# Patient Record
Sex: MALE | Race: WHITE | NOT HISPANIC OR LATINO | Employment: OTHER | URBAN - METROPOLITAN AREA
[De-identification: names, ages, dates, MRNs, and addresses within clinical notes are randomized per-mention and may not be internally consistent; named-entity substitution may affect disease eponyms.]

---

## 2020-01-24 DIAGNOSIS — M16.11 PRIMARY OSTEOARTHRITIS OF RIGHT HIP: Primary | ICD-10-CM

## 2020-01-27 ENCOUNTER — CLINICAL SUPPORT (OUTPATIENT)
Dept: REHABILITATION | Facility: HOSPITAL | Age: 75
End: 2020-01-27
Payer: MEDICARE

## 2020-01-27 DIAGNOSIS — Z96.641 STATUS POST TOTAL HIP REPLACEMENT, RIGHT: Primary | ICD-10-CM

## 2020-01-27 PROCEDURE — 97161 PT EVAL LOW COMPLEX 20 MIN: CPT | Mod: PN

## 2020-01-27 NOTE — PLAN OF CARE
[facility]  Physical Therapy Initial Evaluation    Name: Ana Cristina Velasco  Clinic Number: [id number]    Therapy Diagnosis:   Encounter Diagnosis   Name Primary?    Status post total hip replacement, right Yes     Physician: Luther Fischer MD    Physician Orders: PT Eval and Treat   Medical Diagnosis: Right hip OA - s/p Right VLADIMIR   Evaluation Date: 1/27/2020  Authorization period Expiration: 12/31/2020  Plan of Care Certification Period: 4/30/2020     Visit #: 1/ Visits authorized: 18  Time In:   8:00 AM   Time Out: 9:00 aM   Total Billable Time: 60 minutes    Precautions: Standard    Subjective   Date of onset: chronic   Date of Surgery: January 10, 2020    PmHx: Rod reports that he has no pertinent past medical history   Ana Cristina Velasco  has no past surgical history on file.    Ana Cristina currently has no medications in their medication list.    Review of patient's allergies indicates:  Allergies not on file       Prior Therapy: Rod had physical therapy for his hip about 1 year ago - went for PT for about 2-3 months for current condition  Social History: Patient lives in a 2-story home with 3 steps to enter ; Master bedroom is downstairs, no need to go upstairs at this time. He lives with his spouse, has walk-in shower - using [facility] frame as a shower chair; oRd has a RW, quad cane, single point cane   Occupation: retired   Prior Level of Function: Independent, golfs on a regular basis; stated that he does no regular exercise   Current Level of Function: currently ambulatory with use of RW, Independent with ADL's with exception of getting MIKE hose on - needs some assistance with this; He is not driving yet; continue to take two percocet's per day - one in the AM and another one toward bedtime.     Pain:  Current 2/10, worst 5/10, best 1/10   Location: hip  right  Description: Aching  Aggravating Factors: Standing, Morning and Getting out of bed/chair  Easing Factors: pain medication and  "rest      Onset/JOLYNN: gradual    History of current condition - ROD reports: well over a year history of symptoms, including progressive increase in right hip pain with walking and recreational activities; Tried therapy for several months, was helpful, but did not affect his pain enough for him to continue with his daily activity.  Rod underwent a posterior-lateral Right VLADIMIR 3 weeks ago.  His incision is currently steri-stripped; no sign of infection noted, incision is dry, healing, periwound tissue remains tender and edematous.  Rod is anxious to get back to golfing, wants to do whatever he needs to in order to achieve that goal. oRd was able to articulate 3/3 total hip precautions.     Pts goals: To return to my PLOF without pain        Objective     Observation: Rod ambulated into clinic with use of RW; brought in quad cane; Rod is alert/oriented x 4; He is well developed, well-nourished; demonstrates good muscle tone; good insights into current deficits as well as voices appropriate goals.     Posture: slight forward head posture; stands/walks with slight trunk flexion;     Hip Range of Motion:   Left active Left Passive Right active  Right Passive   Flexion 102 110 85 95   Abduction 25 25 15 15   Extension 15 15 10 10   Ext. Rotation 25 25 15 15   Int. Rotation 20 20 NT NT         Lower Extremity Strength  Right LE  Left LE    Knee extension: 4+/5 Knee extension: 5/5   Knee flexion: 4+/5 Knee flexion: 5/5   Hip flexion: 4/5 Hip flexion: 5/5   Hip Internal Rotation:  3-/5    Hip Internal Rotation: 5/5      Hip External Rotation: 4+/5    Hip External Rotation: 5/5      Hip extension:  4/5 Hip extension: 5/5   Hip abduction: 4/5 Hip abduction: 4/5   Hip adduction: 4-/5 Hip adduction 4+/5   Ankle dorsiflexion: 5/5 Ankle dorsiflexion: 5/5   Ankle plantarflexion: 4-/5 Ankle plantarflexion: 4-/5     SLS: Firm 10 sec,  Foam 5 sec  Toe Tap test: 5 reps on 6" step with unaffecting leg in 15 seconds  30 sec " mat rise: 7 reps with arms crossed    Palpation: minimal tenderness to palpation at right lateral hip around incision     Sensation: intact to light touch t/o RLE     Flexibility:     90/90 SLR = R minimal restriction, L minimal restriction               Raji test: R moderate restriction, L minimal restriction    PT Evaluation Completed? Yes  Discussed Plan of Care with patient: Yes    TREATMENT     BIRD participated in neuromuscular re-education activities to improve: Posture for 10 minutes. The following activities were included:  Standing right hip flexor stretch in doorway - instructed in this to facilitate improved elongation of right hip flexor   Standing - neck retraction/scapuular retractions     Home Exercises and Patient Education Provided    Education provided re:   - progress towards goals   - role of therapy in multi - disciplinary team, goals for therapy  Pt educated on condition, POC, and expectations in therapy.  No spiritual or educational barriers to learning provided    Home exercises:  Pt will be provided HEP during course of treatment with progressions as appropriate. Pt was advised to perform these exercises free of pain, and to stop performing them if pain occurs.   BIRD demonstrated good  understanding of the education provided.       Functional Limitations Reports - G Codes  Category: Mobility, Body position, Carrying, Self care, Other  Tool: LEFS  Score: 51% limitation     Assessment   Ana Cristina is a 74 y.o. male referred to outpatient physical therapy and presents to PT with s/p Right VLADIMIR . Patient demonstrates limitations as described in the problem list. Pt will benefit from physcial therapy services in order to maximize pain free and/or functional use of bilateral LE's . The following goals were discussed with the patient and patient is in agreement with them as to be addressed in the treatment plan.   Pt prognosis is Excellent.   Pt will benefit from skilled outpatient Physical Therapy  to address the deficits stated above and in the chart below, provide pt/family education, and to maximize pt's level of independence.     Plan of care discussed with patient: Yes  Pt's spiritual, cultural and educational needs considered and pt agreeable to plan of care and goals as stated below:     Anticipated Barriers for therapy: none    Medical necessity is demonstrated by the following IMPAIRMENTS/PROBLEM LIST:    weakness, impaired endurance, impaired functional mobility, gait instability, impaired balance, decreased lower extremity function, pain, decreased ROM and orthopedic precautions    GOALS:     Long Term Goals: 6 weeks  Pain: Decrease pain to 0/10 to allow for improved ability to perform daily and recreational activities   Strength: Improve strength in bilateral Hip to 5/5 for improved LE stability  ROM: Improve ROM to functional limits in right hip - within any remaining hip precautions    Functional scale: Improve score on LEFS to <30% limitations   Lifting: Lift 20 lbs to waist level, and carry for 25 feet without pain or compensation  Walking: Increase walking distance/duration to 1/4 mile without pain  Postures: Increase sitting and/or standing duration to 30 mins without pain   Transfers: Perform sit <> Stand transfers without increased pain or limitation  Exercise: demonstrate independence with home exercise program to maintain gains made in therapy.          Plan   Certification Period: 1/27/2020 to 04/30/2020.    Outpatient physical therapy 2- 3 times weekly to include: Gait Training, Moist Heat/ Ice, Neuromuscular Re-ed, Patient Education and Therapeutic Exercise. Cont PT for 6 weeks.   Pt may be seen by PTA as part of the rehabilitation team.     I certify the need for these services furnished under this plan of treatment and while under my care.    Rose Ballesteros, PT          Attestation:   I have seen the patient, reviewed the therapist's plan of care, and I agree with the plan of care.   I  certify the need for these services furnished under this plan of treatment and while under my care.         _______________            ________                                               _____________________  Physician/Referring Practitioner                                                            Date of Signature

## 2020-01-29 ENCOUNTER — CLINICAL SUPPORT (OUTPATIENT)
Dept: REHABILITATION | Facility: HOSPITAL | Age: 75
End: 2020-01-29
Payer: MEDICARE

## 2020-01-29 DIAGNOSIS — R29.898 WEAKNESS OF RIGHT LOWER EXTREMITY: ICD-10-CM

## 2020-01-29 DIAGNOSIS — M25.551 CHRONIC HIP PAIN AFTER TOTAL REPLACEMENT OF RIGHT HIP JOINT: Primary | ICD-10-CM

## 2020-01-29 DIAGNOSIS — G89.29 CHRONIC HIP PAIN AFTER TOTAL REPLACEMENT OF RIGHT HIP JOINT: Primary | ICD-10-CM

## 2020-01-29 DIAGNOSIS — Z96.641 CHRONIC HIP PAIN AFTER TOTAL REPLACEMENT OF RIGHT HIP JOINT: Primary | ICD-10-CM

## 2020-01-29 PROCEDURE — 97110 THERAPEUTIC EXERCISES: CPT | Mod: PN

## 2020-01-29 NOTE — PROGRESS NOTES
Physical Therapy Daily Note     Name: Rod De La Paz  Clinic Number: [id number]  Diagnosis:   Encounter Diagnoses   Name Primary?    Weakness of right lower extremity     Chronic hip pain after total replacement of right hip joint Yes     Physician: Luther Fischer MD  Precautions: total hip precautions   Visit #: 2 of 18  PTA Visit #: 0  Time In: 10:00 AM   Time Out: 11:00 AM     Subjective     Pt reports: My hip was a little sore this morning, I think it might be the weather.   Pain Scale: Rod rates pain on a scale of 0-10 to be 3 currently.    Objective     Rod received individual therapeutic exercises to develop strength, ROM, posture and core stabilization for 50  minutes including:  Nu-Step - Level 6 x 15 mins   Standing: Toe-Taps on bench x 3 mins   Standing:  Static stand on Air-Ex mat x 2 mins with arms crossed at chest  Standing: Bilateral hip extension x 10 with light blue band   Side-Step - blue band x  2 1/2 mins   Heel raises x 20  Supine: Heel slides x 10  Supine: SLR x 10  Supine: hip abduction x 15  Supine: bridging x 15  Supine - ball squeezes x 3 mins   S/L: clams x 12     Rod received the following manual therapy techniques: Soft tissue Mobilization were applied to the: right hip for 10 minutes including:  In side lying with 2 pillows between knees - soft tissue mobilization around lateral hip incision and surrounding tissue to address tissue induration that remains; Encouraged Rod to elevate his leg in a reclined position to assist with swelling management - ankle pumps along with heel to toe movement with gait to activate foot/calf pump.      The patient received the following direct contact modalities after being cleared for contraindications:     The patient received the following supervised modalities after being cleared for contradictions:     Written Home Exercises Provided:   Continue with HEP given to him by HHPT; add in hip  flexor stretch and edema management techniques noted above.   Pt demo good understanding of the education provided. Rod demonstrated good return demonstration of activities.     Education provided re:  Rod verbalized good understanding of education provided.   No spiritual or educational barriers to learning provided    Assessment     Patient tolerated treatment well; general decline in fitness level noted; decreased ability to stabilize on his  RLE - hip weakness, but overall doing well and is where he should be regarding progress.   This is a 74 y.o. male referred to outpatient physical therapy and presents with a medical diagnosis of Right VLADIMIR  and demonstrates limitations as described in the problem list. Pt prognosis is Good. Pt will continue to benefit from skilled outpatient physical therapy to address the deficits listed in the problem list, provide pt/family education and to maximize pt's level of independence in the home and community environment.     Goals as follows:    Long Term Goals: 6 weeks  Pain: Decrease pain to 0/10 to allow for improved ability to perform daily and recreational activities   Strength: Improve strength in bilateral Hip to 5/5 for improved LE stability  ROM: Improve ROM to functional limits in right hip - within any remaining hip precautions    Functional scale: Improve score on LEFS to <30% limitations   Lifting: Lift 20 lbs to waist level, and carry for 25 feet without pain or compensation  Walking: Increase walking distance/duration to 1/4 mile without pain  Postures: Increase sitting and/or standing duration to 30 mins without pain   Transfers: Perform sit <> Stand transfers without increased pain or limitation  Exercise: demonstrate independence with home exercise program to maintain gains made in therapy.       Plan     Continue with established Plan of Care towards PT goals.    Therapist: Rose Ballesteros, PT  1/29/2020

## 2020-01-31 ENCOUNTER — CLINICAL SUPPORT (OUTPATIENT)
Dept: REHABILITATION | Facility: HOSPITAL | Age: 75
End: 2020-01-31
Payer: MEDICARE

## 2020-01-31 DIAGNOSIS — G89.29 CHRONIC HIP PAIN AFTER TOTAL REPLACEMENT OF RIGHT HIP JOINT: Primary | ICD-10-CM

## 2020-01-31 DIAGNOSIS — Z96.641 CHRONIC HIP PAIN AFTER TOTAL REPLACEMENT OF RIGHT HIP JOINT: Primary | ICD-10-CM

## 2020-01-31 DIAGNOSIS — M25.551 CHRONIC HIP PAIN AFTER TOTAL REPLACEMENT OF RIGHT HIP JOINT: Primary | ICD-10-CM

## 2020-01-31 DIAGNOSIS — R29.898 WEAKNESS OF RIGHT LOWER EXTREMITY: ICD-10-CM

## 2020-01-31 PROCEDURE — 97110 THERAPEUTIC EXERCISES: CPT | Mod: PN

## 2020-01-31 NOTE — PROGRESS NOTES
Physical Therapy Daily Note     Name: Rod De La Paz  Clinic Number: [id number]  Diagnosis:   Encounter Diagnoses   Name Primary?    Chronic hip pain after total replacement of right hip joint Yes    Weakness of right lower extremity      Physician: Luther Fischer MD  Precautions: total hip precautions   Visit #: 3 of 18  PTA Visit #: 0  Time In: 10:00 AM   Time Out: 11:00 AM     Subjective     Pt reports:    Rod reports nothing new; hip feels good.   Pain Scale: Rod rates pain on a scale of 0-10 to be 2-3 currently.    Objective     Rod received individual therapeutic exercises to develop strength, ROM, posture and core stabilization for 50  minutes including:  Nu-Step - Level 6 x 15 mins   Standing: Toe-Taps on Air-Ex to  bench x 3 mins   Standing:  Static stand on Air-Ex mat x 2 mins with arms crossed at chest  Standing: Bilateral hip extension x 10 with light blue band   Side-Step - blue band x  2 1/2 mins   Heel raises x 20  Supine: Heel slides x 10  Supine: SLR x 10  Supine: hip abduction x 15  Supine: bridging x 15  Supine - ball squeezes x 3 mins   S/L: clams x 12     Rod received the following manual therapy techniques: Soft tissue Mobilization were applied to the: right hip for 10 minutes including:  In side lying with 2 pillows between knees - soft tissue mobilization around lateral hip incision and surrounding tissue to address tissue induration that remains; Encouraged Rod to elevate his leg in a reclined position to assist with swelling management - ankle pumps along with heel to toe movement with gait to activate foot/calf pump.      The patient received the following direct contact modalities after being cleared for contraindications:     The patient received the following supervised modalities after being cleared for contradictions:     Written Home Exercises Provided:   Continue with HEP given to him by HHPT; add in hip flexor stretch and  "edema management techniques noted above.   Pt demo good understanding of the education provided. Rod demonstrated good return demonstration of activities.     Education provided re:  Rod verbalized good understanding of education provided.   No spiritual or educational barriers to learning provided    Assessment     Patient tolerated treatment well; less edema noted in RLE; ankle and knee are much more "visible'  general decline in fitness level noted; decreased ability to stabilize on his  RLE - hip weakness, but overall doing well and is where he should be regarding progress.   This is a 75 y.o. male referred to outpatient physical therapy and presents with a medical diagnosis of Right VLADIMIR  and demonstrates limitations as described in the problem list. Pt prognosis is Good. Pt will continue to benefit from skilled outpatient physical therapy to address the deficits listed in the problem list, provide pt/family education and to maximize pt's level of independence in the home and community environment.     Goals as follows:    Long Term Goals: 6 weeks  Pain: Decrease pain to 0/10 to allow for improved ability to perform daily and recreational activities   Strength: Improve strength in bilateral Hip to 5/5 for improved LE stability  ROM: Improve ROM to functional limits in right hip - within any remaining hip precautions    Functional scale: Improve score on LEFS to <30% limitations   Lifting: Lift 20 lbs to waist level, and carry for 25 feet without pain or compensation  Walking: Increase walking distance/duration to 1/4 mile without pain  Postures: Increase sitting and/or standing duration to 30 mins without pain   Transfers: Perform sit <> Stand transfers without increased pain or limitation  Exercise: demonstrate independence with home exercise program to maintain gains made in therapy.       Plan     Continue with established Plan of Care towards PT goals.    Therapist: Rose Ballesteros, PT  1/31/2020  "

## 2020-02-03 ENCOUNTER — CLINICAL SUPPORT (OUTPATIENT)
Dept: REHABILITATION | Facility: HOSPITAL | Age: 75
End: 2020-02-03
Payer: MEDICARE

## 2020-02-03 DIAGNOSIS — Z96.641 CHRONIC HIP PAIN AFTER TOTAL REPLACEMENT OF RIGHT HIP JOINT: ICD-10-CM

## 2020-02-03 DIAGNOSIS — G89.29 CHRONIC HIP PAIN AFTER TOTAL REPLACEMENT OF RIGHT HIP JOINT: ICD-10-CM

## 2020-02-03 DIAGNOSIS — R29.898 WEAKNESS OF RIGHT LOWER EXTREMITY: Primary | ICD-10-CM

## 2020-02-03 DIAGNOSIS — M25.551 CHRONIC HIP PAIN AFTER TOTAL REPLACEMENT OF RIGHT HIP JOINT: ICD-10-CM

## 2020-02-03 PROCEDURE — 97110 THERAPEUTIC EXERCISES: CPT | Mod: PN

## 2020-02-03 NOTE — PROGRESS NOTES
Physical Therapy Daily Note     Name: Rdo De La Paz  Clinic Number: [id number]  Diagnosis:   Encounter Diagnoses   Name Primary?    Weakness of right lower extremity Yes    Chronic hip pain after total replacement of right hip joint      Physician: Luther Fischer MD  Precautions: total hip precautions   Visit #: 4 of 18  PTA Visit #: 0  Time In: 10:00 AM   Time Out: 11:10 AM     Subjective     Pt reports:    Rod reports that he hasn't been feelling all that well the past few days; doesn't know if he is coming down with something; No increase in hip pain, just some gastro-intestinal issues.    Pain Scale: Rod rates pain on a scale of 0-10 to be 2-3 currently.    Objective     Rod received individual therapeutic exercises to develop strength, ROM, posture and core stabilization for 50  minutes including:  Nu-Step - Level 6 x 20 mins   Standing: Toe-Taps on Air-Ex to  bench x 3 mins   Standing:  Static stand on Air-Ex mat x 2 mins with arms crossed at chest  Standing: Bilateral hip extension, abduction and flexion with green Tband x 15 each   Side-Step - blue band x  2 1/2 mins   Heel raises x 20  Supine: Heel slides x 10  Supine: SLR x 10  Supine: bridging x 15  Supine - ball squeezes x 3 mins   S/L: clams x 12   S/L Hip abduction x 10   Prone - knee flexion x 10  Prone hip extension x 10     Rod received the following manual therapy techniques: Soft tissue Mobilization were applied to the: right hip for 10 minutes including:  In side lying with 2 pillows between knees - soft tissue mobilization around lateral hip incision and surrounding tissue to address tissue induration that remains; Encouraged Rod to elevate his leg in a reclined position to assist with swelling management - ankle pumps along with heel to toe movement with gait to activate foot/calf pump.      The patient received the following direct contact modalities after being cleared for  "contraindications:     The patient received the following supervised modalities after being cleared for contradictions:     Written Home Exercises Provided:   Continue with HEP given to him by HHPT; add in hip flexor stretch and edema management techniques noted above.   Pt demo good understanding of the education provided. Rod demonstrated good return demonstration of activities.     Education provided re:  Rod verbalized good understanding of education provided.   No spiritual or educational barriers to learning provided    Assessment     Patient tolerated treatment well; less edema noted in RLE; ankle and knee are much more "visible'. Rod is progressing well; each visit he notes improvement in the swelling in his RLE as well as his ability to ambulate and move his right leg.   This is a 75 y.o. male referred to outpatient physical therapy and presents with a medical diagnosis of Right VLADIMIR  and demonstrates limitations as described in the problem list. Pt prognosis is Good. Pt will continue to benefit from skilled outpatient physical therapy to address the deficits listed in the problem list, provide pt/family education and to maximize pt's level of independence in the home and community environment.     Goals as follows:    Long Term Goals: 6 weeks  Pain: Decrease pain to 0/10 to allow for improved ability to perform daily and recreational activities   Strength: Improve strength in bilateral Hip to 5/5 for improved LE stability  ROM: Improve ROM to functional limits in right hip - within any remaining hip precautions    Functional scale: Improve score on LEFS to <30% limitations   Lifting: Lift 20 lbs to waist level, and carry for 25 feet without pain or compensation  Walking: Increase walking distance/duration to 1/4 mile without pain  Postures: Increase sitting and/or standing duration to 30 mins without pain   Transfers: Perform sit <> Stand transfers without increased pain or limitation  Exercise: " demonstrate independence with home exercise program to maintain gains made in therapy.       Plan     Continue with established Plan of Care towards PT goals.    Therapist: Rose Ballesteros, PT  2/3/2020

## 2020-02-05 ENCOUNTER — CLINICAL SUPPORT (OUTPATIENT)
Dept: REHABILITATION | Facility: HOSPITAL | Age: 75
End: 2020-02-05
Payer: MEDICARE

## 2020-02-05 DIAGNOSIS — R29.898 WEAKNESS OF RIGHT LOWER EXTREMITY: ICD-10-CM

## 2020-02-05 DIAGNOSIS — G89.29 CHRONIC HIP PAIN AFTER TOTAL REPLACEMENT OF RIGHT HIP JOINT: Primary | ICD-10-CM

## 2020-02-05 DIAGNOSIS — Z96.641 CHRONIC HIP PAIN AFTER TOTAL REPLACEMENT OF RIGHT HIP JOINT: Primary | ICD-10-CM

## 2020-02-05 DIAGNOSIS — M25.551 CHRONIC HIP PAIN AFTER TOTAL REPLACEMENT OF RIGHT HIP JOINT: Primary | ICD-10-CM

## 2020-02-05 PROCEDURE — 97140 MANUAL THERAPY 1/> REGIONS: CPT | Mod: PN

## 2020-02-05 PROCEDURE — 97110 THERAPEUTIC EXERCISES: CPT | Mod: PN

## 2020-02-05 NOTE — PROGRESS NOTES
Physical Therapy Daily Note     Name: Rod De La Paz  Clinic Number: [id number]  Diagnosis:   Encounter Diagnoses   Name Primary?    Chronic hip pain after total replacement of right hip joint Yes    Weakness of right lower extremity      Physician: Luther Fischer MD  Precautions: total hip precautions   Visit #: 5 of 18  PTA Visit #: 0  Time In: 10:00 AM   Time Out: 11:15 AM     Subjective     Pt reports:    Rod stated that he was feeling well; walking with quad cane - not putting much weight through it; walking with good pace.   Pain Scale: Rod rates pain on a scale of 0-10 to be 2-3 currently.    Objective     Rod received individual therapeutic exercises to develop strength, ROM, posture and core stabilization for 50  minutes including:  Nu-Step - Level 6 x 20 mins   Standing: Toe-Taps on Air-Ex to  Silver step x 3 mins   Standing:  Static stand on Air-Ex mat x 2 mins with arms crossed at chest  Standing: Bilateral hip extension, abduction and flexion with green Tband x 15 each   Side-Step - blue band x  2 1/2 mins   Heel raises x 20  Supine: Heel slides x 10  Supine: SLR x 20  Supine: bridging x 20  Supine - ball squeezes x 3 mins   S/L: clams x 15   S/L Hip abduction x 15   Prone - knee flexion x 10  Prone hip extension x 10     Rod received the following manual therapy techniques: Soft tissue Mobilization were applied to the: right hip for 10 minutes including:  In side lying with 2 pillows between knees - soft tissue mobilization around lateral hip incision and surrounding tissue to address tissue induration that remains; Encouraged Rod to elevate his leg in a reclined position to assist with swelling management - ankle pumps along with heel to toe movement with gait to activate foot/calf pump.      The patient received the following direct contact modalities after being cleared for contraindications:     The patient received the following  supervised modalities after being cleared for contradictions:     Written Home Exercises Provided:   Continue with HEP given to him by HHPT; add in hip flexor stretch and edema management techniques noted above.   Pt demo good understanding of the education provided. Rod demonstrated good return demonstration of activities.     Education provided re:  Rod verbalized good understanding of education provided.   No spiritual or educational barriers to learning provided    Assessment     Patient tolerated treatment well; he is progressing well with right LE movement and strength; Incision looks good; . Rod is progressing well; each visit he notes improvement in the swelling in his RLE as well as his ability to ambulate and move his right leg.   This is a 75 y.o. male referred to outpatient physical therapy and presents with a medical diagnosis of Right VLADIMIR  and demonstrates limitations as described in the problem list. Pt prognosis is Good. Pt will continue to benefit from skilled outpatient physical therapy to address the deficits listed in the problem list, provide pt/family education and to maximize pt's level of independence in the home and community environment.     Goals as follows:    Long Term Goals: 6 weeks  Pain: Decrease pain to 0/10 to allow for improved ability to perform daily and recreational activities   Strength: Improve strength in bilateral Hip to 5/5 for improved LE stability  ROM: Improve ROM to functional limits in right hip - within any remaining hip precautions    Functional scale: Improve score on LEFS to <30% limitations   Lifting: Lift 20 lbs to waist level, and carry for 25 feet without pain or compensation  Walking: Increase walking distance/duration to 1/4 mile without pain  Postures: Increase sitting and/or standing duration to 30 mins without pain   Transfers: Perform sit <> Stand transfers without increased pain or limitation  Exercise: demonstrate independence with home exercise  program to maintain gains made in therapy.       Plan     Continue with established Plan of Care towards PT goals.    Therapist: Rose Ballesteros, PT  2/5/2020

## 2020-02-07 ENCOUNTER — CLINICAL SUPPORT (OUTPATIENT)
Dept: REHABILITATION | Facility: HOSPITAL | Age: 75
End: 2020-02-07
Payer: MEDICARE

## 2020-02-07 DIAGNOSIS — M25.551 CHRONIC HIP PAIN AFTER TOTAL REPLACEMENT OF RIGHT HIP JOINT: Primary | ICD-10-CM

## 2020-02-07 DIAGNOSIS — Z96.641 CHRONIC HIP PAIN AFTER TOTAL REPLACEMENT OF RIGHT HIP JOINT: Primary | ICD-10-CM

## 2020-02-07 DIAGNOSIS — G89.29 CHRONIC HIP PAIN AFTER TOTAL REPLACEMENT OF RIGHT HIP JOINT: Primary | ICD-10-CM

## 2020-02-07 DIAGNOSIS — R29.898 WEAKNESS OF RIGHT LOWER EXTREMITY: ICD-10-CM

## 2020-02-07 PROCEDURE — 97110 THERAPEUTIC EXERCISES: CPT | Mod: PN

## 2020-02-07 NOTE — PROGRESS NOTES
Physical Therapy Daily Note     Name: Rod De La Paz  Clinic Number: [id number]  Diagnosis:   Encounter Diagnoses   Name Primary?    Chronic hip pain after total replacement of right hip joint Yes    Weakness of right lower extremity      Physician: Luther Fischer MD  Precautions: total hip precautions   Visit #: 6 of 18  PTA Visit #: 0  Time In: 10:00 AM   Time Out: 11:10 AM     Subjective     Pt reports:    Rod stated that he is continuing to feel better, ambulated into clinic with quad cane - but not really using it; discussed progressing to ambulation without it.   Pain Scale: Rod rates pain on a scale of 0-10 to be 2-3 currently.    Objective     Rod received individual therapeutic exercises to develop strength, ROM, posture and core stabilization for 50  minutes including:  Nu-Step - Level 6 x 20 mins   Standing: Toe-Taps on Air-Ex to  Silver step x 3 mins   Standing:  Static stand on Air-Ex mat x 2 mins with arms crossed at chest  Standing: Bilateral hip extension, abduction and flexion with green Tband x 15 each   Side-Step - blue band x  2 1/2 mins   Heel raises x 20  Supine: Heel slides x 10  Supine: SLR x 20  Supine: bridging x 20  Supine - ball squeezes x 3 mins   S/L: clams x 15   S/L Hip abduction x 15   Prone - knee flexion x 10  Prone hip extension x 10     Rod received the following manual therapy techniques: Soft tissue Mobilization were applied to the: right hip for 10 minutes including:  In side lying with 2 pillows between knees - soft tissue mobilization around lateral hip incision and surrounding tissue to address tissue induration that remains; Encouraged Rod to elevate his leg in a reclined position to assist with swelling management - ankle pumps along with heel to toe movement with gait to activate foot/calf pump.      The patient received the following direct contact modalities after being cleared for contraindications:     The  patient received the following supervised modalities after being cleared for contradictions:     Written Home Exercises Provided:   Continue with HEP given to him by HHPT; add in hip flexor stretch and edema management techniques noted above.   Pt demo good understanding of the education provided. Rod demonstrated good return demonstration of activities.     Education provided re:  Rod verbalized good understanding of education provided.   No spiritual or educational barriers to learning provided    Assessment     Patient tolerated treatment well; he is progressing well with right LE movement and strength; Incision looks good; . Rod is progressing well; each visit he notes improvement in the swelling in his RLE as well as his ability to ambulate and move his right leg.   This is a 75 y.o. male referred to outpatient physical therapy and presents with a medical diagnosis of Right VLADIMIR  and demonstrates limitations as described in the problem list. Pt prognosis is Good. Pt will continue to benefit from skilled outpatient physical therapy to address the deficits listed in the problem list, provide pt/family education and to maximize pt's level of independence in the home and community environment.     Goals as follows:    Long Term Goals: 6 weeks  Pain: Decrease pain to 0/10 to allow for improved ability to perform daily and recreational activities   Strength: Improve strength in bilateral Hip to 5/5 for improved LE stability  ROM: Improve ROM to functional limits in right hip - within any remaining hip precautions    Functional scale: Improve score on LEFS to <30% limitations   Lifting: Lift 20 lbs to waist level, and carry for 25 feet without pain or compensation  Walking: Increase walking distance/duration to 1/4 mile without pain  Postures: Increase sitting and/or standing duration to 30 mins without pain   Transfers: Perform sit <> Stand transfers without increased pain or limitation  Exercise: demonstrate  independence with home exercise program to maintain gains made in therapy.       Plan     Continue with established Plan of Care towards PT goals.    Therapist: Rose Ballesteros, PT  2/7/2020

## 2020-02-10 ENCOUNTER — CLINICAL SUPPORT (OUTPATIENT)
Dept: REHABILITATION | Facility: HOSPITAL | Age: 75
End: 2020-02-10
Payer: MEDICARE

## 2020-02-10 DIAGNOSIS — R29.898 WEAKNESS OF RIGHT LOWER EXTREMITY: ICD-10-CM

## 2020-02-10 DIAGNOSIS — Z96.641 CHRONIC HIP PAIN AFTER TOTAL REPLACEMENT OF RIGHT HIP JOINT: Primary | ICD-10-CM

## 2020-02-10 DIAGNOSIS — M25.551 CHRONIC HIP PAIN AFTER TOTAL REPLACEMENT OF RIGHT HIP JOINT: Primary | ICD-10-CM

## 2020-02-10 DIAGNOSIS — G89.29 CHRONIC HIP PAIN AFTER TOTAL REPLACEMENT OF RIGHT HIP JOINT: Primary | ICD-10-CM

## 2020-02-10 PROCEDURE — 97110 THERAPEUTIC EXERCISES: CPT | Mod: PN

## 2020-02-10 NOTE — PROGRESS NOTES
Physical Therapy Daily Note     Name: Rod De La Paz  Clinic Number: [id number]  Diagnosis:   Encounter Diagnoses   Name Primary?    Chronic hip pain after total replacement of right hip joint Yes    Weakness of right lower extremity      Physician: Luther Fischer MD  Precautions: total hip precautions   Visit #: 7 of 18  PTA Visit #: 0  Time In: 10:00 AM   Time Out: 11:10 AM     Subjective     Pt reports:    Rod stated that his hip was a little sore yesterday - was up on it all day Saturday; other than that, all is good.   Pain Scale: Rod rates pain on a scale of 0-10 to be 2-3 currently.    Objective     Rod received individual therapeutic exercises to develop strength, ROM, posture and core stabilization for 50  minutes including:  Nu-Step - Level 6 x 20 mins   Standing: Toe-Taps on Air-Ex to  Silver step x 3 mins   Step-ups onto green/purple - 3 mins - switch lead leg 1/2 way  Marching - knees up - flex to 90 x 1 min  Standing: Bilateral hip extension, abduction and flexion with green Tband x 15 each   Side-Step - blue band x  2 1/2 mins   Heel raises x 20  Supine: Heel slides x 10  Supine: SLR x 20  Supine: bridging x 20  Supine - ball squeezes x 3 mins   S/L: clams x 15   S/L Hip abduction x 15   Prone - knee flexion x 10  Prone hip extension x 10     Rod received the following manual therapy techniques: Soft tissue Mobilization were applied to the: right hip for 10 minutes including:  In side lying with 2 pillows between knees - soft tissue mobilization around lateral hip incision and surrounding tissue to address tissue induration that remains; Encouraged Rod to elevate his leg in a reclined position to assist with swelling management - ankle pumps along with heel to toe movement with gait to activate foot/calf pump.      The patient received the following direct contact modalities after being cleared for contraindications:     The patient received  the following supervised modalities after being cleared for contradictions:     Written Home Exercises Provided:   Continue with HEP given to him by HHPT; add in hip flexor stretch and edema management techniques noted above.   Pt demo good understanding of the education provided. Rod demonstrated good return demonstration of activities.     Education provided re:  Rod verbalized good understanding of education provided.   No spiritual or educational barriers to learning provided    Assessment     Patient tolerated treatment well; Rod is making excellent progress. He is ambulating without use of his quad cane now.  Moving his RLE without difficulty - strength is good.   This is a 75 y.o. male referred to outpatient physical therapy and presents with a medical diagnosis of Right VLADIMIR  and demonstrates limitations as described in the problem list. Pt prognosis is Good. Pt will continue to benefit from skilled outpatient physical therapy to address the deficits listed in the problem list, provide pt/family education and to maximize pt's level of independence in the home and community environment.     Goals as follows:    Long Term Goals: 6 weeks  Pain: Decrease pain to 0/10 to allow for improved ability to perform daily and recreational activities   Strength: Improve strength in bilateral Hip to 5/5 for improved LE stability  ROM: Improve ROM to functional limits in right hip - within any remaining hip precautions    Functional scale: Improve score on LEFS to <30% limitations   Lifting: Lift 20 lbs to waist level, and carry for 25 feet without pain or compensation  Walking: Increase walking distance/duration to 1/4 mile without pain  Postures: Increase sitting and/or standing duration to 30 mins without pain   Transfers: Perform sit <> Stand transfers without increased pain or limitation  Exercise: demonstrate independence with home exercise program to maintain gains made in therapy.       Plan     Continue with  established Plan of Care towards PT goals.    Therapist: Rose Ballesteros, PT  2/10/2020

## 2020-02-12 ENCOUNTER — CLINICAL SUPPORT (OUTPATIENT)
Dept: REHABILITATION | Facility: HOSPITAL | Age: 75
End: 2020-02-12
Payer: MEDICARE

## 2020-02-12 DIAGNOSIS — M25.551 CHRONIC HIP PAIN AFTER TOTAL REPLACEMENT OF RIGHT HIP JOINT: Primary | ICD-10-CM

## 2020-02-12 DIAGNOSIS — G89.29 CHRONIC HIP PAIN AFTER TOTAL REPLACEMENT OF RIGHT HIP JOINT: Primary | ICD-10-CM

## 2020-02-12 DIAGNOSIS — Z96.641 CHRONIC HIP PAIN AFTER TOTAL REPLACEMENT OF RIGHT HIP JOINT: Primary | ICD-10-CM

## 2020-02-12 PROCEDURE — 97110 THERAPEUTIC EXERCISES: CPT | Mod: PN

## 2020-02-12 NOTE — PROGRESS NOTES
Physical Therapy Daily Note     Name: Rod De La Paz  Clinic Number: [id number]  Diagnosis:   Encounter Diagnosis   Name Primary?    Chronic hip pain after total replacement of right hip joint Yes     Physician: Luther Fischer MD  Precautions: total hip precautions   Visit #: 8 of 18  PTA Visit #: 0  Time In: 10:00 AM   Time Out: 11:10 AM     Subjective     Pt reports:    Rod continues to do well; no c/o increased pain in his hip.   Pain Scale: Rod rates pain on a scale of 0-10 to be 2-3 currently.    Objective     Rod received individual therapeutic exercises to develop strength, ROM, posture and core stabilization for 50  minutes including:  Nu-Step - Level 6 x 20 mins  - Recumbent Bike today - Level 6 x 20 mins   Standing: Toe-Taps on Air-Ex to  Silver step x 3 mins   Step-ups onto green/purple - 3 mins - switch lead leg 1/2 way  Marching - knees up - flex to 90 x 1 min - added light green band resistance   Standing: Bilateral hip extension, abduction and flexion with green Tband x 15 each   Side-Step - blue band x  2 1/2 mins   Heel raises x 20  Supine: hip flexion - knee lifts to 90 degrees x 10   Supine: SLR x 20  Supine: bridging x 20  Supine - ball squeezes x 3 mins   Supine - pelvic tilts x 10   S/L: clams x 15   S/L Hip abduction x 15   Prone - knee flexion x 10  Prone hip extension x 10     Rod received the following manual therapy techniques: Soft tissue Mobilization were applied to the: right hip for 10 minutes including: Did not perform   In side lying with 2 pillows between knees - soft tissue mobilization around lateral hip incision and surrounding tissue to address tissue induration that remains; Encouraged Rod to elevate his leg in a reclined position to assist with swelling management - ankle pumps along with heel to toe movement with gait to activate foot/calf pump.      The patient received the following direct contact modalities after  being cleared for contraindications:     The patient received the following supervised modalities after being cleared for contradictions:     Written Home Exercises Provided:   Continue with HEP given to him by HHPT; add in hip flexor stretch and edema management techniques noted above.   Pt demo good understanding of the education provided. Rod demonstrated good return demonstration of activities.     Education provided re:  Rod verbalized good understanding of education provided.   No spiritual or educational barriers to learning provided    Assessment     Patient tolerated treatment well; Rod is making excellent progress. He is ambulating without use of his quad cane now.  Moving his RLE without difficulty - strength is good. Started Recumbent bike today; Will progress to more core exercises next visit.   This is a 75 y.o. male referred to outpatient physical therapy and presents with a medical diagnosis of Right VLADIMIR  and demonstrates limitations as described in the problem list. Pt prognosis is Good. Pt will continue to benefit from skilled outpatient physical therapy to address the deficits listed in the problem list, provide pt/family education and to maximize pt's level of independence in the home and community environment.     Goals as follows:    Long Term Goals: 6 weeks  Pain: Decrease pain to 0/10 to allow for improved ability to perform daily and recreational activities   Strength: Improve strength in bilateral Hip to 5/5 for improved LE stability  ROM: Improve ROM to functional limits in right hip - within any remaining hip precautions    Functional scale: Improve score on LEFS to <30% limitations   Lifting: Lift 20 lbs to waist level, and carry for 25 feet without pain or compensation  Walking: Increase walking distance/duration to 1/4 mile without pain  Postures: Increase sitting and/or standing duration to 30 mins without pain   Transfers: Perform sit <> Stand transfers without increased pain  or limitation  Exercise: demonstrate independence with home exercise program to maintain gains made in therapy.       Plan     Continue with established Plan of Care towards PT goals.    Therapist: Rose Ballesteros, PT  2/12/2020

## 2020-02-14 ENCOUNTER — CLINICAL SUPPORT (OUTPATIENT)
Dept: REHABILITATION | Facility: HOSPITAL | Age: 75
End: 2020-02-14
Payer: MEDICARE

## 2020-02-14 DIAGNOSIS — G89.29 CHRONIC HIP PAIN AFTER TOTAL REPLACEMENT OF RIGHT HIP JOINT: Primary | ICD-10-CM

## 2020-02-14 DIAGNOSIS — Z96.641 CHRONIC HIP PAIN AFTER TOTAL REPLACEMENT OF RIGHT HIP JOINT: Primary | ICD-10-CM

## 2020-02-14 DIAGNOSIS — M25.551 CHRONIC HIP PAIN AFTER TOTAL REPLACEMENT OF RIGHT HIP JOINT: Primary | ICD-10-CM

## 2020-02-14 PROCEDURE — 97110 THERAPEUTIC EXERCISES: CPT | Mod: PN

## 2020-02-14 NOTE — PROGRESS NOTES
Physical Therapy Daily Note     Name: Rod De La Paz  Clinic Number: [id number]  Diagnosis:   No diagnosis found.  Physician: Luther Fischer MD  Precautions: total hip precautions   Visit #: 9 of 18  PTA Visit #: 0  Time In: 10:00 AM   Time Out: 11:10 AM     Subjective     Pt reports:    Rod continues to do well; no c/o increased pain in his hip; ambulating without use of AD, walking more at home.   Pain Scale: Rod rates pain on a scale of 0-10 to be 2-3 currently.    Objective     Rod received individual therapeutic exercises to develop strength, ROM, posture and core stabilization for 50  minutes including:  Nu-Step - Level 6 x 20 mins  - Recumbent Bike today - Level 6 x 20 mins   Standing: Toe-Taps on Air-Ex to  Silver step x 3 mins   Step-ups onto green/purple - 3 mins - switch lead leg 1/2 way  Marching - knees up - flex to 90 x 1 min - added light green band resistance   Standing: Bilateral hip extension, abduction and flexion with green Tband x 15 each   Side-Step - blue band x  2 1/2 mins  Heel raises x 20  Supine: hip flexion - knee lifts to 90 degrees x 10   Supine: SLR x 20  Supine: bridging x 20  Supine - ball squeezes x 3 mins - with pelvic tilts   S/L: clams x 15   S/L Hip abduction x 15   Prone - knee flexion x 20  Prone hip extension x 20     Added:   Cable Cross:   Paloff Presses - 7# x 10 reps each side  Scapular Retraction - 10# x 15 reps   High > Low 10# x 10  Low > High 10# x 10   Wall Squats with SB x 15     Rod received the following manual therapy techniques: Soft tissue Mobilization were applied to the: right hip for 10 minutes including: Did not perform   In side lying with 2 pillows between knees - soft tissue mobilization around lateral hip incision and surrounding tissue to address tissue induration that remains; Encouraged Rod to elevate his leg in a reclined position to assist with swelling management - ankle pumps along with  heel to toe movement with gait to activate foot/calf pump.      The patient received the following direct contact modalities after being cleared for contraindications:     The patient received the following supervised modalities after being cleared for contradictions:     Written Home Exercises Provided:   Continue with HEP given to him by HHPT; add in hip flexor stretch and edema management techniques noted above.   Pt demo good understanding of the education provided. Rod demonstrated good return demonstration of activities.     Education provided re:  Rod verbalized good understanding of education provided.   No spiritual or educational barriers to learning provided    Assessment     Patient tolerated treatment well; Rod is making excellent progress with his post-op VLADIMIR rehab; Added some core/stabilization exercises today; squats - all done will after instruction.    This is a 75 y.o. male referred to outpatient physical therapy and presents with a medical diagnosis of Right VLADIMIR  and demonstrates limitations as described in the problem list. Pt prognosis is Good. Pt will continue to benefit from skilled outpatient physical therapy to address the deficits listed in the problem list, provide pt/family education and to maximize pt's level of independence in the home and community environment.     Goals as follows:    Long Term Goals: 6 weeks  Pain: Decrease pain to 0/10 to allow for improved ability to perform daily and recreational activities   Strength: Improve strength in bilateral Hip to 5/5 for improved LE stability  ROM: Improve ROM to functional limits in right hip - within any remaining hip precautions    Functional scale: Improve score on LEFS to <30% limitations   Lifting: Lift 20 lbs to waist level, and carry for 25 feet without pain or compensation  Walking: Increase walking distance/duration to 1/4 mile without pain  Postures: Increase sitting and/or standing duration to 30 mins without pain    Transfers: Perform sit <> Stand transfers without increased pain or limitation  Exercise: demonstrate independence with home exercise program to maintain gains made in therapy.       Plan     Continue with established Plan of Care towards PT goals. - 2 more visits before he returns to MD     Therapist: Rose Ballesteros, PT  2/14/2020

## 2020-02-17 ENCOUNTER — CLINICAL SUPPORT (OUTPATIENT)
Dept: REHABILITATION | Facility: HOSPITAL | Age: 75
End: 2020-02-17
Payer: MEDICARE

## 2020-02-17 DIAGNOSIS — R29.898 WEAKNESS OF RIGHT LOWER EXTREMITY: ICD-10-CM

## 2020-02-17 DIAGNOSIS — G89.29 CHRONIC HIP PAIN AFTER TOTAL REPLACEMENT OF RIGHT HIP JOINT: Primary | ICD-10-CM

## 2020-02-17 DIAGNOSIS — M25.551 CHRONIC HIP PAIN AFTER TOTAL REPLACEMENT OF RIGHT HIP JOINT: Primary | ICD-10-CM

## 2020-02-17 DIAGNOSIS — Z96.641 CHRONIC HIP PAIN AFTER TOTAL REPLACEMENT OF RIGHT HIP JOINT: Primary | ICD-10-CM

## 2020-02-17 PROCEDURE — 97110 THERAPEUTIC EXERCISES: CPT | Mod: PN

## 2020-02-17 NOTE — PROGRESS NOTES
Physical Therapy Daily Note     Name: Rod De La Paz  Clinic Number: [id number]  Diagnosis:   Encounter Diagnoses   Name Primary?    Chronic hip pain after total replacement of right hip joint Yes    Weakness of right lower extremity      Physician: Luther Fischer MD  Precautions: total hip precautions   Visit #: 10 of 18  PTA Visit #: 0  Time In: 10:00 AM   Time Out: 11:30 AM        Subjective     Pt reports:    Rod continues to do well; noting a little anterior hip pain, but stated that it's not there all of the time - feel its exercise related. .   Pain Scale: Rod rates pain on a scale of 0-10 to be 2-3 currently.    Objective     Rod received individual therapeutic exercises to develop strength, ROM, posture and core stabilization for 50  minutes including:  Nu-Step - Level 6 x 20 mins  - Recumbent Bike today - Level 6 x 20 mins   Standing: Toe-Taps on Air-Ex to  Silver step x 3 mins   Step-ups onto green/purple - 3 mins - switch lead leg 1/2 way  Marching - knees up - flex to 90 x 1 min - added light green band resistance   Standing: Bilateral hip extension, abduction and flexion with green Tband x 15 each   Side-Step - blue band x  2 1/2 mins  Heel raises x 20  Supine: hip flexion - knee lifts to 90 degrees x 10   Supine: SLR x 20  Supine: bridging x 20  Supine - ball squeezes x 3 mins - with pelvic tilts   S/L: clams x 15   S/L Hip abduction x 15   Prone - knee flexion x 20  Prone hip extension x 20     Added:   Cable Cross:   Paloff Presses - 7# x 10 reps each side  Scapular Retraction - 10# x 15 reps   High > Low 10# x 10  Low > High 10# x 10   Wall Squats with SB x 15     Rod received the following manual therapy techniques: Soft tissue Mobilization were applied to the: right hip for 10 minutes including: Did not perform   In side lying with 2 pillows between knees - soft tissue mobilization around lateral hip incision and surrounding tissue to  address tissue induration that remains; Encouraged Rod to elevate his leg in a reclined position to assist with swelling management - ankle pumps along with heel to toe movement with gait to activate foot/calf pump.      The patient received the following direct contact modalities after being cleared for contraindications:     The patient received the following supervised modalities after being cleared for contradictions:     Written Home Exercises Provided:   Continue with HEP given to him by HHPT; add in hip flexor stretch and edema management techniques noted above.   Pt demo good understanding of the education provided. Rod demonstrated good return demonstration of activities.     Education provided re:  Rod verbalized good understanding of education provided.   No spiritual or educational barriers to learning provided    Assessment     Patient tolerated treatment well; Rod is making excellent progress with his post-op VLADIMIR rehab; Added some core/stabilization exercises today; squats - all done will after instruction.    This is a 75 y.o. male referred to outpatient physical therapy and presents with a medical diagnosis of Right VLADIMIR  and demonstrates limitations as described in the problem list. Pt prognosis is Good. Pt will continue to benefit from skilled outpatient physical therapy to address the deficits listed in the problem list, provide pt/family education and to maximize pt's level of independence in the home and community environment.     Goals as follows:    Long Term Goals: 6 weeks  Pain: Decrease pain to 0/10 to allow for improved ability to perform daily and recreational activities   Strength: Improve strength in bilateral Hip to 5/5 for improved LE stability  ROM: Improve ROM to functional limits in right hip - within any remaining hip precautions    Functional scale: Improve score on LEFS to <30% limitations   Lifting: Lift 20 lbs to waist level, and carry for 25 feet without pain or  compensation  Walking: Increase walking distance/duration to 1/4 mile without pain  Postures: Increase sitting and/or standing duration to 30 mins without pain   Transfers: Perform sit <> Stand transfers without increased pain or limitation  Exercise: demonstrate independence with home exercise program to maintain gains made in therapy.       Plan     Continue with established Plan of Care towards PT goals. - 1 more visits before he returns to MD     Therapist: Rose Ballesteros, PT  2/17/2020

## 2020-02-18 ENCOUNTER — CLINICAL SUPPORT (OUTPATIENT)
Dept: REHABILITATION | Facility: HOSPITAL | Age: 75
End: 2020-02-18
Payer: MEDICARE

## 2020-02-18 DIAGNOSIS — R29.898 WEAKNESS OF RIGHT LOWER EXTREMITY: ICD-10-CM

## 2020-02-18 DIAGNOSIS — Z96.641 CHRONIC HIP PAIN AFTER TOTAL REPLACEMENT OF RIGHT HIP JOINT: Primary | ICD-10-CM

## 2020-02-18 DIAGNOSIS — M25.551 CHRONIC HIP PAIN AFTER TOTAL REPLACEMENT OF RIGHT HIP JOINT: Primary | ICD-10-CM

## 2020-02-18 DIAGNOSIS — G89.29 CHRONIC HIP PAIN AFTER TOTAL REPLACEMENT OF RIGHT HIP JOINT: Primary | ICD-10-CM

## 2020-02-18 PROCEDURE — 97110 THERAPEUTIC EXERCISES: CPT | Mod: PN

## 2020-02-18 NOTE — PROGRESS NOTES
Physical Therapy Daily Note     Name: Rod De La Paz  Clinic Number: [id number]  Diagnosis:   Encounter Diagnoses   Name Primary?    Weakness of right lower extremity     Chronic hip pain after total replacement of right hip joint Yes     Physician: Luther Fischer MD  Precautions: total hip precautions   Visit #: 11 of 18  PTA Visit #: 0  Time In: 10:00 AM   Time Out: 11:25 AM      Subjective     Pt reports:    Rod continues to do well; noting a little anterior hip pain, but stated that it's not there all of the time.   Pain Scale: Rod rates pain on a scale of 0-10 to be 2-3 currently.    Objective     Rod received individual therapeutic exercises to develop strength, ROM, posture and core stabilization for 50  minutes including:  Nu-Step - Level 6 x 20 mins  - Recumbent Bike today - Level 6 x 20 mins   Standing: Toe-Taps on Air-Ex to  Silver step x 3 mins   Step-ups - onto silver step today - leading with RLE - slight flexed knee, but able to perform without UE assist.   Marching - knees up - flex to 90 x 1 min - added light green band resistance   Standing: Bilateral hip extension, abduction and flexion with green Tband x 15 each   Side-Step - blue band x  2 1/2 mins  Heel raises x 20  H/S curls on Quantum - 40#   Supine: hip flexion - knee lifts to 90 degrees x 10   Supine: SLR x 20  Supine: bridging x 20  Supine - ball squeezes x 3 mins - with pelvic tilts   S/L: clams x 15   S/L Hip abduction x 15   Prone - knee flexion x 20  Prone hip extension x 20     Added:   Cable Cross:   Paloff Presses - 7# x 10 reps each side  Scapular Retraction - 10# x 15 reps   High > Low 10# x 10  Low > High 10# x 10   Wall Squats with SB x 15     Reviewed doorway stretch for Right hip flexor stretching with Rod - how to activate glute to increase stretch on hip flexor.     Rod received the following manual therapy techniques: Soft tissue Mobilization were applied to the:  right hip for 10 minutes including: Did not perform   In side lying with 2 pillows between knees - soft tissue mobilization around lateral hip incision and surrounding tissue to address tissue induration that remains; Encouraged Rod to elevate his leg in a reclined position to assist with swelling management - ankle pumps along with heel to toe movement with gait to activate foot/calf pump.      The patient received the following direct contact modalities after being cleared for contraindications:     The patient received the following supervised modalities after being cleared for contradictions:     Written Home Exercises Provided:   Continue with HEP given to him by HHPT; add in hip flexor stretch and edema management techniques noted above.   Pt demo good understanding of the education provided. Rod demonstrated good return demonstration of activities.     Education provided re:  Rod verbalized good understanding of education provided.   No spiritual or educational barriers to learning provided    Assessment     Patient tolerated treatment well; Rod is making excellent progress with his post-op VLADIMIR rehab; Added some core/stabilization exercises today; squats - all done will after instruction.    This is a 75 y.o. male referred to outpatient physical therapy and presents with a medical diagnosis of Right VLADIMIR  and demonstrates limitations as described in the problem list. Pt prognosis is Good. Pt will continue to benefit from skilled outpatient physical therapy to address the deficits listed in the problem list, provide pt/family education and to maximize pt's level of independence in the home and community environment.     Goals as follows:    Long Term Goals: 6 weeks  Pain: Decrease pain to 0/10 to allow for improved ability to perform daily and recreational activities   Strength: Improve strength in bilateral Hip to 5/5 for improved LE stability  ROM: Improve ROM to functional limits in right hip -  within any remaining hip precautions    Functional scale: Improve score on LEFS to <30% limitations   Lifting: Lift 20 lbs to waist level, and carry for 25 feet without pain or compensation  Walking: Increase walking distance/duration to 1/4 mile without pain  Postures: Increase sitting and/or standing duration to 30 mins without pain   Transfers: Perform sit <> Stand transfers without increased pain or limitation  Exercise: demonstrate independence with home exercise program to maintain gains made in therapy.       Plan     Continue with established Plan of Care towards PT goals.  Progress report to  MD     Therapist: Rose Ballesteros, PT  2/18/2020

## 2020-02-19 NOTE — PROGRESS NOTES
"Therapy Evaluation    Patient Name: Rod De La Paz  Date of Visit: 2/18/2020  MRN: [id number]  Diagnosis:   Encounter Diagnoses   Name Primary?    Weakness of right lower extremity     Chronic hip pain after total replacement of right hip joint Yes     Referring Physician: Luther Fischer MD      Subjective   Rod has completed 11 of 18 visits since initiating Physical Therapy on January 27, 2020.  He is demonstrating excellent progress with his RLE - S/P Right VLADIMIR.  Subjective pain is never more than 1-2/10 with activity in therapy or with home activities.  Rod is still not driving, but is ready to do so.     Objective    Rod has been ambulatory without use of an AD for over two weeks now.  He is able to demonstrate a stable gait, but does have some mild trendelenburg pattern secondary to weakness in his Right gluteal and lateral hip muscles.  Overall Rod is demonstrating Functional AROM in his Right hip within VLADIMIR movement restrictions.  Strength in Right Hip is generally 4/5; Right knee 5/5; No sensory deficits noted throughout RLE. He is able to go up/down steps without use of railing - one foot per step; able to step up/down from ground onto 10" step leading with RLE - but unable to fully extend his knee yet.     He still has some mild tightness in Right hip flexors - reviewed standing doorway stretch for this again yesterday.   Now that his hip is stronger, we have started more general CORE muscle exercises to address weakness here as well.    Rod is very consistent with all appointments and with performance of HEP.     Assessment  Rod is a 75 y.o. male referred to outpatient Physical Therapy and presents with a medical diagnosis of s/p Right VLADIMIR .   Rod garrisonuld benefit from continued Skilled Physical Therapy to address strength and overall functional mobility deficits.  Discussed this with him and he is in agreement with this plan.     Long Term Goals: 6 weeks - Will continue goals as noted " below:    Pain: Decrease pain to 0/10 to allow for improved ability to perform daily and recreational activities   Strength: Improve strength in bilateral Hip to 5/5 for improved LE stability  ROM: Improve ROM to functional limits in right hip - within any remaining hip precautions    Functional scale: Improve score on LEFS to <30% limitations   Lifting: Lift 20 lbs to waist level, and carry for 25 feet without pain or compensation  Walking: Increase walking distance/duration to 1/4 mile without pain  Postures: Increase sitting and/or standing duration to 30 mins without pain   Transfers: Perform sit <> Stand transfers without increased pain or limitation  Exercise: demonstrate independence with home exercise program to maintain gains made in therapy.          Plan     Recommend continuing Skilled Physical Therapy 2-3x/week x 6 weeks. For Strength, Endurance and overall functional mobility training.       I CERTIFY THE NEED FOR THESE SERVICES FURNISHED UNDER THIS PLAN OF TREATMENT AND WHILE UNDER MY CARE.    PHYSICIANS COMMENTS:    ______________________________________________________________________      PHYSICIAN'S NAME: ____________________________________________________

## 2020-03-09 ENCOUNTER — CLINICAL SUPPORT (OUTPATIENT)
Dept: REHABILITATION | Facility: HOSPITAL | Age: 75
End: 2020-03-09
Payer: MEDICARE

## 2020-03-09 DIAGNOSIS — R29.898 WEAKNESS OF RIGHT LOWER EXTREMITY: Primary | ICD-10-CM

## 2020-03-09 DIAGNOSIS — M25.551 CHRONIC HIP PAIN AFTER TOTAL REPLACEMENT OF RIGHT HIP JOINT: ICD-10-CM

## 2020-03-09 DIAGNOSIS — G89.29 CHRONIC HIP PAIN AFTER TOTAL REPLACEMENT OF RIGHT HIP JOINT: ICD-10-CM

## 2020-03-09 DIAGNOSIS — Z96.641 CHRONIC HIP PAIN AFTER TOTAL REPLACEMENT OF RIGHT HIP JOINT: ICD-10-CM

## 2020-03-09 PROCEDURE — 97140 MANUAL THERAPY 1/> REGIONS: CPT | Mod: PN

## 2020-03-09 PROCEDURE — 97110 THERAPEUTIC EXERCISES: CPT | Mod: PN

## 2020-03-09 NOTE — PROGRESS NOTES
"                                                    Physical Therapy Daily Note     Name: Rod De La Paz  Clinic Number: [id number]  Diagnosis:   Encounter Diagnoses   Name Primary?    Weakness of right lower extremity Yes    Chronic hip pain after total replacement of right hip joint      Physician: Luther Fischer MD  Precautions: total hip precautions   Visit #: 12 of 18  PTA Visit #: 0  Time In: 7:00 AM   Time Out: 8:20 AM      Subjective     Pt reports:    Rod has been on hold for two weeks following nenita surgery on his Right anterior lower leg that required stiches and removal of growth on his right ear. Rod stated that his right gluteal is sore - he stated that he went to chip/putt a few balls yesterday - didn't do anything excessive ....."You don't think I did any damage to my hip do you?"   Pain Scale: Rod rates pain on a scale of 0-10 to be 2-3 currently.    Objective     Rod received individual therapeutic exercises to develop strength, ROM, posture and core stabilization for 50  minutes including:  Nu-Step - Level 6 x 20 mins  - Recumbent Bike today - Level 5 x 20 mins   Standing: Toe-Taps on Air-Ex to  Silver step x 3 mins   Step-ups - onto green  - leading with RLE - slight flexed knee, but able to perform without UE assist.  - performed front and lateral   Marching - knees up - flex to 90 x 1 min - added light green band resistance   Standing: Bilateral hip extension, abduction and flexion with green Tband x 15 each   Side-Step - blue band x  2 1/2 mins  Heel raises x 20  H/S curls on Quantum - 40#   Supine: hip flexion - knee lifts to 90 degrees x 10   Supine: SLR x 20  Supine: bridging x 20  Supine - ball squeezes x 3 mins - with pelvic tilts   S/L: clams x 15   S/L Hip abduction x 15   Prone - knee flexion x 20  Prone hip extension x 20     Added: did not perform today   Cable Cross:   Paloff Presses - 7# x 10 reps each side  Scapular Retraction - 10# x 15 reps   High > Low 10# x 10  Low > " High 10# x 10   Wall Squats with SB x 15     Reviewed doorway stretch for Right hip flexor stretching with Rdo - how to activate glute to increase stretch on hip flexor.     Rod received the following manual therapy techniques: Soft tissue Mobilization were applied to the: right hip for 10 minutes including:   In side lying with 2 pillows between knees - soft tissue mobilization of gluteal medius, mukesh, piriformis to address tissue tightness and soreness; Contract relax of piriformis to decrease trigger point.      The patient received the following direct contact modalities after being cleared for contraindications:     The patient received the following supervised modalities after being cleared for contradictions:     Written Home Exercises Provided:   Continue with HEP given to him by HHPT; add in hip flexor stretch and edema management techniques noted above.   Pt demo good understanding of the education provided. Rod demonstrated good return demonstration of activities.     Education provided re:  Rod verbalized good understanding of education provided.   No spiritual or educational barriers to learning provided    Assessment     Patient tolerated treatment well; Rod still ambulates with a slight limp- lack of full knee extension on right noted on stance; Rod will continue to benefit from therapy to address hip weakness/pain     This is a 75 y.o. male referred to outpatient physical therapy and presents with a medical diagnosis of Right VLADIMIR  and demonstrates limitations as described in the problem list. Pt prognosis is Good. Pt will continue to benefit from skilled outpatient physical therapy to address the deficits listed in the problem list, provide pt/family education and to maximize pt's level of independence in the home and community environment.     Goals as follows:    Long Term Goals: 6 weeks  Pain: Decrease pain to 0/10 to allow for improved ability to perform daily and recreational  activities   Strength: Improve strength in bilateral Hip to 5/5 for improved LE stability  ROM: Improve ROM to functional limits in right hip - within any remaining hip precautions    Functional scale: Improve score on LEFS to <30% limitations   Lifting: Lift 20 lbs to waist level, and carry for 25 feet without pain or compensation  Walking: Increase walking distance/duration to 1/4 mile without pain  Postures: Increase sitting and/or standing duration to 30 mins without pain   Transfers: Perform sit <> Stand transfers without increased pain or limitation  Exercise: demonstrate independence with home exercise program to maintain gains made in therapy.       Plan     Continue with established Plan of Care towards PT goals.  2-3 x /week x 4-6 weeks     Therapist: Rose Ballesteros, PT  3/9/2020

## 2020-03-11 ENCOUNTER — CLINICAL SUPPORT (OUTPATIENT)
Dept: REHABILITATION | Facility: HOSPITAL | Age: 75
End: 2020-03-11
Payer: MEDICARE

## 2020-03-11 DIAGNOSIS — R29.898 WEAKNESS OF RIGHT LOWER EXTREMITY: ICD-10-CM

## 2020-03-11 DIAGNOSIS — M25.551 CHRONIC HIP PAIN AFTER TOTAL REPLACEMENT OF RIGHT HIP JOINT: Primary | ICD-10-CM

## 2020-03-11 DIAGNOSIS — G89.29 CHRONIC HIP PAIN AFTER TOTAL REPLACEMENT OF RIGHT HIP JOINT: Primary | ICD-10-CM

## 2020-03-11 DIAGNOSIS — Z96.641 CHRONIC HIP PAIN AFTER TOTAL REPLACEMENT OF RIGHT HIP JOINT: Primary | ICD-10-CM

## 2020-03-11 PROCEDURE — 97110 THERAPEUTIC EXERCISES: CPT | Mod: PN

## 2020-03-11 NOTE — PROGRESS NOTES
Physical Therapy Daily Note     Name: Rod De La Paz  Clinic Number: [id number]  Diagnosis:   Encounter Diagnoses   Name Primary?    Chronic hip pain after total replacement of right hip joint Yes    Weakness of right lower extremity      Physician: Luther Fischer MD  Precautions: total hip precautions   Visit #: 14 of 18  PTA Visit #: 0  Time In: 10:00 AM   Time Out:  11:10 AM      Subjective     Pt reports:    Rod is reporting no new complaints;  Stated that his right hip/gluteal mm felt much better today.   Pain Scale: Rod rates pain on a scale of 0-10 to be 1-2 currently.    Objective     Rod received individual therapeutic exercises to develop strength, ROM, posture and core stabilization for 50  minutes including:     Recumbent Bike today - Level 5 x 20 mins   Standing: Toe-Taps on Air-Ex to  Silver step x 3 mins   Step-ups - onto green  - leading with RLE - slight flexed knee, but able to perform without UE assist.  - performed front and lateral   Marching - knees up - flex to 90 x 1 min - added light green band resistance   Standing: Bilateral hip extension, abduction and flexion with green Tband x 15 each   Side-Step - blue band x  2 1/2 mins  Heel raises x 20  H/S curls on Quantum - 40#   Supine: hip flexion - knee lifts to 90 degrees x 10   Supine: SLR x 20  Supine: bridging x 20  Supine - ball squeezes x 3 mins - with pelvic tilts   S/L: clams x 15   S/L Hip abduction x 15   Prone - knee flexion x 20  Prone hip extension x 20     Added: did not perform today   Cable Cross:   Paloff Presses - 7# x 10 reps each side  Scapular Retraction - 10# x 15 reps   High > Low 10# x 10  Low > High 10# x 10   Wall Squats with SB x 15       Rod received the following manual therapy techniques: Soft tissue Mobilization were applied to the: right hip for 5 minutes including:   In side lying with 2 pillows between knees - soft tissue mobilization of gluteal medius,  mukesh, piriformis to address tissue tightness and soreness; Contract relax of piriformis to decrease trigger point.      The patient received the following direct contact modalities after being cleared for contraindications:     The patient received the following supervised modalities after being cleared for contradictions:     Written Home Exercises Provided:   Sit <> Stand; Squats; Arabesque position with toe taps on back leg; Psoas strengthening - marching with band around toes; Hip extension, abduction with band    Pt demo good understanding of the education provided. Rod demonstrated good return demonstration of activities.     Education provided re:  Rod verbalized good understanding of education provided.   No spiritual or educational barriers to learning provided    Assessment     Patient tolerated treatment well; Rod still ambulates with a slight limp- lack of full knee extension on right noted on stance; Rod will continue to benefit from therapy to address hip weakness/pain     This is a 75 y.o. male referred to outpatient physical therapy and presents with a medical diagnosis of Right VLADIMIR  and demonstrates limitations as described in the problem list. Pt prognosis is Good. Pt will continue to benefit from skilled outpatient physical therapy to address the deficits listed in the problem list, provide pt/family education and to maximize pt's level of independence in the home and community environment.     Goals as follows:    Long Term Goals: 6 weeks  Pain: Decrease pain to 0/10 to allow for improved ability to perform daily and recreational activities   Strength: Improve strength in bilateral Hip to 5/5 for improved LE stability  ROM: Improve ROM to functional limits in right hip - within any remaining hip precautions    Functional scale: Improve score on LEFS to <30% limitations   Lifting: Lift 20 lbs to waist level, and carry for 25 feet without pain or compensation  Walking: Increase walking  distance/duration to 1/4 mile without pain  Postures: Increase sitting and/or standing duration to 30 mins without pain   Transfers: Perform sit <> Stand transfers without increased pain or limitation  Exercise: demonstrate independence with home exercise program to maintain gains made in therapy.       Plan     Continue with established Plan of Care towards PT goals.  2-3 x /week x 4-6 weeks     Therapist: Rose Ballesteros, PT  3/11/2020

## 2020-03-13 ENCOUNTER — CLINICAL SUPPORT (OUTPATIENT)
Dept: REHABILITATION | Facility: HOSPITAL | Age: 75
End: 2020-03-13
Payer: MEDICARE

## 2020-03-13 DIAGNOSIS — M25.551 CHRONIC HIP PAIN AFTER TOTAL REPLACEMENT OF RIGHT HIP JOINT: ICD-10-CM

## 2020-03-13 DIAGNOSIS — G89.29 CHRONIC HIP PAIN AFTER TOTAL REPLACEMENT OF RIGHT HIP JOINT: ICD-10-CM

## 2020-03-13 DIAGNOSIS — R29.898 WEAKNESS OF RIGHT LOWER EXTREMITY: Primary | ICD-10-CM

## 2020-03-13 DIAGNOSIS — Z96.641 CHRONIC HIP PAIN AFTER TOTAL REPLACEMENT OF RIGHT HIP JOINT: ICD-10-CM

## 2020-03-13 PROCEDURE — 97110 THERAPEUTIC EXERCISES: CPT | Mod: PN

## 2020-03-13 NOTE — PROGRESS NOTES
Physical Therapy Daily Note     Name: Rod De La Paz  Clinic Number: [id number]  Diagnosis:   Encounter Diagnoses   Name Primary?    Weakness of right lower extremity Yes    Chronic hip pain after total replacement of right hip joint      Physician: Luther Fischer MD  Precautions: total hip precautions   Visit #: 14 of 18  PTA Visit #: 0  Time In: 10:00 AM   Time Out:  11:10 AM      Subjective     Pt reports:    Rod is reporting no new complaints;  Stated that his right hip/gluteal mm felt much better today.   Pain Scale: Rod rates pain on a scale of 0-10 to be 1-2 currently.    Objective     Rod received individual therapeutic exercises to develop strength, ROM, posture and core stabilization for 50  minutes including:     Recumbent Bike today - Level 5 x 20 mins   Standing: Toe-Taps on Air-Ex to  Silver step x 3 mins   Step-ups - onto green  - leading with RLE - slight flexed knee, but able to perform without UE assist.  - performed front and lateral   Marching - knees up - flex to 90 x 1 min - added light green band resistance   Standing: Bilateral hip extension, abduction and flexion with green Tband x 15 each   Side-Step - blue band x  2 1/2 mins  Heel raises x 20  H/S curls on Quantum - 40#   Supine: hip flexion - knee lifts to 90 degrees x 10   Supine: SLR x 20  Supine: bridging x 20  Supine - ball squeezes x 3 mins - with pelvic tilts   S/L: clams x 15   S/L Hip abduction x 15   Prone - knee flexion x 20  Prone hip extension x 20     Added: did not perform today   Cable Cross:   Paloff Presses - 7# x 10 reps each side  Scapular Retraction - 10# x 15 reps   High > Low 10# x 10  Low > High 10# x 10   Wall Squats with SB x 15       Rod received the following manual therapy techniques: Soft tissue Mobilization were applied to the: right hip for 5 minutes including:   In side lying with 2 pillows between knees - soft tissue mobilization of gluteal medius,  mukesh, piriformis to address tissue tightness and soreness; Contract relax of piriformis to decrease trigger point.      The patient received the following direct contact modalities after being cleared for contraindications:     The patient received the following supervised modalities after being cleared for contradictions:     Written Home Exercises Provided:   Sit <> Stand; Squats; Arabesque position with toe taps on back leg; Psoas strengthening - marching with band around toes; Hip extension, abduction with band    Pt demo good understanding of the education provided. Rod demonstrated good return demonstration of activities.     Education provided re:  Rod verbalized good understanding of education provided.   No spiritual or educational barriers to learning provided    Assessment     Patient tolerated treatment well; Rod still ambulates with a slight limp- lack of full knee extension on right noted on stance; Rod will continue to benefit from therapy to address hip weakness/pain     This is a 75 y.o. male referred to outpatient physical therapy and presents with a medical diagnosis of Right VLADIMIR  and demonstrates limitations as described in the problem list. Pt prognosis is Good. Pt will continue to benefit from skilled outpatient physical therapy to address the deficits listed in the problem list, provide pt/family education and to maximize pt's level of independence in the home and community environment.     Goals as follows:    Long Term Goals: 12 weeks  Pain: Decrease pain to 0/10 to allow for improved ability to perform daily and recreational activities   Strength: Improve strength in bilateral Hip to 5/5 for improved LE stability  ROM: Improve ROM to functional limits in right hip - within any remaining hip precautions    Functional scale: Improve score on LEFS to <30% limitations   Lifting: Lift 20 lbs to waist level, and carry for 25 feet without pain or compensation  Walking: Increase walking  distance/duration to 1/4 mile without pain  Postures: Increase sitting and/or standing duration to 30 mins without pain   Transfers: Perform sit <> Stand transfers without increased pain or limitation  Exercise: demonstrate independence with home exercise program to maintain gains made in therapy.       Plan     Continue with established Plan of Care towards PT goals.  2-3 x /week x 4-6 weeks     Therapist: Rose Ballesteros, PT  3/13/2020

## 2020-03-16 ENCOUNTER — CLINICAL SUPPORT (OUTPATIENT)
Dept: REHABILITATION | Facility: HOSPITAL | Age: 75
End: 2020-03-16
Payer: MEDICARE

## 2020-03-16 DIAGNOSIS — R29.898 WEAKNESS OF RIGHT LOWER EXTREMITY: ICD-10-CM

## 2020-03-16 DIAGNOSIS — M25.551 CHRONIC HIP PAIN AFTER TOTAL REPLACEMENT OF RIGHT HIP JOINT: Primary | ICD-10-CM

## 2020-03-16 DIAGNOSIS — Z96.641 CHRONIC HIP PAIN AFTER TOTAL REPLACEMENT OF RIGHT HIP JOINT: Primary | ICD-10-CM

## 2020-03-16 DIAGNOSIS — G89.29 CHRONIC HIP PAIN AFTER TOTAL REPLACEMENT OF RIGHT HIP JOINT: Primary | ICD-10-CM

## 2020-03-16 PROCEDURE — 97110 THERAPEUTIC EXERCISES: CPT | Mod: PN

## 2020-03-16 NOTE — PROGRESS NOTES
Physical Therapy Daily Note     Name: Rod De La Paz  Clinic Number: [id number]  Diagnosis:   Encounter Diagnoses   Name Primary?    Chronic hip pain after total replacement of right hip joint Yes    Weakness of right lower extremity      Physician: Luther Fischer MD  Precautions: total hip precautions   Visit #: 15 of 18  PTA Visit #: 0  Time In: 10:00 AM   Time Out:  11:15 AM     Subjective     Pt reports:    Rod is reporting no new complaints;  Stated that his right hip/gluteal mm felt much better today.   Pain Scale: Rod rates pain on a scale of 0-10 to be 1-2 currently.    Objective     Rod received individual therapeutic exercises to develop strength, ROM, posture and core stabilization for 50  minutes including:     Recumbent Bike today - Level 6 x 20 mins   Standing: Toe-Taps on Air-Ex to  Silver step x 3 mins   Step-ups - onto green front and lateral   Step-Downs from front and backwards step up with right   Marching - knees up - flex to 90 x 1 min - added light green band resistance   Standing: Bilateral hip extension, abduction and flexion with blue Tband x 15 each   Side-Step - blue band x  2 1/2 mins  Heel raises x 20  H/S curls on Quantum - 40#   Supine: hip flexion - knee lifts to 90 degrees x 10   Supine: SLR x 20  Supine: bridging x 20  Supine - ball squeezes x 3 mins - with pelvic tilts   S/L: clams x 15   S/L Hip abduction x 15   Prone - knee flexion x 20  Prone hip extension x 20     Added: did not perform today   Cable Cross:   Paloff Presses - 7# x 10 reps each side  Scapular Retraction - 10# x 15 reps   High > Low 10# x 10  Low > High 10# x 10   Wall Squats with SB x 15       Rod received the following manual therapy techniques: Soft tissue Mobilization were applied to the: right hip for 5 minutes including:   In side lying with 2 pillows between knees - soft tissue mobilization of gluteal medius, mukesh, piriformis to address tissue  tightness and soreness; Contract relax of piriformis to decrease trigger point.      The patient received the following direct contact modalities after being cleared for contraindications:     The patient received the following supervised modalities after being cleared for contradictions:     Written Home Exercises Provided:   Sit <> Stand; Squats; Arabesque position with toe taps on back leg; Psoas strengthening - marching with band around toes; Hip extension, abduction with band    Pt demo good understanding of the education provided. Rod demonstrated good return demonstration of activities.     Education provided re:  Rod verbalized good understanding of education provided.   No spiritual or educational barriers to learning provided    Assessment     Patient tolerated treatment well; Rod is walking better, less antalgia with stance on right; moving better, hitting golf balls at home. Feels like he is almost back to where he needs to be.      This is a 75 y.o. male referred to outpatient physical therapy and presents with a medical diagnosis of Right VLADIMIR  and demonstrates limitations as described in the problem list. Pt prognosis is Good. Pt will continue to benefit from skilled outpatient physical therapy to address the deficits listed in the problem list, provide pt/family education and to maximize pt's level of independence in the home and community environment.     Goals as follows:    Long Term Goals: 12 weeks  Pain: Decrease pain to 0/10 to allow for improved ability to perform daily and recreational activities   Strength: Improve strength in bilateral Hip to 5/5 for improved LE stability  ROM: Improve ROM to functional limits in right hip - within any remaining hip precautions    Functional scale: Improve score on LEFS to <30% limitations   Lifting: Lift 20 lbs to waist level, and carry for 25 feet without pain or compensation  Walking: Increase walking distance/duration to 1/4 mile without  pain  Postures: Increase sitting and/or standing duration to 30 mins without pain   Transfers: Perform sit <> Stand transfers without increased pain or limitation  Exercise: demonstrate independence with home exercise program to maintain gains made in therapy.       Plan     Continue with established Plan of Care towards PT goals.  Has 3 visits left on treatment plan     Therapist: Rose Ballesteros, PT  3/16/2020

## 2020-03-18 ENCOUNTER — CLINICAL SUPPORT (OUTPATIENT)
Dept: REHABILITATION | Facility: HOSPITAL | Age: 75
End: 2020-03-18
Payer: MEDICARE

## 2020-03-18 DIAGNOSIS — Z96.641 CHRONIC HIP PAIN AFTER TOTAL REPLACEMENT OF RIGHT HIP JOINT: ICD-10-CM

## 2020-03-18 DIAGNOSIS — M25.551 CHRONIC HIP PAIN AFTER TOTAL REPLACEMENT OF RIGHT HIP JOINT: ICD-10-CM

## 2020-03-18 DIAGNOSIS — G89.29 CHRONIC HIP PAIN AFTER TOTAL REPLACEMENT OF RIGHT HIP JOINT: ICD-10-CM

## 2020-03-18 DIAGNOSIS — R29.898 WEAKNESS OF RIGHT LOWER EXTREMITY: Primary | ICD-10-CM

## 2020-03-18 PROCEDURE — 97110 THERAPEUTIC EXERCISES: CPT | Mod: PN

## 2020-03-18 NOTE — PROGRESS NOTES
Physical Therapy Daily Note     Name: Rod De La Paz  Clinic Number: [id number]  Diagnosis:   Encounter Diagnoses   Name Primary?    Weakness of right lower extremity Yes    Chronic hip pain after total replacement of right hip joint      Physician: Luther Fischer MD  Precautions: total hip precautions   Visit #: 16 of 18  PTA Visit #: 0  Time In: 10:00 AM   Time Out: 11:15 AM     Subjective     Pt reports:    Rod is reporting no new complaints;  Stated that his right hip/gluteal mm felt much better today.   Pain Scale: Rod rates pain on a scale of 0-10 to be 1-2 currently.    Objective     Rod received individual therapeutic exercises to develop strength, ROM, posture and core stabilization for 50  minutes including:     Recumbent Bike today - Level 6 x 20 mins   Standing: Toe-Taps on Air-Ex to  Silver step x 3 mins   Step-ups - onto green front and lateral   Step-Downs from front and backwards step up with right   Marching - knees up - flex to 90 x 1 min - added light green band resistance   Standing: Bilateral hip extension, abduction and flexion with blue Tband x 15 each   Side-Step - blue band x  2 1/2 mins  Heel raises x 20  H/S curls on Quantum - 40#   Supine: hip flexion - knee lifts to 90 degrees x 10   Supine: SLR x 20  Supine: bridging x 20  Supine - ball squeezes x 3 mins - with pelvic tilts   S/L: clams x 15   S/L Hip abduction x 15   Prone - knee flexion x 20  Prone hip extension x 20     Added: did not perform today   Cable Cross:   Paloff Presses - 7# x 10 reps each side  Scapular Retraction - 10# x 15 reps   High > Low 10# x 10  Low > High 10# x 10   Wall Squats with SB x 15       Rod received the following manual therapy techniques: Soft tissue Mobilization were applied to the: right hip for 5 minutes including:   In side lying with 2 pillows between knees - soft tissue mobilization of gluteal medius, mukesh, piriformis to address tissue  tightness and soreness; Contract relax of piriformis to decrease trigger point.      The patient received the following direct contact modalities after being cleared for contraindications:     The patient received the following supervised modalities after being cleared for contradictions:     Written Home Exercises Provided:   Sit <> Stand; Squats; Arabesque position with toe taps on back leg; Psoas strengthening - marching with band around toes; Hip extension, abduction with band    Pt demo good understanding of the education provided. Rod demonstrated good return demonstration of activities.     Education provided re:  Rod verbalized good understanding of education provided.   No spiritual or educational barriers to learning provided    Assessment     Patient tolerated treatment well; Rod is walking better, less antalgia with stance on right; moving better, hitting golf balls at home. Feels like he is almost back to where he needs to be.      This is a 75 y.o. male referred to outpatient physical therapy and presents with a medical diagnosis of Right VLADIMIR  and demonstrates limitations as described in the problem list. Pt prognosis is Good. Pt will continue to benefit from skilled outpatient physical therapy to address the deficits listed in the problem list, provide pt/family education and to maximize pt's level of independence in the home and community environment.     Goals as follows:    Long Term Goals: 12 weeks  Pain: Decrease pain to 0/10 to allow for improved ability to perform daily and recreational activities   Strength: Improve strength in bilateral Hip to 5/5 for improved LE stability  ROM: Improve ROM to functional limits in right hip - within any remaining hip precautions    Functional scale: Improve score on LEFS to <30% limitations   Lifting: Lift 20 lbs to waist level, and carry for 25 feet without pain or compensation  Walking: Increase walking distance/duration to 1/4 mile without  pain  Postures: Increase sitting and/or standing duration to 30 mins without pain   Transfers: Perform sit <> Stand transfers without increased pain or limitation  Exercise: demonstrate independence with home exercise program to maintain gains made in therapy.       Plan     Continue with established Plan of Care towards PT goals.  Has 3 visits left on treatment plan     Therapist: Rose Ballesteros, PT  3/18/2020

## 2020-03-20 ENCOUNTER — CLINICAL SUPPORT (OUTPATIENT)
Dept: REHABILITATION | Facility: HOSPITAL | Age: 75
End: 2020-03-20
Payer: MEDICARE

## 2020-03-20 DIAGNOSIS — G89.29 CHRONIC HIP PAIN AFTER TOTAL REPLACEMENT OF RIGHT HIP JOINT: ICD-10-CM

## 2020-03-20 DIAGNOSIS — Z96.641 CHRONIC HIP PAIN AFTER TOTAL REPLACEMENT OF RIGHT HIP JOINT: ICD-10-CM

## 2020-03-20 DIAGNOSIS — M25.551 CHRONIC HIP PAIN AFTER TOTAL REPLACEMENT OF RIGHT HIP JOINT: ICD-10-CM

## 2020-03-20 DIAGNOSIS — R29.898 WEAKNESS OF RIGHT LOWER EXTREMITY: Primary | ICD-10-CM

## 2020-03-20 PROCEDURE — 97110 THERAPEUTIC EXERCISES: CPT | Mod: PN

## 2020-03-20 NOTE — PROGRESS NOTES
Physical Therapy Daily Note     Name: Rod De La Paz  Clinic Number: [id number]  Diagnosis:   Encounter Diagnoses   Name Primary?    Weakness of right lower extremity Yes    Chronic hip pain after total replacement of right hip joint      Physician: Luther Fischer MD  Precautions: total hip precautions   Visit #: 17 of 18  PTA Visit #: 0  Time In: 10:00  AM   Time Out: 11:11 AM     Subjective     Pt reports:    Rod is reporting no new complaints;  Stated that his right hip/gluteal mm felt much better today.   Pain Scale: Rod rates pain on a scale of 0-10 to be 1-2 currently.    Objective     Rod received individual therapeutic exercises to develop strength, ROM, posture and core stabilization for 50  minutes including:     Recumbent Bike today - Level 6 x 20 mins   Standing: Toe-Taps on Air-Ex to  Silver step x 3 mins   Step-ups - onto green front and lateral   Step-Downs from front and backwards step up with right   Marching - knees up - flex to 90 x 1 min - added light green band resistance   Standing: Bilateral hip extension, abduction and flexion with blue Tband x 15 each   Side-Step - blue band x  2 1/2 mins  Heel raises x 20  H/S curls on Quantum - 40#   Supine: hip flexion - knee lifts to 90 degrees x 10   Supine: SLR x 20  Supine: bridging x 20  Supine - ball squeezes x 3 mins - with pelvic tilts   S/L: clams x 15   S/L Hip abduction x 15   Prone - knee flexion x 20  Prone hip extension x 20     Added: did not perform today   Cable Cross:   Paloff Presses - 7# x 10 reps each side  Scapular Retraction - 10# x 15 reps   High > Low 10# x 10  Low > High 10# x 10   Wall Squats with SB x 15       Rod received the following manual therapy techniques: Soft tissue Mobilization were applied to the: right hip for 5 minutes including:   In side lying with 2 pillows between knees - soft tissue mobilization of gluteal medius, mukesh, piriformis to address tissue  tightness and soreness; Contract relax of piriformis to decrease trigger point.      The patient received the following direct contact modalities after being cleared for contraindications:     The patient received the following supervised modalities after being cleared for contradictions:     Written Home Exercises Provided:   Sit <> Stand; Squats; Arabesque position with toe taps on back leg; Psoas strengthening - marching with band around toes; Hip extension, abduction with band    Pt demo good understanding of the education provided. Rod demonstrated good return demonstration of activities.     Education provided re:  Rod verbalized good understanding of education provided.   No spiritual or educational barriers to learning provided    Assessment     Patient tolerated treatment well; Rod is walking better, less antalgia with stance on right; moving better, hitting golf balls at home. Feels like he is almost back to where he needs to be.      This is a 75 y.o. male referred to outpatient physical therapy and presents with a medical diagnosis of Right VLADIMIR  and demonstrates limitations as described in the problem list. Pt prognosis is Good. Pt will continue to benefit from skilled outpatient physical therapy to address the deficits listed in the problem list, provide pt/family education and to maximize pt's level of independence in the home and community environment.     Goals as follows:    Long Term Goals: 12 weeks  Pain: Decrease pain to 0/10 to allow for improved ability to perform daily and recreational activities   Strength: Improve strength in bilateral Hip to 5/5 for improved LE stability  ROM: Improve ROM to functional limits in right hip - within any remaining hip precautions    Functional scale: Improve score on LEFS to <30% limitations   Lifting: Lift 20 lbs to waist level, and carry for 25 feet without pain or compensation  Walking: Increase walking distance/duration to 1/4 mile without  pain  Postures: Increase sitting and/or standing duration to 30 mins without pain   Transfers: Perform sit <> Stand transfers without increased pain or limitation  Exercise: demonstrate independence with home exercise program to maintain gains made in therapy.       Plan     Continue with established Plan of Care towards PT goals.  Has 3 visits left on treatment plan     Therapist: Rose Ballesteros, PT  3/20/2020

## 2020-03-23 ENCOUNTER — CLINICAL SUPPORT (OUTPATIENT)
Dept: REHABILITATION | Facility: HOSPITAL | Age: 75
End: 2020-03-23
Payer: MEDICARE

## 2020-03-23 DIAGNOSIS — M25.551 CHRONIC HIP PAIN AFTER TOTAL REPLACEMENT OF RIGHT HIP JOINT: Primary | ICD-10-CM

## 2020-03-23 DIAGNOSIS — G89.29 CHRONIC HIP PAIN AFTER TOTAL REPLACEMENT OF RIGHT HIP JOINT: Primary | ICD-10-CM

## 2020-03-23 DIAGNOSIS — Z96.641 CHRONIC HIP PAIN AFTER TOTAL REPLACEMENT OF RIGHT HIP JOINT: Primary | ICD-10-CM

## 2020-03-23 DIAGNOSIS — R29.898 WEAKNESS OF RIGHT LOWER EXTREMITY: ICD-10-CM

## 2020-03-23 PROCEDURE — 97110 THERAPEUTIC EXERCISES: CPT | Mod: PN

## 2020-03-23 NOTE — PROGRESS NOTES
Physical Therapy Daily Note     Name: Rod De La Paz  Clinic Number: [id number]  Diagnosis:   Encounter Diagnoses   Name Primary?    Chronic hip pain after total replacement of right hip joint Yes    Weakness of right lower extremity      Physician: Luther Fischer MD  Precautions: total hip precautions   Visit #: 18 of 18  PTA Visit #: 0  Time In: 11:00 AM  Time Out: 12:10 PM     Subjective     Pt reports:    Rod is reporting no new complaints;  Stated that every now and then he is getting some minor discomfort with sitting on harder surfaces, but this is all. Continues to ambulate, practicing putting/chipping at home.   Pain Scale: Rod rates pain on a scale of 0-10 to be 1-2 currently.    Objective     Rod received individual therapeutic exercises to develop strength, ROM, posture and core stabilization for 50  minutes including:     Recumbent Bike today - Level 6 x 20 mins   Standing: Toe-Taps on Air-Ex to  Silver step x 3 mins   Step-ups - onto green front and lateral   Step-Downs from front and backwards step up with right   Marching - knees up - flex to 90 x 1 min - added light green band resistance   Standing: Bilateral hip extension, abduction and flexion with blue Tband x 15 each   Side-Step - blue band x  2 1/2 mins  Heel raises x 20  H/S curls on Quantum - 40#   Supine: hip flexion - knee lifts to 90 degrees x 10   Supine: SLR x 20  Supine: bridging x 20  Supine - ball squeezes x 3 mins - with pelvic tilts   S/L: clams x 15   S/L Hip abduction x 15   Prone - knee flexion x 20  Prone hip extension x 20     Added: did not perform today   Cable Cross:   Paloff Presses - 7# x 10 reps each side  Scapular Retraction - 10# x 15 reps   High > Low 10# x 10  Low > High 10# x 10   Wall Squats with SB x 15       Rod received the following manual therapy techniques: Soft tissue Mobilization were applied to the: right hip for 5 minutes including: Did not perform  today   In side lying with 2 pillows between knees - soft tissue mobilization of gluteal medius, mukesh, piriformis to address tissue tightness and soreness; Contract relax of piriformis to decrease trigger point.      Written Home Exercises Provided:   Sit <> Stand; Squats; Arabesque position with toe taps on back leg; Psoas strengthening - marching with band around toes; Hip extension, abduction with band    Pt demo good understanding of the education provided. Rod demonstrated good return demonstration of activities.     Education provided re:  Rod verbalized good understanding of education provided.   No spiritual or educational barriers to learning provided    Assessment     Patient tolerated treatment well; Rod is walking better, less antalgia with stance on right; moving better, hitting golf balls at home. Feels like he is almost back to where he needs to be, but also notes some weakness in hip at times.  Discussed continuing P.T. With Rod.  He is still walking with slight limp; unable to get himself up from the floor making exercise at home difficult; He wants to return to playing golf on a full time basis, but is not there yet.   I feel that he would benefit from continued Skilled Physical Therapy to address remaining mm weakness and gait deficits.  We agreed on decreasing tx to 2x/week.   This is a 75 y.o. male referred to outpatient physical therapy and presents with a medical diagnosis of Right VLADIMIR  and demonstrates limitations as described in the problem list. Pt prognosis is Good. Pt will continue to benefit from skilled outpatient physical therapy to address the deficits listed in the problem list, provide pt/family education and to maximize pt's level of independence in the home and community environment.     Goals as follows:    Long Term Goals: 12 weeks  Pain: Decrease pain to 0/10 to allow for improved ability to perform daily and recreational activities   Strength: Improve strength in  bilateral Hip to 5/5 for improved LE stability  ROM: Improve ROM to functional limits in right hip - within any remaining hip precautions    Functional scale: Improve score on LEFS to <30% limitations   Lifting: Lift 20 lbs to waist level, and carry for 25 feet without pain or compensation  Walking: Increase walking distance/duration to 1/4 mile without pain  Postures: Increase sitting and/or standing duration to 30 mins without pain   Transfers: Perform sit <> Stand transfers without increased pain or limitation  Exercise: demonstrate independence with home exercise program to maintain gains made in therapy.       Plan     Continue with established Plan of Care towards PT goals.   Treatment decreased to 2x/week x 4 weeks.     Therapist: Rose Ballesteros, PT  3/23/2020

## 2020-03-25 ENCOUNTER — CLINICAL SUPPORT (OUTPATIENT)
Dept: REHABILITATION | Facility: HOSPITAL | Age: 75
End: 2020-03-25
Payer: MEDICARE

## 2020-03-25 DIAGNOSIS — M25.551 CHRONIC HIP PAIN AFTER TOTAL REPLACEMENT OF RIGHT HIP JOINT: ICD-10-CM

## 2020-03-25 DIAGNOSIS — Z96.641 CHRONIC HIP PAIN AFTER TOTAL REPLACEMENT OF RIGHT HIP JOINT: ICD-10-CM

## 2020-03-25 DIAGNOSIS — R29.898 WEAKNESS OF RIGHT LOWER EXTREMITY: Primary | ICD-10-CM

## 2020-03-25 DIAGNOSIS — G89.29 CHRONIC HIP PAIN AFTER TOTAL REPLACEMENT OF RIGHT HIP JOINT: ICD-10-CM

## 2020-03-25 PROCEDURE — 97110 THERAPEUTIC EXERCISES: CPT | Mod: PN

## 2020-03-25 NOTE — PROGRESS NOTES
Physical Therapy Daily Note     Name: Rod De La Paz  Clinic Number: [id number]  Diagnosis:   Encounter Diagnoses   Name Primary?    Weakness of right lower extremity Yes    Chronic hip pain after total replacement of right hip joint      Physician: Luther Fischer MD  Precautions: total hip precautions   Visit #: 19  PTA Visit #: 0  Time In: 10:00 AM  Time Out:  11:10 AM     Subjective     Pt reports:    Rod is reporting no new complaints;    Pain Scale: Rod rates pain on a scale of 0-10 to be 1-2 currently.    Objective     Rod received individual therapeutic exercises to develop strength, ROM, posture and core stabilization for 50  minutes including:     Recumbent Bike today - Level 6 x 20 mins    Step-ups - onto green front and lateral - added 2 sets of purple risers   Step-Downs from front and backwards step up with right   Marching - knees up - flex to 90 x 1 min - added light green band resistance   Standing: Bilateral hip extension, abduction and flexion with blue Tband x 15 each   Side-Step - blue band x  2 1/2 mins  Heel raises x 20  H/S curls on Quantum - 40#   Supine: hip flexion - knee lifts to 90 degrees x 10   Supine: SLR x 20  Supine: bridging x 20  Supine - ball squeezes x 3 mins - with pelvic tilts   S/L: clams x 15   S/L Hip abduction x 15   Prone - knee flexion x 20  Prone hip extension x 20     Added: did not perform today   Cable Cross:   Paloff Presses - 7# x 10 reps each side  Scapular Retraction - 10# x 15 reps   High > Low 10# x 10  Low > High 10# x 10   Wall Squats with SB x 15       Rod received the following manual therapy techniques: Soft tissue Mobilization were applied to the: right hip for 5 minutes including: Did not perform today   In side lying with 2 pillows between knees - soft tissue mobilization of gluteal medius, mukesh, piriformis to address tissue tightness and soreness; Contract relax of piriformis to decrease  trigger point.      Written Home Exercises Provided:   Sit <> Stand; Squats; Arabesque position with toe taps on back leg; Psoas strengthening - marching with band around toes; Hip extension, abduction with band    Pt demo good understanding of the education provided. Rod demonstrated good return demonstration of activities.     Education provided re:  Rod verbalized good understanding of education provided.   No spiritual or educational barriers to learning provided    Assessment     Patient tolerated treatment well; Rod is walking better, less antalgia with stance on right; moving better, hitting golf balls at home. Feels like he is almost back to where he needs to be, but also notes some weakness in hip at times.  Discussed continuing P.T. With Rod.  He is still walking with slight limp; unable to get himself up from the floor making exercise at home difficult; He wants to return to playing golf on a full time basis, but is not there yet.   I feel that he would benefit from continued Skilled Physical Therapy to address remaining mm weakness and gait deficits.  We agreed on decreasing tx to 2x/week.   This is a 75 y.o. male referred to outpatient physical therapy and presents with a medical diagnosis of Right VLADIMIR  and demonstrates limitations as described in the problem list. Pt prognosis is Good. Pt will continue to benefit from skilled outpatient physical therapy to address the deficits listed in the problem list, provide pt/family education and to maximize pt's level of independence in the home and community environment.     Goals as follows:    Long Term Goals: 12 weeks  Pain: Decrease pain to 0/10 to allow for improved ability to perform daily and recreational activities   Strength: Improve strength in bilateral Hip to 5/5 for improved LE stability  ROM: Improve ROM to functional limits in right hip - within any remaining hip precautions    Functional scale: Improve score on LEFS to <30% limitations    Lifting: Lift 20 lbs to waist level, and carry for 25 feet without pain or compensation  Walking: Increase walking distance/duration to 1/4 mile without pain  Postures: Increase sitting and/or standing duration to 30 mins without pain   Transfers: Perform sit <> Stand transfers without increased pain or limitation  Exercise: demonstrate independence with home exercise program to maintain gains made in therapy.       Plan     Continue with established Plan of Care towards PT goals.   Treatment decreased to 2x/week x 4 weeks.     Therapist: Rose Ballesteros, PT  3/25/2020

## 2020-03-30 ENCOUNTER — CLINICAL SUPPORT (OUTPATIENT)
Dept: REHABILITATION | Facility: HOSPITAL | Age: 75
End: 2020-03-30
Payer: MEDICARE

## 2020-03-30 DIAGNOSIS — M25.551 CHRONIC HIP PAIN AFTER TOTAL REPLACEMENT OF RIGHT HIP JOINT: ICD-10-CM

## 2020-03-30 DIAGNOSIS — G89.29 CHRONIC HIP PAIN AFTER TOTAL REPLACEMENT OF RIGHT HIP JOINT: ICD-10-CM

## 2020-03-30 DIAGNOSIS — R29.898 WEAKNESS OF RIGHT LOWER EXTREMITY: Primary | ICD-10-CM

## 2020-03-30 DIAGNOSIS — Z96.641 CHRONIC HIP PAIN AFTER TOTAL REPLACEMENT OF RIGHT HIP JOINT: ICD-10-CM

## 2020-03-30 PROCEDURE — 97110 THERAPEUTIC EXERCISES: CPT | Mod: PN

## 2020-03-30 NOTE — PROGRESS NOTES
"                                                    Physical Therapy Daily Note     Name: Rod De La Paz  Clinic Number: [id number]  Diagnosis:   Encounter Diagnoses   Name Primary?    Weakness of right lower extremity Yes    Chronic hip pain after total replacement of right hip joint      Physician: Luther Fischer MD  Precautions: total hip precautions   Visit #: 20  PTA Visit #: 0  Time In: 10:00 AM  Time Out:  11: 00 AM     Subjective     Pt reports:    Rod reports that his hip is just a little sore; He played golf 4 days in a row - tried to hit out of the sand trap with his 4 iron - "I felt that"  Advised him to be cautious with twisting and hitting out of sand as well as grounding the club - too much twisting and force on his hip.   Pain Scale: Rod rates pain on a scale of 0-10 to be 1-2 currently.    Objective     Rod received individual therapeutic exercises to develop strength, ROM, posture and core stabilization for 50  minutes including:     Recumbent Bike today - Level 6 x 20 mins    Step-ups - onto green front and lateral - added 2 sets of purple risers   Step-Downs from front and backwards step up with right   Marching - knees up - flex to 90 x 1 min - added light green band resistance   Standing: Bilateral hip extension, abduction and flexion with blue Tband x 15 each   Side-Step - blue band x  2 1/2 mins  Heel raises x 20  H/S curls on Quantum - 40#   Supine: hip flexion - knee lifts to 90 degrees x 10   Supine: SLR x 20  Supine: bridging x 20  Supine - ball squeezes x 3 mins - with pelvic tilts   S/L: clams x 15   S/L Hip abduction x 15   Prone - knee flexion x 20  Prone hip extension x 20     Added: did not perform today   Cable Cross:   Paloff Presses - 7# x 10 reps each side  Scapular Retraction - 10# x 15 reps   High > Low 10# x 10  Low > High 10# x 10   Wall Squats with SB x 15       Written Home Exercises Provided:   Sit <> Stand; Squats; Arabesque position with toe taps on back leg; Psoas " strengthening - marching with band around toes; Hip extension, abduction with band    Pt demo good understanding of the education provided. Rod demonstrated good return demonstration of activities.     Education provided re:  Rod verbalized good understanding of education provided. Was going to take a few days off golf this week - knows he needs to be  Careful.   No spiritual or educational barriers to learning provided    Assessment     Patient tolerated treatment well;Reported less pain, hip felt better after exercise this morning; Rod is walking with a more upright gait pattern, less hip/trunk flexion noted on right with standing;    This is a 75 y.o. male referred to outpatient physical therapy and presents with a medical diagnosis of Right VLADIMIR  and demonstrates limitations as described in the problem list. Pt prognosis is Good. Pt will continue to benefit from skilled outpatient physical therapy to address the deficits listed in the problem list, provide pt/family education and to maximize pt's level of independence in the home and community environment.     Goals as follows:    Long Term Goals: 12 weeks  Pain: Decrease pain to 0/10 to allow for improved ability to perform daily and recreational activities   Strength: Improve strength in bilateral Hip to 5/5 for improved LE stability  ROM: Improve ROM to functional limits in right hip - within any remaining hip precautions    Functional scale: Improve score on LEFS to <30% limitations   Lifting: Lift 20 lbs to waist level, and carry for 25 feet without pain or compensation  Walking: Increase walking distance/duration to 1/4 mile without pain  Postures: Increase sitting and/or standing duration to 30 mins without pain   Transfers: Perform sit <> Stand transfers without increased pain or limitation  Exercise: demonstrate independence with home exercise program to maintain gains made in therapy.       Plan     Continue with established Plan of Care towards  PT goals.   Treatment decreased to 2x/week x 4 weeks.     Therapist: Rose Ballesteros, PT  3/30/2020

## 2020-05-07 ENCOUNTER — CLINICAL SUPPORT (OUTPATIENT)
Dept: REHABILITATION | Facility: HOSPITAL | Age: 75
End: 2020-05-07
Payer: MEDICARE

## 2020-05-07 DIAGNOSIS — M25.551 CHRONIC HIP PAIN AFTER TOTAL REPLACEMENT OF RIGHT HIP JOINT: Primary | ICD-10-CM

## 2020-05-07 DIAGNOSIS — Z96.641 CHRONIC HIP PAIN AFTER TOTAL REPLACEMENT OF RIGHT HIP JOINT: Primary | ICD-10-CM

## 2020-05-07 DIAGNOSIS — G89.29 CHRONIC HIP PAIN AFTER TOTAL REPLACEMENT OF RIGHT HIP JOINT: Primary | ICD-10-CM

## 2020-05-07 DIAGNOSIS — R29.898 WEAKNESS OF RIGHT LOWER EXTREMITY: ICD-10-CM

## 2020-05-07 PROCEDURE — 97110 THERAPEUTIC EXERCISES: CPT | Mod: PN

## 2020-05-07 NOTE — PROGRESS NOTES
Physical Therapy Daily Note     Name: Rod De La Paz  Clinic Number: [id number]  Diagnosis:   Encounter Diagnoses   Name Primary?    Chronic hip pain after total replacement of right hip joint Yes    Weakness of right lower extremity      Physician: Luther Fischer MD  Precautions: total hip precautions   Visit #: 21  PTA Visit #: 0  Time In:  9:00 AM   Time Out: 10:10 AM    Subjective     Pt reports:    Rod reports that he has been doing well; Playing golf 4-5x week;  No c/o's noted; exercising at home, now has [employer] tamara for weight loss, steps, phycological input for fitness.  Stated he has lost 10 lbs and is really feeling well.    Pain Scale: Rod rates pain on a scale of 0-10 to be 1-2 currently.    Objective     Rod received individual therapeutic exercises to develop strength, ROM, posture and core stabilization for 50  minutes including:     Recumbent Bike today - Level 6 x 20 mins    Step-ups - onto green front and lateral - added 2 sets of purple risers x 3 mins   Step-Downs from front and backwards step up with right   Marching - knees up - flex to 90 x 1 min - added light green band resistance   Standing: Bilateral hip extension, abduction and flexion with blue Tband x 15 each   Side-Step - blue band x  2 1/2 mins  Heel raises x 20  H/S curls on Quantum - 40#   Supine: hip flexion - knee lifts to 90 degrees x 10   Supine: SLR x 20  Supine: bridging x 20  Supine - ball squeezes x 3 mins - with pelvic tilts   S/L: clams x 15   S/L Hip abduction x 15   Prone - knee flexion x 20  Prone hip extension x 20     Added: did not perform today   Cable Cross:   Paloff Presses - 7# x 10 reps each side  Scapular Retraction - 10# x 15 reps   High > Low 10# x 10  Low > High 10# x 10   Wall Squats with SB x 15       Written Home Exercises Provided:   Sit <> Stand; Squats; Arabesque position with toe taps on back leg; Psoas strengthening - marching with band around  toes; Hip extension, abduction with band    Pt demo good understanding of the education provided. Rod demonstrated good return demonstration of activities.     Education provided re:  Rod verbalized good understanding of education provided. Was going to take a few days off golf this week - knows he needs to be  Careful.   No spiritual or educational barriers to learning provided    Assessment     Patient tolerated treatment very Rod wanted to come back for a few sessions before he sees his MD in a couple of weeks.  Right Hip AROM is WFL/WNL for 4 months sp right VLADIMIR; Strength is 4+/5 in hip girdle; 5/5 at knee; walking over [id number] steps per day, golfing, exercising, is progressing well.    This is a 75 y.o. male referred to outpatient physical therapy and presents with a medical diagnosis of Right VLADIMIR  and demonstrates limitations as described in the problem list. Pt prognosis is Good. Pt will continue to benefit from skilled outpatient physical therapy to address the deficits listed in the problem list, provide pt/family education and to maximize pt's level of independence in the home and community environment.     Goals as follows:    Long Term Goals: 12 weeks  Pain: Decrease pain to 0/10 to allow for improved ability to perform daily and recreational activities   Strength: Improve strength in bilateral Hip to 5/5 for improved LE stability  ROM: Improve ROM to functional limits in right hip - within any remaining hip precautions    Functional scale: Improve score on LEFS to <30% limitations   Lifting: Lift 20 lbs to waist level, and carry for 25 feet without pain or compensation  Walking: Increase walking distance/duration to 1/4 mile without pain  Postures: Increase sitting and/or standing duration to 30 mins without pain   Transfers: Perform sit <> Stand transfers without increased pain or limitation  Exercise: demonstrate independence with home exercise program to maintain gains made in therapy.       Plan      Continue with established Plan of Care towards PT goals.  Continue 2x/week x 3-4 weeks until MD appointment.      Therapist: Rose Ballesteros, PT  5/7/2020

## 2020-05-11 ENCOUNTER — CLINICAL SUPPORT (OUTPATIENT)
Dept: REHABILITATION | Facility: HOSPITAL | Age: 75
End: 2020-05-11
Payer: MEDICARE

## 2020-05-11 DIAGNOSIS — R29.898 WEAKNESS OF RIGHT LOWER EXTREMITY: Primary | ICD-10-CM

## 2020-05-11 DIAGNOSIS — M25.551 CHRONIC HIP PAIN AFTER TOTAL REPLACEMENT OF RIGHT HIP JOINT: ICD-10-CM

## 2020-05-11 DIAGNOSIS — G89.29 CHRONIC HIP PAIN AFTER TOTAL REPLACEMENT OF RIGHT HIP JOINT: ICD-10-CM

## 2020-05-11 DIAGNOSIS — Z96.641 CHRONIC HIP PAIN AFTER TOTAL REPLACEMENT OF RIGHT HIP JOINT: ICD-10-CM

## 2020-05-11 PROCEDURE — 97110 THERAPEUTIC EXERCISES: CPT | Mod: PN

## 2020-05-12 NOTE — PROGRESS NOTES
Physical Therapy Daily Note     Name: Rod De La Paz  Clinic Number: [id number]  Diagnosis:   Encounter Diagnoses   Name Primary?    Weakness of right lower extremity Yes    Chronic hip pain after total replacement of right hip joint      Physician: Luther Fischer MD  Precautions: total hip precautions   Visit #: 22  PTA Visit #: 0  Time In:  11:00 AM   Time Out: 12:10 PM     Subjective     Pt reports:    Rod reports that he continues to do well; Playing golf 4-5x week; Returns to [facility] in 2 weeks   Pain Scale: Rod rates pain on a scale of 0-10 to be 1-2 currently.    Objective     Rod received individual therapeutic exercises to develop strength, ROM, posture and core stabilization for 50  minutes including:     Recumbent Bike today - Level 6 x 20 mins    Step-ups - onto green front and lateral - added 2 sets of purple risers x 3 mins   Step-Downs from front and backwards step up with right   Marching - knees up - flex to 90 x 1 min - added light green band resistance   Standing: Bilateral hip extension, abduction and flexion with blue Tband x 15 each   Side-Step - blue band x  2 1/2 mins  Heel raises x 20  H/S curls on Quantum - 50# x 30   Quantum - TKE - 50# x 30  Supine: hip flexion - knee lifts to 90 degrees x 10   Supine: SLR x 20  Supine: bridging x 20  Supine - ball squeezes x 3 mins - with pelvic tilts   S/L: clams x 15   S/L Hip abduction x 15   Quadraped : alternate arm and leg x  12    Written Home Exercises Provided:   Sit <> Stand; Squats; Arabesque position with toe taps on back leg; Psoas strengthening - marching with band around toes; Hip extension, abduction with band    Pt demo good understanding of the education provided. Rod demonstrated good return demonstration of activities.     Education provided re:  Current exercise routine is working well for him; walking; still watching hip precautions; playing golf.    No spiritual or educational  barriers to learning provided    Assessment     Patient tolerated treatment very well.  Rod has made excellent progress with his hip rehab; Expect discharge following upcoming MD appointment.   Right Hip AROM is WFL/WNL for 4 months sp right VLADIMIR; Strength is 4+/5 in hip girdle; 5/5 at knee; walking over 07395 steps per day, golfing, exercising, is progressing well.    This is a 75 y.o. male referred to outpatient physical therapy and presents with a medical diagnosis of Right VLADIMIR  and demonstrates limitations as described in the problem list. Pt prognosis is Good. Pt will continue to benefit from skilled outpatient physical therapy to address the deficits listed in the problem list, provide pt/family education and to maximize pt's level of independence in the home and community environment.     Goals as follows:    Long Term Goals: 12 weeks  Pain: Decrease pain to 0/10 to allow for improved ability to perform daily and recreational activities   Strength: Improve strength in bilateral Hip to 5/5 for improved LE stability  ROM: Improve ROM to functional limits in right hip - within any remaining hip precautions    Functional scale: Improve score on LEFS to <30% limitations   Lifting: Lift 20 lbs to waist level, and carry for 25 feet without pain or compensation  Walking: Increase walking distance/duration to 1/4 mile without pain  Postures: Increase sitting and/or standing duration to 30 mins without pain   Transfers: Perform sit <> Stand transfers without increased pain or limitation  Exercise: demonstrate independence with home exercise program to maintain gains made in therapy.       Plan     Continue with established Plan of Care towards PT goals.  Continue 2x/week x 2-3 weeks until MD appointment.      Therapist: Rose Ballesteros, PT  5/12/2020

## 2020-05-14 ENCOUNTER — CLINICAL SUPPORT (OUTPATIENT)
Dept: REHABILITATION | Facility: HOSPITAL | Age: 75
End: 2020-05-14
Payer: MEDICARE

## 2020-05-14 DIAGNOSIS — M25.551 CHRONIC HIP PAIN AFTER TOTAL REPLACEMENT OF RIGHT HIP JOINT: ICD-10-CM

## 2020-05-14 DIAGNOSIS — Z96.641 CHRONIC HIP PAIN AFTER TOTAL REPLACEMENT OF RIGHT HIP JOINT: ICD-10-CM

## 2020-05-14 DIAGNOSIS — G89.29 CHRONIC HIP PAIN AFTER TOTAL REPLACEMENT OF RIGHT HIP JOINT: ICD-10-CM

## 2020-05-14 DIAGNOSIS — R29.898 WEAKNESS OF RIGHT LOWER EXTREMITY: Primary | ICD-10-CM

## 2020-05-14 PROCEDURE — 97110 THERAPEUTIC EXERCISES: CPT | Mod: PN

## 2020-05-14 NOTE — PROGRESS NOTES
Physical Therapy Daily Note     Name: Rod De La Paz  Clinic Number: [id number]  Diagnosis:   Encounter Diagnoses   Name Primary?    Weakness of right lower extremity Yes    Chronic hip pain after total replacement of right hip joint      Physician: Luther Fischer MD  Precautions: total hip precautions   Visit #: 23  PTA Visit #: 0  Time In:  9:00 AM   Time Out:   10:15 AM     Subjective     Pt reports:    Rod reports that he continues to do well; Playing golf 4-5x week; Returns to [facility] on May 19th.    Pain Scale: Rod rates pain on a scale of 0-10 to be 1-2 currently.    Objective     Rod received individual therapeutic exercises to develop strength, ROM, posture and core stabilization for 50  minutes including:     Recumbent Bike today - Level 6 x 20 mins    Step-ups - onto green front and lateral - added 2 sets of purple risers x 3 mins   Step-Downs from front and backwards step up with right   Marching - knees up - flex to 90 x 1 min - added light green band resistance   Standing: Bilateral hip extension, abduction and flexion with blue Tband x 15 each   Side-Step - blue band x  2 1/2 mins  Heel raises x 20  H/S curls on Quantum - 50# x 30   Quantum - TKE - 50# x 30  Supine: hip flexion - knee lifts to 90 degrees x 10   Supine: SLR x 20  Supine: bridging x 20  Supine - ball squeezes x 3 mins - with pelvic tilts   S/L: clams x 15   S/L Hip abduction x 15   Quadraped : alternate arm and leg x  12    Written Home Exercises Provided:   Sit <> Stand; Squats; Arabesque position with toe taps on back leg; Psoas strengthening - marching with band around toes; Hip extension, abduction with band    Pt demo good understanding of the education provided. Rod demonstrated good return demonstration of activities.     Education provided re:  Current exercise routine is working well for him; walking; still watching hip precautions; playing golf.    No spiritual or  educational barriers to learning provided    Assessment     Patient tolerated treatment very well.  oRd has made excellent progress with his hip rehab; Expect discharge following upcoming MD appointment.   Right Hip AROM is WFL/WNL for 4 months sp right VLADIMIR; Strength is 4+/5 in hip girdle; 5/5 at knee; walking over [id number] steps per day, golfing, exercising, is progressing well.    This is a 75 y.o. male referred to outpatient physical therapy and presents with a medical diagnosis of Right VLADIMIR  and demonstrates limitations as described in the problem list. Pt prognosis is Good. Pt will continue to benefit from skilled outpatient physical therapy to address the deficits listed in the problem list, provide pt/family education and to maximize pt's level of independence in the home and community environment.     Goals as follows:    Long Term Goals: 12 weeks  Pain: Decrease pain to 0/10 to allow for improved ability to perform daily and recreational activities   Strength: Improve strength in bilateral Hip to 5/5 for improved LE stability  ROM: Improve ROM to functional limits in right hip - within any remaining hip precautions    Functional scale: Improve score on LEFS to <30% limitations   Lifting: Lift 20 lbs to waist level, and carry for 25 feet without pain or compensation  Walking: Increase walking distance/duration to 1/4 mile without pain  Postures: Increase sitting and/or standing duration to 30 mins without pain   Transfers: Perform sit <> Stand transfers without increased pain or limitation  Exercise: demonstrate independence with home exercise program to maintain gains made in therapy.       Plan     Continue with established Plan of Care towards PT goals.  Continue 2x/week x 2-3 weeks until MD appointment.      Therapist: Rose Ballesteros, PT  5/14/2020

## 2020-05-18 ENCOUNTER — CLINICAL SUPPORT (OUTPATIENT)
Dept: REHABILITATION | Facility: HOSPITAL | Age: 75
End: 2020-05-18
Payer: MEDICARE

## 2020-05-18 DIAGNOSIS — Z96.641 CHRONIC HIP PAIN AFTER TOTAL REPLACEMENT OF RIGHT HIP JOINT: Primary | ICD-10-CM

## 2020-05-18 DIAGNOSIS — G89.29 CHRONIC HIP PAIN AFTER TOTAL REPLACEMENT OF RIGHT HIP JOINT: Primary | ICD-10-CM

## 2020-05-18 DIAGNOSIS — R29.898 WEAKNESS OF RIGHT LOWER EXTREMITY: ICD-10-CM

## 2020-05-18 DIAGNOSIS — M25.551 CHRONIC HIP PAIN AFTER TOTAL REPLACEMENT OF RIGHT HIP JOINT: Primary | ICD-10-CM

## 2020-05-18 PROCEDURE — 97110 THERAPEUTIC EXERCISES: CPT | Mod: PN

## 2020-05-18 NOTE — PROGRESS NOTES
"                                   Physical Therapy Daily Note/ MD Progress Report      Name: Rod De La Paz  Clinic Number: [id number]  Diagnosis:   Encounter Diagnoses   Name Primary?    Chronic hip pain after total replacement of right hip joint Yes    Weakness of right lower extremity      Physician: Luther Fischer MD  Precautions: total hip precautions   Visit #: 24  PTA Visit #: 0  Time In:  2:30 PM   Time Out:   3:24 PM     Subjective     Pt reports:    Rod reports that he continues to do well; Only getting a little ache every now and then.  Playing golf 4-5x week; Returns to [facility] on May 19th.  - Wants to return to the gym and start some of his previous 'Core" exercises again. Using machines at the gym to perform these.   Pain Scale: Rod rates pain on a scale of 0-10 to be 1-2 currently.    Objective     Rod received individual therapeutic exercises to develop strength, ROM, posture and core stabilization for 50  minutes including:     Recumbent Bike today - Level 6 x 20 mins    Step-ups - onto green front and lateral - added 2 sets of purple risers x 3 mins   Step-Downs from front and backwards step up with right   Marching - knees up - flex to 90 x 1 min - added light green band resistance   Standing: Bilateral hip extension, abduction and flexion with blue Tband x 15 each   Side-Step - blue band x  2 1/2 mins  Heel raises x 20  H/S curls on Quantum - 50# x 30   Quantum - TKE - 50# x 30  Supine: hip flexion - knee lifts to 90 degrees x 10   Supine: SLR x 20  Supine: bridging x 20  Supine - ball squeezes x 3 mins - with pelvic tilts   S/L: clams x 15   S/L Hip abduction x 15   Quadraped : alternate arm and leg x  12    Written Home Exercises Provided:   Sit <> Stand; Squats; Arabesque position with toe taps on back leg; Psoas strengthening - marching with band around toes; Hip extension, abduction with band    Pt demo good understanding of the education provided. Rod demonstrated good return " demonstration of activities.     Education provided re:  Current exercise routine is working well for him; walking; still watching hip precautions; playing golf.    No spiritual or educational barriers to learning provided    Assessment     Patient tolerated treatment very well.  Rod has made excellent progress with his hip rehab; Expect discharge following upcoming MD appointment.   Right Hip AROM is WFL/WNL for 4 months sp right VLADIMIR; Strength is 4+/5 in hip girdle; 5/5 at knee; walking over 7,000 steps per day, golfing, exercising, is progressing well.    This is a 75 y.o. male referred to outpatient physical therapy and presents with a medical diagnosis of Right VLADIMIR  and demonstrates limitations as described in the problem list. Pt prognosis is Good. Pt will continue to benefit from skilled outpatient physical therapy to address the deficits listed in the problem list, provide pt/family education and to maximize pt's level of independence in the home and community environment.     Goals as follows:    Long Term Goals: 12 weeks  Pain: Decrease pain to 0/10 to allow for improved ability to perform daily and recreational activities   Strength: Improve strength in bilateral Hip to 5/5 for improved LE stability  ROM: Improve ROM to functional limits in right hip - within any remaining hip precautions    Functional scale: Improve score on LEFS to <30% limitations   Lifting: Lift 20 lbs to waist level, and carry for 25 feet without pain or compensation  Walking: Increase walking distance/duration to 1/4 mile without pain  Postures: Increase sitting and/or standing duration to 30 mins without pain   Transfers: Perform sit <> Stand transfers without increased pain or limitation  Exercise: demonstrate independence with home exercise program to maintain gains made in therapy.       Plan     Continue with established Plan of Care towards PT goals.  Continue 2x/week x 2-3 weeks than discharge to [facility].      Therapist:  Rose Ballesteros, PT  5/18/2020

## 2020-05-21 ENCOUNTER — CLINICAL SUPPORT (OUTPATIENT)
Dept: REHABILITATION | Facility: HOSPITAL | Age: 75
End: 2020-05-21
Payer: MEDICARE

## 2020-05-21 DIAGNOSIS — G89.29 CHRONIC HIP PAIN AFTER TOTAL REPLACEMENT OF RIGHT HIP JOINT: ICD-10-CM

## 2020-05-21 DIAGNOSIS — Z96.641 CHRONIC HIP PAIN AFTER TOTAL REPLACEMENT OF RIGHT HIP JOINT: ICD-10-CM

## 2020-05-21 DIAGNOSIS — R29.898 WEAKNESS OF RIGHT LOWER EXTREMITY: Primary | ICD-10-CM

## 2020-05-21 DIAGNOSIS — M25.551 CHRONIC HIP PAIN AFTER TOTAL REPLACEMENT OF RIGHT HIP JOINT: ICD-10-CM

## 2020-05-21 PROCEDURE — 97110 THERAPEUTIC EXERCISES: CPT | Mod: PN

## 2020-05-21 NOTE — PROGRESS NOTES
Physical Therapy Daily Note/ MD Progress Report      Name: Rod De La Paz  Clinic Number: [id number]  Diagnosis:   Encounter Diagnoses   Name Primary?    Weakness of right lower extremity Yes    Chronic hip pain after total replacement of right hip joint      Physician: Luther Fischer MD  Precautions: total hip precautions   Visit #: 25  PTA Visit #: 0  Time In:  9:00 AM    Time Out:  10:00 AM     Subjective     Pt reports:    Rod reports that he continues to do well; Saw Dr. Fischer on Tuesday - to return in 1 year; told to maintain hip precautions x 6 months; cleared for all activities.    Pain Scale: Rod rates pain on a scale of 0-10 to be 1-2 currently.    Objective     Rod received individual therapeutic exercises to develop strength, ROM, posture and core stabilization for 50  minutes including:     Recumbent Bike today - Level 6 x 20 mins    Step-ups - onto green front and lateral - added 2 sets of purple risers x 3 mins   Step-Downs from front and backwards step up with right   Marching - knees up - flex to 90 x 1 min - added yellow  band resistance   Standing: Bilateral hip extension, abduction and flexion with blue Tband x 15 each   Bosu ball - standing - mini squats   Side-Step - blue band x  2 1/2 mins  Heel raises x 20  H/S curls on Quantum - 50# x 30   Quantum - TKE - 50# x 30  Supine: hip flexion - knee lifts to 90 degrees x 10   Supine: SLR x 20  Supine: bridging x 20  Supine - ball squeezes x 3 mins - with pelvic tilts   S/L: clams x 15   S/L Hip abduction x 15   Quadraped : alternate arm and leg x  12    Written Home Exercises Provided:   Sit <> Stand; Squats; Arabesque position with toe taps on back leg; Psoas strengthening - marching with band around toes; Hip extension, abduction with band    Pt demo good understanding of the education provided. Rod demonstrated good return demonstration of activities.     Education provided re:  Current exercise routine  is working well for him; walking; still watching hip precautions; playing golf.    No spiritual or educational barriers to learning provided    Assessment     Patient tolerated treatment very well.  Rod has made excellent progress with his hip rehab; Expect discharge following upcoming MD appointment.   Right Hip AROM is WFL/WNL for 4 months sp right VLADIMIR; Strength is 4+/5 in hip girdle; 5/5 at knee; walking over 7,000 steps per day, golfing, exercising, is progressing well.    This is a 75 y.o. male referred to outpatient physical therapy and presents with a medical diagnosis of Right VLADIMIR  and demonstrates limitations as described in the problem list. Pt prognosis is Good. Pt will continue to benefit from skilled outpatient physical therapy to address the deficits listed in the problem list, provide pt/family education and to maximize pt's level of independence in the home and community environment.     Goals as follows:    Long Term Goals: 12 weeks  Pain: Decrease pain to 0/10 to allow for improved ability to perform daily and recreational activities   Strength: Improve strength in bilateral Hip to 5/5 for improved LE stability  ROM: Improve ROM to functional limits in right hip - within any remaining hip precautions    Functional scale: Improve score on LEFS to <30% limitations   Lifting: Lift 20 lbs to waist level, and carry for 25 feet without pain or compensation  Walking: Increase walking distance/duration to 1/4 mile without pain  Postures: Increase sitting and/or standing duration to 30 mins without pain   Transfers: Perform sit <> Stand transfers without increased pain or limitation  Exercise: demonstrate independence with home exercise program to maintain gains made in therapy.       Plan     Continue with established Plan of Care towards PT goals.  Continue 2x/week x 2-3 weeks than discharge to [facility].      Therapist: Rose Ballesteros, PT  5/21/2020

## 2020-05-25 ENCOUNTER — CLINICAL SUPPORT (OUTPATIENT)
Dept: REHABILITATION | Facility: HOSPITAL | Age: 75
End: 2020-05-25
Payer: MEDICARE

## 2020-05-25 DIAGNOSIS — G89.29 CHRONIC HIP PAIN AFTER TOTAL REPLACEMENT OF RIGHT HIP JOINT: Primary | ICD-10-CM

## 2020-05-25 DIAGNOSIS — M25.551 CHRONIC HIP PAIN AFTER TOTAL REPLACEMENT OF RIGHT HIP JOINT: Primary | ICD-10-CM

## 2020-05-25 DIAGNOSIS — R29.898 WEAKNESS OF RIGHT LOWER EXTREMITY: ICD-10-CM

## 2020-05-25 DIAGNOSIS — Z96.641 CHRONIC HIP PAIN AFTER TOTAL REPLACEMENT OF RIGHT HIP JOINT: Primary | ICD-10-CM

## 2020-05-25 PROCEDURE — 97110 THERAPEUTIC EXERCISES: CPT | Mod: PN

## 2020-05-25 NOTE — PROGRESS NOTES
Physical Therapy Daily Note/ MD Progress Report      Name: Rod DeL a Paz  Clinic Number: [id number]  Diagnosis:   Encounter Diagnoses   Name Primary?    Chronic hip pain after total replacement of right hip joint Yes    Weakness of right lower extremity      Physician: Luther Fischer MD  Precautions: total hip precautions   Visit #: 26  PTA Visit #: 0  Time In:  9:00 AM    Time Out:  10:00 AM     Subjective     Pt reports:    Rod reports that he continues to do well; Playing golf, has returned to most of his usual activities.    Pain Scale: Rod rates pain on a scale of 0-10 to be 1-2 currently.    Objective     Rod received individual therapeutic exercises to develop strength, ROM, posture and core stabilization for 50  minutes including:     Recumbent Bike today - Level 6 x 20 mins    Step-ups - onto green front and lateral - added 2 sets of purple risers x 3 mins   Step-Downs from front and backwards step up with right   Marching - knees up - flex to 90 x 1 min - added yellow  band resistance   Standing: Bilateral hip extension, abduction and flexion with blue Tband x 15 each   Bosu ball - standing - mini squats   Side-Step - blue band x  2 1/2 mins  Heel raises x 20  H/S curls on Quantum - 50# x 30   Quantum - TKE - 50# x 30  Supine: hip flexion - knee lifts to 90 degrees x 10   Supine: SLR x 20  Supine: bridging x 20  Supine - ball squeezes x 3 mins - with pelvic tilts   S/L: clams x 15 - black (heavy) band   S/L Hip abduction x 15   Quadraped : alternate arm and leg x  12    Written Home Exercises Provided:   Sit <> Stand; Squats; Arabesque position with toe taps on back leg; Psoas strengthening - marching with band around toes; Hip extension, abduction with band    Pt demo good understanding of the education provided. Rod demonstrated good return demonstration of activities.     Education provided re:  Current exercise routine is working well for him; walking; still  watching hip precautions; playing golf.    No spiritual or educational barriers to learning provided    Assessment     Patient tolerated treatment very well.  Rod has made excellent progress with his hip rehab;  Right Hip AROM is WFL/WNL for 4 months sp right VLADIMIR; Strength is 4+/5 in hip girdle; 5/5 at knee; walking over 7,000 steps per day, golfing, exercising, is progressing well.    This is a 75 y.o. male referred to outpatient physical therapy and presents with a medical diagnosis of Right VLADIMIR  and demonstrates limitations as described in the problem list. Pt prognosis is Good. Pt will continue to benefit from skilled outpatient physical therapy to address the deficits listed in the problem list, provide pt/family education and to maximize pt's level of independence in the home and community environment.     Goals as follows:    Long Term Goals: 12 weeks  Pain: Decrease pain to 0/10 to allow for improved ability to perform daily and recreational activities   Strength: Improve strength in bilateral Hip to 5/5 for improved LE stability  ROM: Improve ROM to functional limits in right hip - within any remaining hip precautions    Functional scale: Improve score on LEFS to <30% limitations   Lifting: Lift 20 lbs to waist level, and carry for 25 feet without pain or compensation  Walking: Increase walking distance/duration to 1/4 mile without pain  Postures: Increase sitting and/or standing duration to 30 mins without pain   Transfers: Perform sit <> Stand transfers without increased pain or limitation  Exercise: demonstrate independence with home exercise program to maintain gains made in therapy.       Plan     Continue with established Plan of Care towards PT goals.  Continue 2x/week x 2-3 weeks than discharge to [facility].      Therapist: Rose Ballesteros, PT  5/25/2020

## 2020-05-28 ENCOUNTER — CLINICAL SUPPORT (OUTPATIENT)
Dept: REHABILITATION | Facility: HOSPITAL | Age: 75
End: 2020-05-28
Payer: MEDICARE

## 2020-05-28 DIAGNOSIS — G89.29 CHRONIC HIP PAIN AFTER TOTAL REPLACEMENT OF RIGHT HIP JOINT: ICD-10-CM

## 2020-05-28 DIAGNOSIS — R29.898 WEAKNESS OF RIGHT LOWER EXTREMITY: Primary | ICD-10-CM

## 2020-05-28 DIAGNOSIS — M25.551 CHRONIC HIP PAIN AFTER TOTAL REPLACEMENT OF RIGHT HIP JOINT: ICD-10-CM

## 2020-05-28 DIAGNOSIS — Z96.641 CHRONIC HIP PAIN AFTER TOTAL REPLACEMENT OF RIGHT HIP JOINT: ICD-10-CM

## 2020-05-28 PROCEDURE — 97110 THERAPEUTIC EXERCISES: CPT | Mod: PN

## 2020-05-28 NOTE — PROGRESS NOTES
Physical Therapy Daily Note/ MD Progress Report      Name: Rod De La Paz  Clinic Number: [id number]  Diagnosis:   Encounter Diagnoses   Name Primary?    Chronic hip pain after total replacement of right hip joint     Weakness of right lower extremity Yes     Physician: Luther Fischer MD  Precautions: total hip precautions   Visit #: 27  PTA Visit #: 0  Time In:  9:00 AM    Time Out:  9:45 AM      Subjective     Pt reports:    oRd reports that he continues to do well; Playing golf, has returned to most of his usual activities.    Pain Scale: Rod rates pain on a scale of 0-10 to be 1-2 currently.    Objective     Rod received individual therapeutic exercises to develop strength, ROM, posture and core stabilization for 50  minutes including:     Recumbent Bike today - Level 6 x 20 mins    Step-ups - onto green front and lateral - added 2 sets of purple risers x 3 mins   Step-Downs from front and backwards step up with right   Marching - knees up - flex to 90 x 1 min - added yellow  band resistance   Standing: Bilateral hip extension, abduction and flexion with blue Tband x 15 each   Bosu ball - standing - mini squats   Side-Step - blue band x  2 1/2 mins  Heel raises x 20  H/S curls on Quantum - 50# x 30   Quantum - TKE - 50# x 30  Supine: hip flexion - knee lifts to 90 degrees x 10   Supine: SLR x 20  Supine: bridging x 20  Supine - ball squeezes x 3 mins - with pelvic tilts   S/L: clams x 15 - black (heavy) band   S/L Hip abduction x 15   Quadraped : alternate arm and leg x  12    Written Home Exercises Provided:   Sit <> Stand; Squats; Arabesque position with toe taps on back leg; Psoas strengthening - marching with band around toes; Hip extension, abduction with band    Pt demo good understanding of the education provided. Rod demonstrated good return demonstration of activities.     Education provided re:  Current exercise routine is working well for him; walking; still  watching hip precautions; playing golf.    No spiritual or educational barriers to learning provided    Assessment     Patient tolerated treatment very well.  Rod has made excellent progress with his hip rehab;  Right Hip AROM is WFL/WNL for 4 months sp right VLADIMIR; Strength is 4+/5 in hip girdle; 5/5 at knee; walking over 7,000 steps per day, golfing, exercising, is progressing well.    This is a 75 y.o. male referred to outpatient physical therapy and presents with a medical diagnosis of Right VLADIMIR  and demonstrates limitations as described in the problem list. Pt prognosis is Good. Pt will continue to benefit from skilled outpatient physical therapy to address the deficits listed in the problem list, provide pt/family education and to maximize pt's level of independence in the home and community environment.     Goals as follows:    Long Term Goals: 12 weeks  Pain: Decrease pain to 0/10 to allow for improved ability to perform daily and recreational activities   Strength: Improve strength in bilateral Hip to 5/5 for improved LE stability  ROM: Improve ROM to functional limits in right hip - within any remaining hip precautions    Functional scale: Improve score on LEFS to <30% limitations   Lifting: Lift 20 lbs to waist level, and carry for 25 feet without pain or compensation  Walking: Increase walking distance/duration to 1/4 mile without pain  Postures: Increase sitting and/or standing duration to 30 mins without pain   Transfers: Perform sit <> Stand transfers without increased pain or limitation  Exercise: demonstrate independence with home exercise program to maintain gains made in therapy.       Plan     Continue with established Plan of Care towards PT goals.      Therapist: Rose Ballesteros, PT  5/28/2020

## 2020-06-01 ENCOUNTER — CLINICAL SUPPORT (OUTPATIENT)
Dept: REHABILITATION | Facility: HOSPITAL | Age: 75
End: 2020-06-01
Payer: MEDICARE

## 2020-06-01 DIAGNOSIS — M25.551 CHRONIC HIP PAIN AFTER TOTAL REPLACEMENT OF RIGHT HIP JOINT: ICD-10-CM

## 2020-06-01 DIAGNOSIS — Z96.641 CHRONIC HIP PAIN AFTER TOTAL REPLACEMENT OF RIGHT HIP JOINT: ICD-10-CM

## 2020-06-01 DIAGNOSIS — R29.898 WEAKNESS OF RIGHT LOWER EXTREMITY: Primary | ICD-10-CM

## 2020-06-01 DIAGNOSIS — G89.29 CHRONIC HIP PAIN AFTER TOTAL REPLACEMENT OF RIGHT HIP JOINT: ICD-10-CM

## 2020-06-01 PROCEDURE — 97110 THERAPEUTIC EXERCISES: CPT | Mod: PN

## 2020-06-01 NOTE — PROGRESS NOTES
Physical Therapy Daily Note/ MD Progress Report      Name: Rod De La Paz  Clinic Number: [id number]  Diagnosis:   Encounter Diagnoses   Name Primary?    Weakness of right lower extremity Yes    Chronic hip pain after total replacement of right hip joint      Physician: Luther Fischer MD  Precautions: total hip precautions   Visit #: 28  PTA Visit #: 0  Time In:  8:30  AM    Time Out:  9:45 AM      Subjective     Pt reports:    Rod reports that he continues to do well; Playing golf, has returned to most of his usual activities.  Will be heading down to [facility] next week.   Pain Scale: Rod rates pain on a scale of 0-10 to be 1-2 currently.    Objective     Rdo received individual therapeutic exercises to develop strength, ROM, posture and core stabilization for 50  minutes including:     Recumbent Bike today - Level 6 x 20 mins    Step-ups - onto green front and lateral - added 2 sets of purple risers x 3 mins   Step-Downs from front and backwards step up with right   Marching - knees up - flex to 90 x 1 min - added yellow  band resistance   Standing: Bilateral hip extension, abduction and flexion with blue Tband x 15 each   Bosu ball - standing - mini squats   Side-Step - blue band x  2 1/2 mins  Heel raises x 20  H/S curls on Quantum - 50# x 30   Quantum - TKE - 50# x 30  Supine: hip flexion - knee lifts to 90 degrees x 10   Supine: SLR x 20  Supine: bridging x 20  Supine - ball squeezes x 3 mins - with pelvic tilts   S/L: clams x 15 - black (heavy) band   S/L Hip abduction x 15   Quadraped : alternate arm and leg x  12    Written Home Exercises Provided:   Sit <> Stand; Squats; Arabesque position with toe taps on back leg; Psoas strengthening - marching with band around toes; Hip extension, abduction with band    Pt demo good understanding of the education provided. Rod demonstrated good return demonstration of activities.     Education provided re:  Current  exercise routine is working well for him; walking; still watching hip precautions; playing golf.    No spiritual or educational barriers to learning provided    Assessment     Patient tolerated treatment very well. Still walks with a slight limp - decreased gluteal mm activation on push-off on Right;  Rod has made excellent progress with his hip rehab;  Right Hip AROM is WFL/WNL for 4 months sp right VLADIMIR; Strength is 4+/5 in hip girdle; 5/5 at knee; walking over 7,000 steps per day, golfing, exercising, is progressing well.    This is a 75 y.o. male referred to outpatient physical therapy and presents with a medical diagnosis of Right VLADIMIR  and demonstrates limitations as described in the problem list. Pt prognosis is Good. Pt will continue to benefit from skilled outpatient physical therapy to address the deficits listed in the problem list, provide pt/family education and to maximize pt's level of independence in the home and community environment.     Goals as follows:    Long Term Goals: 12 weeks  Pain: Decrease pain to 0/10 to allow for improved ability to perform daily and recreational activities   Strength: Improve strength in bilateral Hip to 5/5 for improved LE stability  ROM: Improve ROM to functional limits in right hip - within any remaining hip precautions    Functional scale: Improve score on LEFS to <30% limitations   Lifting: Lift 20 lbs to waist level, and carry for 25 feet without pain or compensation  Walking: Increase walking distance/duration to 1/4 mile without pain  Postures: Increase sitting and/or standing duration to 30 mins without pain   Transfers: Perform sit <> Stand transfers without increased pain or limitation  Exercise: demonstrate independence with home exercise program to maintain gains made in therapy.       Plan     Continue with established Plan of Care towards PT goals.      Therapist: Rose Ballesteros, PT  6/1/2020

## 2020-06-04 ENCOUNTER — CLINICAL SUPPORT (OUTPATIENT)
Dept: REHABILITATION | Facility: HOSPITAL | Age: 75
End: 2020-06-04
Payer: MEDICARE

## 2020-06-04 DIAGNOSIS — R29.898 WEAKNESS OF RIGHT LOWER EXTREMITY: ICD-10-CM

## 2020-06-04 DIAGNOSIS — M25.551 CHRONIC HIP PAIN AFTER TOTAL REPLACEMENT OF RIGHT HIP JOINT: Primary | ICD-10-CM

## 2020-06-04 DIAGNOSIS — Z96.641 CHRONIC HIP PAIN AFTER TOTAL REPLACEMENT OF RIGHT HIP JOINT: Primary | ICD-10-CM

## 2020-06-04 DIAGNOSIS — G89.29 CHRONIC HIP PAIN AFTER TOTAL REPLACEMENT OF RIGHT HIP JOINT: Primary | ICD-10-CM

## 2020-06-04 PROCEDURE — 97110 THERAPEUTIC EXERCISES: CPT | Mod: PN

## 2020-06-04 NOTE — PROGRESS NOTES
Physical Therapy Daily Note/ MD Progress Report      Name: Rod De La Paz  Clinic Number: [id number]  Diagnosis:   Encounter Diagnoses   Name Primary?    Chronic hip pain after total replacement of right hip joint Yes    Weakness of right lower extremity      Physician: Luther Fischer MD  Precautions: total hip precautions   Visit #: 29  PTA Visit #: 0  Time In:  8: 20 AM    Time Out:  9:40 am      Subjective     Pt reports:    Rod reports that he continues to do well; Feels stronger on a daily basis; not getting as much achiness in his hip anymore;    Pain Scale: Rod rates pain on a scale of 0-10 to be 1- currently.    Objective     Rod received individual therapeutic exercises to develop strength, ROM, posture and core stabilization for 50  minutes including:     Recumbent Bike today - Level 6 x 20 mins    Step-ups - onto green front and lateral - added 2 sets of purple risers x 3 mins   Step-Downs from front and backwards step up with right   Marching - knees up - flex to 90 x 1 min - added yellow  band resistance   Standing: Bilateral hip extension, abduction and flexion with blue Tband x 15 each   Bosu ball - standing - mini squats   Side-Step - blue band x  2 1/2 mins  Heel raises x 20  H/S curls on Quantum - 50# x 30   Quantum - TKE - 50# x 30  Supine: hip flexion - knee lifts to 90 degrees x 10   Supine: SLR x 20  Supine: bridging x 20  Supine - ball squeezes x 3 mins - with pelvic tilts   S/L: clams x 15 - black (heavy) band   S/L Hip abduction x 15   Quadraped : alternate arm and leg x  12  Wall squats with SB  - cueing for more hip hinging     Written Home Exercises Provided:   Sit <> Stand; Squats; Arabesque position with toe taps on back leg; Psoas strengthening - marching with band around toes; Hip extension, abduction with band    Pt demo good understanding of the education provided. Rod demonstrated good return demonstration of activities.     Education  provided re:  Current exercise routine is working well for him; walking; still watching hip precautions; playing golf.    No spiritual or educational barriers to learning provided    Assessment     Patient tolerated treatment very well. Gait is much better, no longer walking with limp; able to successfully negotiate all surfaces;   Rod has made excellent progress with his hip rehab;  Right Hip AROM is WFL/WNL for 4 months sp right VLADIMIR; Strength is 4+/5 in hip girdle; 5/5 at knee; walking over 7,000 steps per day, golfing, exercising, is progressing well.    This is a 75 y.o. male referred to outpatient physical therapy and presents with a medical diagnosis of Right VLADIMIR  and demonstrates limitations as described in the problem list. Pt prognosis is Good. Pt will continue to benefit from skilled outpatient physical therapy to address the deficits listed in the problem list, provide pt/family education and to maximize pt's level of independence in the home and community environment.     Goals as follows:    Long Term Goals: 12 weeks  Pain: Decrease pain to 0/10 to allow for improved ability to perform daily and recreational activities   Strength: Improve strength in bilateral Hip to 5/5 for improved LE stability  ROM: Improve ROM to functional limits in right hip - within any remaining hip precautions    Functional scale: Improve score on LEFS to <30% limitations   Lifting: Lift 20 lbs to waist level, and carry for 25 feet without pain or compensation  Walking: Increase walking distance/duration to 1/4 mile without pain  Postures: Increase sitting and/or standing duration to 30 mins without pain   Transfers: Perform sit <> Stand transfers without increased pain or limitation  Exercise: demonstrate independence with home exercise program to maintain gains made in therapy.       Plan     Continue with established Plan of Care towards PT goals.  [place] next week; will finish-up his therapy upon return     Therapist:  Rose Ballesteros, PT  6/4/2020

## 2020-06-19 ENCOUNTER — CLINICAL SUPPORT (OUTPATIENT)
Dept: REHABILITATION | Facility: HOSPITAL | Age: 75
End: 2020-06-19
Payer: MEDICARE

## 2020-06-19 DIAGNOSIS — R29.898 WEAKNESS OF RIGHT LOWER EXTREMITY: Primary | ICD-10-CM

## 2020-06-19 DIAGNOSIS — M25.551 CHRONIC HIP PAIN AFTER TOTAL REPLACEMENT OF RIGHT HIP JOINT: ICD-10-CM

## 2020-06-19 DIAGNOSIS — Z96.641 CHRONIC HIP PAIN AFTER TOTAL REPLACEMENT OF RIGHT HIP JOINT: ICD-10-CM

## 2020-06-19 DIAGNOSIS — G89.29 CHRONIC HIP PAIN AFTER TOTAL REPLACEMENT OF RIGHT HIP JOINT: ICD-10-CM

## 2020-06-19 PROCEDURE — 97110 THERAPEUTIC EXERCISES: CPT | Mod: PN

## 2020-06-19 NOTE — PROGRESS NOTES
Physical Therapy Daily Note/ MD Progress Report      Name: Rod De La Paz  Clinic Number: [id number]  Diagnosis:   Encounter Diagnoses   Name Primary?    Weakness of right lower extremity Yes    Chronic hip pain after total replacement of right hip joint      Physician: Luther Fischer MD  Precautions: total hip precautions   Visit #: 30  PTA Visit #: 0  Time In:  8: 20 am    Time Out:  9:15 am     Subjective     Pt reports:    Rod reports that he had no issues while in [place] last week; able to negotiate airport, luggage, plane without any increase in symptoms   Pain Scale: Rod rates pain on a scale of 0-10 to be 1- currently.    Objective     Rod received individual therapeutic exercises to develop strength, ROM, posture and core stabilization for 50  minutes including:     Recumbent Bike today - Level 6 x 20 mins    Step-ups - onto green front and lateral - added 2 sets of purple risers x    Step-Downs from front and backwards step up with right   Marching - knees up - flex to 90 x 1 min - added yellow  band resistance   Standing: Bilateral hip extension, abduction and flexion with blue Tband x 15 each   Bosu ball - standing - mini squats   Side-Step - blue band x  2 1/2 mins  Heel raises x 20  H/S curls on Quantum - 50# x 30   Quantum - TKE - 50# x 30  Supine: hip flexion - knee lifts to 90 degrees x 10   Supine: SLR x 20  Supine: bridging x 20  Supine - ball squeezes x 3 mins - with pelvic tilts   S/L: clams x 15 - black (heavy) band   S/L Hip abduction x 15   Quadraped : alternate arm and leg x  12  Wall squats with SB  - cueing for more hip hinging     Written Home Exercises Provided:   Sit <> Stand; Squats; Arabesque position with toe taps on back leg; Psoas strengthening - marching with band around toes; Hip extension, abduction with band    Pt demo good understanding of the education provided. Rod demonstrated good return demonstration of activities.     Education  provided re:  Current exercise routine is working well for him; walking; still watching hip precautions; playing golf.    No spiritual or educational barriers to learning provided    Assessment     Patient tolerated treatment very well. Gait is much better, no longer walking with limp; able to successfully negotiate all surfaces;   Rod has made excellent progress with his hip rehab;  Right Hip AROM is WFL/WNL for 4 months sp right VLADIMIR; Strength is 4+/5 in hip girdle; 5/5 at knee; walking over 7,000 steps per day, golfing, exercising, is progressing well.    This is a 75 y.o. male referred to outpatient physical therapy and presents with a medical diagnosis of Right VLADIMIR  and demonstrates limitations as described in the problem list. Pt prognosis is Good. Pt will continue to benefit from skilled outpatient physical therapy to address the deficits listed in the problem list, provide pt/family education and to maximize pt's level of independence in the home and community environment.     Goals as follows:    Long Term Goals: 12 weeks  Pain: Decrease pain to 0/10 to allow for improved ability to perform daily and recreational activities   Strength: Improve strength in bilateral Hip to 5/5 for improved LE stability  ROM: Improve ROM to functional limits in right hip - within any remaining hip precautions    Functional scale: Improve score on LEFS to <30% limitations   Lifting: Lift 20 lbs to waist level, and carry for 25 feet without pain or compensation  Walking: Increase walking distance/duration to 1/4 mile without pain  Postures: Increase sitting and/or standing duration to 30 mins without pain   Transfers: Perform sit <> Stand transfers without increased pain or limitation  Exercise: demonstrate independence with home exercise program to maintain gains made in therapy.       Plan     Continue with established Plan of Care towards PT goals.  [place] next week; will finish-up his therapy upon return     Therapist:  Rose Ballesteros, PT  6/19/2020

## 2020-06-22 ENCOUNTER — CLINICAL SUPPORT (OUTPATIENT)
Dept: REHABILITATION | Facility: HOSPITAL | Age: 75
End: 2020-06-22
Payer: MEDICARE

## 2020-06-22 DIAGNOSIS — M25.551 CHRONIC HIP PAIN AFTER TOTAL REPLACEMENT OF RIGHT HIP JOINT: Primary | ICD-10-CM

## 2020-06-22 DIAGNOSIS — G89.29 CHRONIC HIP PAIN AFTER TOTAL REPLACEMENT OF RIGHT HIP JOINT: Primary | ICD-10-CM

## 2020-06-22 DIAGNOSIS — Z96.641 CHRONIC HIP PAIN AFTER TOTAL REPLACEMENT OF RIGHT HIP JOINT: Primary | ICD-10-CM

## 2020-06-22 DIAGNOSIS — R29.898 WEAKNESS OF RIGHT LOWER EXTREMITY: ICD-10-CM

## 2020-06-22 PROCEDURE — 97110 THERAPEUTIC EXERCISES: CPT | Mod: PN

## 2020-06-22 NOTE — PROGRESS NOTES
Physical Therapy Daily Note/ MD Progress Report      Name: Rdo De La Paz  Clinic Number: [id number]  Diagnosis:   Encounter Diagnoses   Name Primary?    Chronic hip pain after total replacement of right hip joint Yes    Weakness of right lower extremity      Physician: Luther Fischer MD  Precautions: total hip precautions   Visit #: 31  PTA Visit #: 0  Time In:  8: 50 am    Time Out:  10:00 am    Subjective     Pt reports:   No new issues to report.   Pain Scale: Rod rates pain on a scale of 0-10 to be 1- currently.    Objective     Rod received individual therapeutic exercises to develop strength, ROM, posture and core stabilization for 50  minutes including:     Recumbent Bike today - Level 6 x 20 mins    Step-ups - onto green front and lateral - added 2 sets of purple risers x    Step-Downs from front and backwards step up with right   Marching - knees up - flex to 90 x 1 min - added yellow  band resistance   Standing: Bilateral hip extension, abduction and flexion with blue Tband x 15 each   Bosu ball - standing - mini squats   Side-Step - blue band x  2 1/2 mins  Heel raises x 20  H/S curls on Quantum - 50# x 30   Quantum - TKE - 50# x 30  Supine: hip flexion - knee lifts to 90 degrees x 10   Supine: SLR x 20  Supine: bridging x 20  Supine - ball squeezes x 3 mins - with pelvic tilts   S/L: clams x 15 - black (heavy) band   S/L Hip abduction x 15   Quadraped : alternate arm and leg x  12  Wall squats with SB  - cueing for more hip hinging     Written Home Exercises Provided:   Sit <> Stand; Squats; Arabesque position with toe taps on back leg; Psoas strengthening - marching with band around toes; Hip extension, abduction with band    Pt demo good understanding of the education provided. Rod demonstrated good return demonstration of activities.     Education provided re:  Current exercise routine is working well for him; walking; still watching hip precautions; playing  golf.    No spiritual or educational barriers to learning provided    Assessment     Patient tolerated treatment very well.  He is continuing to demonstrate increased strength and mobility.    Rod has made excellent progress with his hip rehab;  Right Hip AROM is WFL/WNL for 4 months sp right VLADIMIR; Strength is 4+/5 in hip girdle; 5/5 at knee; walking over 7,000 steps per day, golfing, exercising, is progressing well.    This is a 75 y.o. male referred to outpatient physical therapy and presents with a medical diagnosis of Right VLADIMIR  and demonstrates limitations as described in the problem list. Pt prognosis is Good. Pt will continue to benefit from skilled outpatient physical therapy to address the deficits listed in the problem list, provide pt/family education and to maximize pt's level of independence in the home and community environment.     Goals as follows:    Long Term Goals: 12 weeks  Pain: Decrease pain to 0/10 to allow for improved ability to perform daily and recreational activities   Strength: Improve strength in bilateral Hip to 5/5 for improved LE stability  ROM: Improve ROM to functional limits in right hip - within any remaining hip precautions    Functional scale: Improve score on LEFS to <30% limitations   Lifting: Lift 20 lbs to waist level, and carry for 25 feet without pain or compensation  Walking: Increase walking distance/duration to 1/4 mile without pain  Postures: Increase sitting and/or standing duration to 30 mins without pain   Transfers: Perform sit <> Stand transfers without increased pain or limitation  Exercise: demonstrate independence with home exercise program to maintain gains made in therapy.       Plan     Continue with established Plan of Care towards PT goals.      Therapist: Rose Ballesteros, PT  6/22/2020

## 2020-06-29 ENCOUNTER — CLINICAL SUPPORT (OUTPATIENT)
Dept: REHABILITATION | Facility: HOSPITAL | Age: 75
End: 2020-06-29
Payer: MEDICARE

## 2020-06-29 DIAGNOSIS — M25.551 CHRONIC HIP PAIN AFTER TOTAL REPLACEMENT OF RIGHT HIP JOINT: Primary | ICD-10-CM

## 2020-06-29 DIAGNOSIS — G89.29 CHRONIC HIP PAIN AFTER TOTAL REPLACEMENT OF RIGHT HIP JOINT: Primary | ICD-10-CM

## 2020-06-29 DIAGNOSIS — R29.898 WEAKNESS OF RIGHT LOWER EXTREMITY: ICD-10-CM

## 2020-06-29 DIAGNOSIS — Z96.641 CHRONIC HIP PAIN AFTER TOTAL REPLACEMENT OF RIGHT HIP JOINT: Primary | ICD-10-CM

## 2020-06-29 PROCEDURE — 97110 THERAPEUTIC EXERCISES: CPT | Mod: PN

## 2020-06-29 NOTE — PROGRESS NOTES
Physical Therapy Daily Note/ MD Progress Report      Name: Rod De La Paz  Clinic Number: [id number]  Diagnosis:   Encounter Diagnoses   Name Primary?    Chronic hip pain after total replacement of right hip joint Yes    Weakness of right lower extremity      Physician: Luther Fischer MD  Precautions: total hip precautions   Visit #: 32  PTA Visit #: 0  Time In:  8: 25 am     Time Out:  9:30 am     Subjective     Pt reports:   No new issues to report.   Pain Scale: Rod rates pain on a scale of 0-10 to be 1- currently.    Objective     Rod received individual therapeutic exercises to develop strength, ROM, posture and core stabilization for 50  minutes including:     Recumbent Bike today - Level 6 x 20 mins    Step-ups - onto green front and lateral - added 2 sets of purple risers x    Step-Downs from front and backwards step up with right   Marching - knees up - flex to 90 x 1 min - added yellow  band resistance   Standing: Bilateral hip extension, abduction and flexion with blue Tband x 15 each   Bosu ball - standing - mini squats   Side-Step - blue band x  2 1/2 mins  Heel raises x 20  H/S curls on Quantum - 50# x 30   Quantum - TKE - 50# x 30  Supine: hip flexion - knee lifts to 90 degrees x 10   Supine: SLR x 20  Supine: bridging x 20  Supine - ball squeezes x 3 mins - with pelvic tilts   S/L: clams x 15 - black (heavy) band   S/L Hip abduction x 15   Quadraped : alternate arm and leg x  12  Wall squats with SB  - cueing for more hip hinging     Written Home Exercises Provided:   Sit <> Stand; Squats; Arabesque position with toe taps on back leg; Psoas strengthening - marching with band around toes; Hip extension, abduction with band    Pt demo good understanding of the education provided. Rod demonstrated good return demonstration of activities.     Education provided re:  Current exercise routine is working well for him; walking; still watching hip precautions; playing  golf.    No spiritual or educational barriers to learning provided    Assessment     Patient tolerated treatment very well.  He is continuing to demonstrate increased strength and mobility.    Rod has made excellent progress with his hip rehab;  Right Hip AROM is WFL/WNL for 4 months sp right VLADIMIR; Strength is 4+/5 in hip girdle; 5/5 at knee; walking over 7,000 steps per day, golfing, exercising, is progressing well.    This is a 75 y.o. male referred to outpatient physical therapy and presents with a medical diagnosis of Right VLADIMIR  and demonstrates limitations as described in the problem list. Pt prognosis is Good. Pt will continue to benefit from skilled outpatient physical therapy to address the deficits listed in the problem list, provide pt/family education and to maximize pt's level of independence in the home and community environment.     Goals as follows:    Long Term Goals: 12 weeks  Pain: Decrease pain to 0/10 to allow for improved ability to perform daily and recreational activities   Strength: Improve strength in bilateral Hip to 5/5 for improved LE stability  ROM: Improve ROM to functional limits in right hip - within any remaining hip precautions    Functional scale: Improve score on LEFS to <30% limitations   Lifting: Lift 20 lbs to waist level, and carry for 25 feet without pain or compensation  Walking: Increase walking distance/duration to 1/4 mile without pain  Postures: Increase sitting and/or standing duration to 30 mins without pain   Transfers: Perform sit <> Stand transfers without increased pain or limitation  Exercise: demonstrate independence with home exercise program to maintain gains made in therapy.       Plan     Continue with established Plan of Care towards PT goals.      Therapist: Rose Ballesteros, PT  6/29/2020

## 2020-07-01 ENCOUNTER — CLINICAL SUPPORT (OUTPATIENT)
Dept: REHABILITATION | Facility: HOSPITAL | Age: 75
End: 2020-07-01
Payer: MEDICARE

## 2020-07-01 DIAGNOSIS — R29.898 WEAKNESS OF RIGHT LOWER EXTREMITY: Primary | ICD-10-CM

## 2020-07-01 DIAGNOSIS — M25.551 CHRONIC HIP PAIN AFTER TOTAL REPLACEMENT OF RIGHT HIP JOINT: ICD-10-CM

## 2020-07-01 DIAGNOSIS — G89.29 CHRONIC HIP PAIN AFTER TOTAL REPLACEMENT OF RIGHT HIP JOINT: ICD-10-CM

## 2020-07-01 DIAGNOSIS — Z96.641 CHRONIC HIP PAIN AFTER TOTAL REPLACEMENT OF RIGHT HIP JOINT: ICD-10-CM

## 2020-07-01 PROCEDURE — 97110 THERAPEUTIC EXERCISES: CPT | Mod: PN

## 2020-07-01 NOTE — PROGRESS NOTES
Physical Therapy Daily Note/ MD Progress Report      Name: Rod De La Paz  Clinic Number: [id number]  Diagnosis:   Encounter Diagnoses   Name Primary?    Weakness of right lower extremity Yes    Chronic hip pain after total replacement of right hip joint      Physician: Luther Fischer MD  Precautions: total hip precautions   Visit #: 32  PTA Visit #: 0  Time In:  8: 15 am    Time Out:  9:03    Subjective     Pt reports:   No new issues to report. Last visit today.  Rod will be returning to the gym to continue with exercise.   Pain Scale: Rod rates pain on a scale of 0-10 to be 1- currently.    Objective     Rod received individual therapeutic exercises to develop strength, ROM, posture and core stabilization for 50  minutes including:     Recumbent Bike today - Level 6 x 20 mins      Added: Leg Press : 80# x 50 reps     Step-ups - onto green front and lateral - added 2 sets of purple risers x    Step-Downs from front and backwards step up with right   Marching - knees up - flex to 90 x 1 min - added yellow  band resistance   Standing: Bilateral hip extension, abduction and flexion with blue Tband x 15 each   Bosu ball - standing - mini squats   Side-Step - blue band x  2 1/2 mins  Heel raises x 20  H/S curls on Quantum - 50# x 30   Quantum - TKE - 50# x 30  Supine: hip flexion - knee lifts to 90 degrees x 10   Supine: SLR x 20  Supine: bridging x 20  Supine - ball squeezes x 3 mins - with pelvic tilts   S/L: clams x 15 - black (heavy) band   S/L Hip abduction x 15   Quadraped : alternate arm and leg x  12  Wall squats with SB  - cueing for more hip hinging     Written Home Exercises Provided:   Sit <> Stand; Squats; Arabesque position with toe taps on back leg; Psoas strengthening - marching with band around toes; Hip extension, abduction with band    Pt demo good understanding of the education provided. Rod demonstrated good return demonstration of activities.     Education  provided re:  Current exercise routine is working well for him; walking; still watching hip precautions; playing golf.    No spiritual or educational barriers to learning provided    Assessment     Patient tolerated treatment very well.  He is continuing to demonstrate increased strength and mobility.    Rod has made excellent progress with his hip rehab;  Right Hip AROM is WFL/WNL for 4 months sp right VLADIMIR; Strength is 5/5 in hip girdle; 5/5 at knee; walking over 7,000 steps per day, golfing, exercising, is progressing well.  Able to go up.down steps without any LOB or weakness in legs; has returned to his PLOF.   This is a 75 y.o. male referred to outpatient physical therapy and presents with a medical diagnosis of Right VLADIMIR  and demonstrates limitations as described in the problem list. Pt prognosis is Good. Pt will continue to benefit from skilled outpatient physical therapy to address the deficits listed in the problem list, provide pt/family education and to maximize pt's level of independence in the home and community environment.     Goals as follows:    Long Term Goals: 12 weeks - All goals achieved   Pain: Decrease pain to 0/10 to allow for improved ability to perform daily and recreational activities   Strength: Improve strength in bilateral Hip to 5/5 for improved LE stability  ROM: Improve ROM to functional limits in right hip - within any remaining hip precautions    Functional scale: Improve score on LEFS to <30% limitations   Lifting: Lift 20 lbs to waist level, and carry for 25 feet without pain or compensation  Walking: Increase walking distance/duration to 1/4 mile without pain  Postures: Increase sitting and/or standing duration to 30 mins without pain   Transfers: Perform sit <> Stand transfers without increased pain or limitation  Exercise: demonstrate independence with home exercise program to maintain gains made in therapy.       Plan     Patient has reached all goals, He is at his PLOF as  far as daily,community activity is concerned.  He feels he is ready for discharge and PT agrees.   Discharge from PT     Therapist: Rose Ballesteros, PT  7/1/2020

## 2022-12-08 DIAGNOSIS — M54.16 LEFT LUMBAR RADICULOPATHY: ICD-10-CM

## 2022-12-08 DIAGNOSIS — M51.16 INTERVERTEBRAL DISC DISORDER WITH RADICULOPATHY OF LUMBAR REGION: Primary | ICD-10-CM

## 2023-01-03 ENCOUNTER — CLINICAL SUPPORT (OUTPATIENT)
Dept: REHABILITATION | Facility: HOSPITAL | Age: 78
End: 2023-01-03
Payer: MEDICARE

## 2023-01-03 DIAGNOSIS — R26.89 IMPAIRED GAIT AND MOBILITY: ICD-10-CM

## 2023-01-03 DIAGNOSIS — M51.16 INTERVERTEBRAL DISC DISORDER WITH RADICULOPATHY OF LUMBAR REGION: ICD-10-CM

## 2023-01-03 DIAGNOSIS — M54.16 LEFT LUMBAR RADICULOPATHY: ICD-10-CM

## 2023-01-03 PROCEDURE — 97162 PT EVAL MOD COMPLEX 30 MIN: CPT | Mod: PN

## 2023-01-04 NOTE — PLAN OF CARE
"[facility]  Physical Therapy Initial Evaluation    Name: Rod De La Paz  Clinic Number: [id number]    Therapy Diagnosis:   Encounter Diagnoses   Name Primary?    Intervertebral disc disorder with radiculopathy of lumbar region     Left lumbar radiculopathy     Impaired gait and mobility      Physician: Alton Moeller    Physician Orders: PT Eval and Treat   Medical Diagnosis from Referral: Lumbar Radiculopathy - LLE pain   Evaluation Date: 1/3/2023  Authorization Period Expiration: 12/31/2023  Plan of Care Expiration: 3/31/2023  Visit # / Visits authorized: 1/ 1  FOTO Visit #:  1/3    Time In: 8:30 am   Time Out: 9:20 am   Total Appointment Time (timed & untimed codes): 45 minutes    Precautions: Standard and cancer    Subjective   Date of onset: June 2022  History of current condition - Rod reports: long history of lower back pain, but had started to have left hip and thigh pain sometime with summer.  He stated that when he had his right VLADIMIR in 2020, he was told that he had some OA in left hip as well - feels that his left leg pain is probably coming from this.  Rod indicated that he was experiencing a shocking type pain in his left thigh when he was performing standing activities - like golfing. He would attempt to transfer his weight from right to left to perform his golf swing and feel like his knee would buckle.  He is aware that he has neuropathy in his LE's as well as stenosis in his lumbar spine, but this felt different to him.  Back in the summer of 2022, he went to see pain management and was started on gabapentin and tizanidine - this has improved his LE pain significantly.  He no longer gets the "shocking" pain down his leg, but does have discomfort in left anterolateral and medial thigh with prolonged standing, walking and squat type activities.   Rod stated that he was seeing a  2x/week for the past couple of years. Work outs were comprehensive in " nature and seemed to address all aspects of his body.  He stopped the training before [place] as they were heading out of town, but also because he is going to be starting radiation treatment for some suspicious areas that could be related to his prostate CA.  He is also going for Lumbar RAYMOND to L4/L5 tomorrow.      Medical History:   Prostate CA dx in 2017 with prostatectomy; Right VLADIMIR January 2020, Left hip OA; Chronic Lumbar stenosis with multi-level lumbar degenerative disc disease and facet arthrosis.     Surgical History:   Rod De La Paz has had prostatectomy 2017 and Right VLADIMIR January 2020     Medications:  Gabapentin 300 mg 2x/day Doxycycline 20 mg oral tablet; pravastin 40 mg; tizandidine 4mg oral tablet 1-2/day     Allergies:   Review of patient's allergies indicates:  Not on File     Imaging, : MRI Lumbar spine: 4/5/2022: grade 1 degenerative anterolisthesis of L4 on L5; Multilevel degenerative disc disease; broad based disc bulging with moderate to severe facet arthrosis at L3-L4 and more so at L4-L5 - bilaterally as well as on the left at L2-3.     Prior Therapy: Rod did his therapy s/p Right VLADIMIR with us back in 2020.   Social History: lives with wife; single story home    Occupation: retired   Prior Level of Function: Independent; active; golfs 3-4x/week;  2x/week   Current Level of Function: Independent; hasn't been golfing much over the holidays; just returned from visiting family in [place];     Pain:  Current 2/10, worst 3/10, best 0/10   Location: left hip; lateral/front of thigh; sometimes lower leg   Description: Aching, Grabbing, Tight, and Shooting  Aggravating Factors: Standing, Walking, and Extension  Easing Factors: gabapentin and tizandidine really has made a difference in his leg pain - continues to take this; no longer feels like his knee will buckle; doesn't get shocking sensation into LLE; just some aching in hip and thigh     Pts goals: To improve my left hip motion,  "improve core strength and stability     Objective     Observation: Rod ambulated into clinic, antalgic gait pattern with decreased trunk rotation and ability to extend left hip on stance and push-off; he has forward truncal flexion at hips. Rod is alert/oriented x 4; Pleasant, cooperative and in no apparent acute distress     Posture: forward trunk flexion; anterior pelvic tilt, unable to  neutral secondary to loss of left hip extension; varus deformity at knees, left > right, slight leg length discrepancy noted - left about 2/8" shorter than right as result of hip position and inability to straighten his left knee.     Hip Range of Motion:  Right Hip: WFL in all planes   Left Hip: flexion WFL, but has tightness in hip flexors that limit hi ability to extend the hip as well as joint stiffness in hip that limited extension - cannot reach neutral position; Limited left hip IR to almost 0, left hip ER to 20, Abduction to 15 deg.   Right/Left Knees: WFL    Lumbar Spine: reduced flexion, but functional for him; significantly reduced extension - can just get back to neutral alignment, but no extension from there; Lateral side-bending - hands to mid thigh, but with some rotation.       Lower Extremity Strength   Right LE Left LE   Knee extension: 5/5 4+/5   Knee flexion: 5/5 4+/5   Hip flexion: 5/5 4+/5   Hip Internal Rotation:  5/5    3/5 within ROM       Hip External Rotation: 5/5    4+/5 within ROM       Hip extension:  5/5 3/5 within ROM    Hip abduction: 4+/5  4/5   Hip adduction: 4/5 4-/5   Ankle dorsiflexion: 5/5 5/5   Ankle plantarflexion: 4/5 4/5     Special Tests:  Negative PAUL in Left Hip; Positive FADIR     SLS: Firm 10 sec,  Foam 6 sec  Toe Tap test: 10 reps on 6" step with unaffecting leg in 15 seconds  30 sec chair rise: 8 reps with arms crossed    Palpation: moderate tenderness to palpation at left hip - lateral and posterior hip along piriformis, gluteal medius     Sensation: intact to light " "touch BLE's proximally; distally - reduced sensation from ankles into feet     Flexibility:     90/90 SLR = R moderate restriction, L significant restriction   Ely's test: R minimal restriction, L minimal restriction   Mireya's test: R no restriction, L moderate restriction   Raji test: R minimal restriction, L significant restriction    PT Evaluation Completed? Yes  Discussed Plan of Care with patient: Yes       Limitation/Restriction for FOTO Lumbar  Survey    Therapist reviewed FOTO scores for Rod De La Paz on 1/3/2023.   FOTO documents entered into [employer] - see Media section.    Limitation Score: 30 %         Home Exercises and Patient Education Provided    Education provided:   - Plan of treatment to improve flexibility in left hip to maximize ROM  -Continue with core and LE strengthening   -Trial of heel lift in left shoe - 2/8" - immediate improvement in gait pattern noted with less lateral trunk flexion, and greater stability LLE. Patient will order one for himself.     Written Home Exercises Provided: Will instruct in and give a written HEP at next visit.   Rod demonstrated good  understanding of the education provided.     Assessment   Rod is a 77 y.o. male referred to outpatient Physical Therapy with a medical diagnosis of lumbar radiculopathy and left hip pain . Pt presents with decreased AROM/PROM left hip as result of hip OA; decreased strength in LLE, decreased core mm strength, impaired gait pattern and weight shifting secondary to left leg shortness and stiffness in left hip. Patient will benefit from Skilled Physical Therapy to address above deficits.  Treatment/visits may need to be alterted once Loc gets his plan/schedule for radiation tx.     Pt prognosis is Good.   Pt will benefit from skilled outpatient Physical Therapy to address the deficits stated above and in the chart below, provide pt/family education, and to maximize pt's level of independence.     Plan of care discussed with patient: " Yes  Pt's spiritual, cultural and educational needs considered and patient is agreeable to the plan of care and goals as stated below:     Anticipated Barriers for therapy: none     Medical Necessity is demonstrated by the following  History  Co-morbidities and personal factors that may impact the plan of care Co-morbidities:   history of cancer, HTN, prior hip surgery, and prior lumbar surgery    Personal Factors:   no deficits     moderate   Examination  Body Structures and Functions, activity limitations and participation restrictions that may impact the plan of care Body Regions:   back  lower extremities  trunk    Body Systems:    gross symmetry  ROM  strength  gross coordinated movement  balance  gait    Participation Restrictions:   None at this time     Activity limitations:   Learning and applying knowledge  no deficits    General Tasks and Commands  no deficits    Communication  no deficits    Mobility  lifting and carrying objects  walking    Self care  washing oneself (bathing, drying, washing hands)  dressing    Domestic Life  shopping  doing house work (cleaning house, washing dishes, laundry)    Interactions/Relationships  no deficits    Life Areas  no deficits    Community and Social Life  community life  recreation and leisure         moderate   Clinical Presentation stable and uncomplicated low   Decision Making/ Complexity Score: moderate     Goals:  Long Term Goals: 6 weeks   Maintain left hip and lower back pain to no more than 2/10 with return to golfing and usual daily activities   Able to walk several blocks without increased pain or compensation   Able to get shoes/socks on left LE without increased pain or compensation   Able to return to golf - able to shift weight from right to left during swing without buckling of knee.   Improve limitation score on FOTO to </= 27%   Independent with HEP for continued improvement in functional mobility       Plan   Plan of care Certification: 1/3/2023 to  3/31/2023.    Outpatient Physical Therapy 2 times weekly for 6 weeks to include the following interventions: Gait Training, Manual Therapy, Neuromuscular Re-ed, Patient Education, Therapeutic Activities, and Therapeutic Exercise.     Rose Ballesteros, PT

## 2023-01-06 ENCOUNTER — CLINICAL SUPPORT (OUTPATIENT)
Dept: REHABILITATION | Facility: HOSPITAL | Age: 78
End: 2023-01-06
Payer: MEDICARE

## 2023-01-06 DIAGNOSIS — R26.89 IMPAIRED GAIT AND MOBILITY: Primary | ICD-10-CM

## 2023-01-06 PROCEDURE — 97110 THERAPEUTIC EXERCISES: CPT | Mod: PN

## 2023-01-06 PROCEDURE — 97140 MANUAL THERAPY 1/> REGIONS: CPT | Mod: PN

## 2023-01-06 NOTE — PROGRESS NOTES
"[facility]   Physical Therapy Treatment Note     Name: Rod De La Paz  Clinic Number: [id number]    Therapy Diagnosis:   Encounter Diagnosis   Name Primary?    Impaired gait and mobility Yes     Physician: Alton Moeller    Visit Date: 1/6/2023    Physician Orders: PT Eval and Treat   Medical Diagnosis from Referral: Lumbar Radiculopathy - LLE pain   Evaluation Date: 1/3/2023  Authorization Period Expiration: 12/31/2023  Plan of Care Expiration: 3/31/2023  Visit # / Visits authorized: 1/ 12  FOTO Visit #:  1/3    PTA Visit #: 0/5     Time In: 7:45 am   Time Out: 8:40   Total Billable Time: 40 minutes    SUBJECTIVE     Pt reports: I'm doing fine; went for casting for upcoming radiation the other day; Also had RAYMOND for L4/L5 on Wednesday; reporting decrease in his lower back and Left LE pain. .  He was compliant with home exercise program.  Response to previous treatment: eval only, but decreased pain following RAYMOND   Functional change: wearing 2/8" lift in left shoe - finds it is improving his gait. Plans on putting some in his golf shoes today.     Pain: 0/10  Location: left hip/LE      OBJECTIVE     Objective Measures updated at progress report unless specified.     Treatment     Rod received the treatments listed below:      therapeutic exercises to develop strength, ROM, flexibility, posture, and core stabilization for 30 minutes including:  Recumbent bike - Level 8 x 15 mins   S/L left/right hip abduction x 15  S/L clams - red band around thighs  Bridges - red band  around thighs   Pelvic tilts x 10  Activation of T/A with marching x 10   Prone - alternate leg x 10     manual therapy techniques: Joint mobilizations and Soft tissue Mobilization were applied to the: Left hip  for 12minutes, including:  Left hip - long axis distraction with end range hold; posterior capsule stretch; Inferior glide of femur in acetabulum; prone - anterior gliding to stretch anterior capsule. STM of " hip flexors, ITB to address restrictions.     neuromuscular re-education activities to improve:  for  minutes. The following activities were included:      therapeutic activities to improve functional performance for   minutes, including:      gait training to improve functional mobility and safety for   minutes, including:        Patient Education and Home Exercises     Home Exercises Provided and Patient Education Provided     Education provided:   - Hip ROM exercises     Written Home Exercises Provided: yes. Exercises were reviewed and Rod was able to demonstrate them prior to the end of the session.  Rod demonstrated good  understanding of the education provided. See EMR under Patient Instructions for exercises provided during therapy sessions    ASSESSMENT     Rod is demonstrating some improvement in gait and standing posture - slightly less forward lean noted with walking; RAYMOND was helpful in relieving his lower back discomfort.     Rod Is progressing well towards his goals.   Pt prognosis is Good.     Pt will continue to benefit from skilled outpatient physical therapy to address the deficits listed in the problem list box on initial evaluation, provide pt/family education and to maximize pt's level of independence in the home and community environment.     Pt's spiritual, cultural and educational needs considered and pt agreeable to plan of care and goals.     Anticipated barriers to physical therapy: radiation schedule   Goals:  Long Term Goals: 6 weeks   Maintain left hip and lower back pain to no more than 2/10 with return to golfing and usual daily activities   Able to walk several blocks without increased pain or compensation   Able to get shoes/socks on left LE without increased pain or compensation   Able to return to golf - able to shift weight from right to left during swing without buckling of knee.   Improve limitation score on FOTO to </= 27%   Independent with HEP for continued  improvement in functional mobility      Goals:       PLAN     Continue with Skilled physical Therapy per POC     Rose Ballesteros, PT

## 2023-01-09 ENCOUNTER — CLINICAL SUPPORT (OUTPATIENT)
Dept: REHABILITATION | Facility: HOSPITAL | Age: 78
End: 2023-01-09
Payer: MEDICARE

## 2023-01-09 DIAGNOSIS — R26.89 IMPAIRED GAIT AND MOBILITY: Primary | ICD-10-CM

## 2023-01-09 PROCEDURE — 97140 MANUAL THERAPY 1/> REGIONS: CPT | Mod: PN

## 2023-01-09 PROCEDURE — 97110 THERAPEUTIC EXERCISES: CPT | Mod: PN

## 2023-01-09 NOTE — PROGRESS NOTES
"[facility]   Physical Therapy Treatment Note     Name: Rod De La Paz  Clinic Number: [id number]    Therapy Diagnosis:   Encounter Diagnosis   Name Primary?    Impaired gait and mobility Yes     Physician: Alton Moeller    Visit Date: 1/9/2023    Physician Orders: PT Eval and Treat   Medical Diagnosis from Referral: Lumbar Radiculopathy - LLE pain   Evaluation Date: 1/3/2023  Authorization Period Expiration: 12/31/2023  Plan of Care Expiration: 3/31/2023  Visit # / Visits authorized: 3/ 12  FOTO Visit #:  1/3    PTA Visit #: 0/5     Time In: 8:30 am   Time Out:  9:20 am    Total Billable Time: 40 minutes    SUBJECTIVE     Pt reports: I'm doing fine;   He was compliant with home exercise program.  Response to previous treatment: eval only, but decreased pain following RAYMOND   Functional change: wearing 2/8" lift in left shoe - finds it is improving his gait. Plans on putting some in his golf shoes today.     Pain: 0/10  Location: left hip/LE      OBJECTIVE     Objective Measures updated at progress report unless specified.     Treatment     Rod received the treatments listed below:      therapeutic exercises to develop strength, ROM, flexibility, posture, and core stabilization for 30 minutes including:  Recumbent bike - Level 8 x 10 mins   S/L left/right hip abduction x 15  S/L clams - red band around thighs  Bridges - red band  around thighs   Pelvic tilts x 10  Activation of T/A with marching x 10   Prone - alternate leg x 10   Quadruped - neutral spine into posterior pelvic tilt x 6 with 5" hold     manual therapy techniques: Joint mobilizations and Soft tissue Mobilization were applied to the: Left hip  for 12minutes, including:  Left hip - long axis distraction with end range hold; posterior capsule stretch; Inferior glide of femur in acetabulum; prone - anterior gliding to stretch anterior capsule. STM of hip flexors, ITB to address restrictions.     neuromuscular " re-education activities to improve:  for  minutes. The following activities were included:      therapeutic activities to improve functional performance for   minutes, including:      gait training to improve functional mobility and safety for   minutes, including:        Patient Education and Home Exercises     Home Exercises Provided and Patient Education Provided     Education provided:   - Hip ROM exercises     Written Home Exercises Provided: yes. Exercises were reviewed and Rod was able to demonstrate them prior to the end of the session.  Rod demonstrated good  understanding of the education provided. See EMR under Patient Instructions for exercises provided during therapy sessions    ASSESSMENT     Rod is demonstrating some improvement in gait and standing posture - slightly less forward lean noted with walking;      Rod Is progressing well towards his goals.   Pt prognosis is Good.     Pt will continue to benefit from skilled outpatient physical therapy to address the deficits listed in the problem list box on initial evaluation, provide pt/family education and to maximize pt's level of independence in the home and community environment.     Pt's spiritual, cultural and educational needs considered and pt agreeable to plan of care and goals.     Anticipated barriers to physical therapy: radiation schedule   Goals:  Long Term Goals: 6 weeks   Maintain left hip and lower back pain to no more than 2/10 with return to golfing and usual daily activities   Able to walk several blocks without increased pain or compensation   Able to get shoes/socks on left LE without increased pain or compensation   Able to return to golf - able to shift weight from right to left during swing without buckling of knee.   Improve limitation score on FOTO to </= 27%   Independent with HEP for continued improvement in functional mobility            PLAN     Continue with Skilled physical Therapy per POC     Rose Ballesteros, PT

## 2023-01-11 ENCOUNTER — CLINICAL SUPPORT (OUTPATIENT)
Dept: REHABILITATION | Facility: HOSPITAL | Age: 78
End: 2023-01-11
Payer: MEDICARE

## 2023-01-11 DIAGNOSIS — R26.89 IMPAIRED GAIT AND MOBILITY: Primary | ICD-10-CM

## 2023-01-11 PROCEDURE — 97110 THERAPEUTIC EXERCISES: CPT | Mod: PN

## 2023-01-11 PROCEDURE — 97140 MANUAL THERAPY 1/> REGIONS: CPT | Mod: PN

## 2023-01-11 NOTE — PROGRESS NOTES
"[facility]   Physical Therapy Treatment Note     Name: Rod De La Paz  Clinic Number: [id number]    Therapy Diagnosis:   Encounter Diagnosis   Name Primary?    Impaired gait and mobility Yes     Physician: Alton Moeller    Visit Date: 1/11/2023    Physician Orders: PT Eval and Treat   Medical Diagnosis from Referral: Lumbar Radiculopathy - LLE pain   Evaluation Date: 1/3/2023  Authorization Period Expiration: 12/31/2023  Plan of Care Expiration: 3/31/2023  Visit # / Visits authorized: 3/ 12  FOTO Visit #:  1/3    PTA Visit #: 0/5     Time In: 7:45 am   Time Out:  8:40  am    Total Billable Time: 40 minutes    SUBJECTIVE     Pt reports: I'm doing fine; a little achy in lower back today   He was compliant with home exercise program.  Response to previous treatment: eval only, but decreased pain following RAYMOND   Functional change: wearing 2/8" lift in left shoe - finds it is improving his gait. Plans on putting some in his golf shoes today.     Pain: 1/10   Location: left hip/LE      OBJECTIVE     Objective Measures updated at progress report unless specified.     Treatment     Rod received the treatments listed below:      therapeutic exercises to develop strength, ROM, flexibility, posture, and core stabilization for 30 minutes including:  Recumbent bike - Level 8 x 10 mins   S/L left/right hip abduction x 15  S/L clams - red band around thighs  Bridges - red band  around thighs   Pelvic tilts x 10  Activation of T/A with marching x 10   Prone - alternate leg x 10   Quadruped - neutral spine into posterior pelvic tilt x 6 with 5" hold   Standing: - TKE on 4"step - front and lateral x 20   Standing: backwards step-up onto 6" step x 20  Standing: squats while standing on Air-Ex Mat x 20     manual therapy techniques: Joint mobilizations and Soft tissue Mobilization were applied to the: Left hip  for 12minutes, including:  Left hip - long axis distraction with end range hold; " posterior capsule stretch; Inferior glide of femur in acetabulum; prone - anterior gliding to stretch anterior capsule. STM of hip flexors, ITB to address restrictions.  S/L on right - flexion of L1 through L5/S1 with blocking of segment above; Paraspinals skin rolling, and cross friction of quadratus on left at pelvis; Supine : posterior rotation on left innominate     neuromuscular re-education activities to improve:  for  minutes. The following activities were included:      therapeutic activities to improve functional performance for   minutes, including:      gait training to improve functional mobility and safety for   minutes, including:        Patient Education and Home Exercises     Home Exercises Provided and Patient Education Provided     Education provided:   - Hip ROM exercises     Written Home Exercises Provided: yes. Exercises were reviewed and Rod was able to demonstrate them prior to the end of the session.  Rod demonstrated good  understanding of the education provided. See EMR under Patient Instructions for exercises provided during therapy sessions    ASSESSMENT     Rod is reporting a slight increase in his lower back discomfort today, but nothing that he is concerned about. Continues to report overall improvement in function     Rod Is progressing well towards his goals.   Pt prognosis is Good.     Pt will continue to benefit from skilled outpatient physical therapy to address the deficits listed in the problem list box on initial evaluation, provide pt/family education and to maximize pt's level of independence in the home and community environment.     Pt's spiritual, cultural and educational needs considered and pt agreeable to plan of care and goals.     Anticipated barriers to physical therapy: radiation schedule   Goals:  Long Term Goals: 6 weeks   Maintain left hip and lower back pain to no more than 2/10 with return to golfing and usual daily activities   Able to walk several  blocks without increased pain or compensation   Able to get shoes/socks on left LE without increased pain or compensation   Able to return to golf - able to shift weight from right to left during swing without buckling of knee.   Improve limitation score on FOTO to </= 27%   Independent with HEP for continued improvement in functional mobility            PLAN     Continue with Skilled physical Therapy per POC     Rose Ballesteros, PT

## 2023-01-18 ENCOUNTER — CLINICAL SUPPORT (OUTPATIENT)
Dept: REHABILITATION | Facility: HOSPITAL | Age: 78
End: 2023-01-18
Payer: MEDICARE

## 2023-01-18 DIAGNOSIS — R26.89 IMPAIRED GAIT AND MOBILITY: Primary | ICD-10-CM

## 2023-01-18 PROCEDURE — 97110 THERAPEUTIC EXERCISES: CPT | Mod: PN

## 2023-01-18 PROCEDURE — 97140 MANUAL THERAPY 1/> REGIONS: CPT | Mod: PN

## 2023-01-18 NOTE — PROGRESS NOTES
"[facility]   Physical Therapy Treatment Note     Name: Rod De La Paz  Clinic Number: [id number]    Therapy Diagnosis:   Encounter Diagnosis   Name Primary?    Impaired gait and mobility Yes     Physician: Alton Moeller    Visit Date: 1/18/2023    Physician Orders: PT Eval and Treat   Medical Diagnosis from Referral: Lumbar Radiculopathy - LLE pain   Evaluation Date: 1/3/2023  Authorization Period Expiration: 12/31/2023  Plan of Care Expiration: 3/31/2023  Visit # / Visits authorized: 4 / 12  FOTO Visit #:  1/3    PTA Visit #: 0/5     Time In: 7:45 am   Time Out:  8:40    am    Total Billable Time: 40 minutes    SUBJECTIVE     Pt reports: I'm doing fine; going to meet with oncologist this morning; Had visit with Dr. 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   He was compliant with home exercise program.  Response to previous treatment: improved mobility, less pain;   Functional change: walking better today; less antalgia in gait today;    Pain: 1/10   Location: left hip/LE      OBJECTIVE     Objective Measures updated at progress report unless specified.     Treatment     Rod received the treatments listed below:      therapeutic exercises to develop strength, ROM, flexibility, posture, and core stabilization for 30 minutes including:  Recumbent bike - Level 8 x 13  mins   S/L left/right hip abduction x 15  S/L clams - red band around thighs  Bridges - red band  around thighs   Pelvic tilts x 10  Activation of T/A with marching x 10   Prone - alternate leg x 10   Quadruped - neutral spine into posterior pelvic tilt x 6 with 5" hold   Standing: - TKE on 4"step - front and lateral x 20   Standing: backwards step-up onto 6" step x 20  Standing: squats while standing on Air-Ex Mat x 20     manual therapy techniques: Joint mobilizations and Soft tissue Mobilization were applied to the: Left hip  for 12minutes, including:  Left hip - long axis " distraction with end range hold; posterior capsule stretch; Inferior glide of femur in acetabulum; prone - anterior gliding to stretch anterior capsule. STM of hip flexors, ITB to address restrictions.  S/L on right - flexion of L1 through L5/S1 with blocking of segment above; Paraspinals skin rolling, and cross friction of quadratus on left at pelvis; Supine : posterior rotation on left innominate     neuromuscular re-education activities to improve:  for  minutes. The following activities were included:      therapeutic activities to improve functional performance for   minutes, including:      gait training to improve functional mobility and safety for   minutes, including:        Patient Education and Home Exercises     Home Exercises Provided and Patient Education Provided     Education provided:   - Hip ROM exercises     Written Home Exercises Provided: yes. Exercises were reviewed and Rod was able to demonstrate them prior to the end of the session.  Rod demonstrated good  understanding of the education provided. See EMR under Patient Instructions for exercises provided during therapy sessions    ASSESSMENT     Rod continues to do well; He does not plan on doing anything surgical with his left hip until he is finished with all of his radiation treatments and then only when he cannot stand the pain any longer.  Continues to be functionally independent; needs further core/balance work for trunk and LE's.     Rod Is progressing well towards his goals.   Pt prognosis is Good.     Pt will continue to benefit from skilled outpatient physical therapy to address the deficits listed in the problem list box on initial evaluation, provide pt/family education and to maximize pt's level of independence in the home and community environment.     Pt's spiritual, cultural and educational needs considered and pt agreeable to plan of care and goals.     Anticipated barriers to physical therapy: radiation schedule    Goals:  Long Term Goals: 6 weeks   Maintain left hip and lower back pain to no more than 2/10 with return to golfing and usual daily activities   Able to walk several blocks without increased pain or compensation   Able to get shoes/socks on left LE without increased pain or compensation   Able to return to golf - able to shift weight from right to left during swing without buckling of knee.   Improve limitation score on FOTO to </= 27%   Independent with HEP for continued improvement in functional mobility            PLAN     Continue with Skilled physical Therapy per POC     Rose Ballesteros, PT

## 2023-01-24 ENCOUNTER — CLINICAL SUPPORT (OUTPATIENT)
Dept: REHABILITATION | Facility: HOSPITAL | Age: 78
End: 2023-01-24
Payer: MEDICARE

## 2023-01-24 DIAGNOSIS — R26.89 IMPAIRED GAIT AND MOBILITY: Primary | ICD-10-CM

## 2023-01-24 PROCEDURE — 97140 MANUAL THERAPY 1/> REGIONS: CPT | Mod: PN

## 2023-01-24 PROCEDURE — 97110 THERAPEUTIC EXERCISES: CPT | Mod: PN

## 2023-01-24 NOTE — PROGRESS NOTES
"[facility]   Physical Therapy Treatment Note     Name: Rod De La Paz  Clinic Number: [id number]    Therapy Diagnosis:   Encounter Diagnosis   Name Primary?    Impaired gait and mobility Yes     Physician: Alton Moeller    Visit Date: 1/24/2023    Physician Orders: PT Eval and Treat   Medical Diagnosis from Referral: Lumbar Radiculopathy - LLE pain   Evaluation Date: 1/3/2023  Authorization Period Expiration: 12/31/2023  Plan of Care Expiration: 3/31/2023  Visit # / Visits authorized: 5 / 12  FOTO Visit #:  1/3    PTA Visit #: 0/5     Time In: 9:15  am   Time Out:  10:15     am    Total Billable Time: 45 minutes    SUBJECTIVE     Pt reports: I'm all set for my radiation; I later this afternoon.  My lower back is still doing fine since I had to RAYMOND   He was compliant with home exercise program.  Response to previous treatment: improved mobility, less pain;   Functional change: walking better today; less antalgia in gait today;    Pain: 1/10   Location: left hip/LE      OBJECTIVE     Objective Measures updated at progress report unless specified.     Treatment     Rod received the treatments listed below:      therapeutic exercises to develop strength, ROM, flexibility, posture, and core stabilization for 30 minutes including:  Recumbent bike - Level 8 x 13  mins   S/L left/right hip abduction x 15  S/L clams - black band around thighs  Bridges - black band  around thighs   Pelvic tilts x 10 - activate TrAbs first then posterior tilt; palms push down on table for increased core activation.   Activation of T/A with marching x 10   Prone - alternate leg x 10   Quadruped - neutral spine into posterior pelvic tilt x 6 with 5" hold   Standing: - TKE on 4"step - front and lateral x 20   Standing: backwards step-up onto 6" step x 20  Standing: squats while standing on Air-Ex Mat x 20   Cable Column:   Paloff presses with rotation - 10#   High > Low - 10#   Low > High - 10#     manual " therapy techniques: Joint mobilizations and Soft tissue Mobilization were applied to the: Left hip  for 12minutes, including:  Left hip - long axis distraction with end range hold; posterior capsule stretch; Inferior glide of femur in acetabulum; prone - anterior gliding to stretch anterior capsule. STM of hip flexors, ITB to address restrictions.  S/L on right - flexion of L1 through L5/S1 with blocking of segment above; Paraspinals skin rolling, and cross friction of quadratus on left at pelvis; Supine : posterior rotation on left innominate     neuromuscular re-education activities to improve:  for  minutes. The following activities were included:      therapeutic activities to improve functional performance for   minutes, including:      gait training to improve functional mobility and safety for   minutes, including:        Patient Education and Home Exercises     Home Exercises Provided and Patient Education Provided     Education provided:   - Hip ROM exercises     Written Home Exercises Provided: yes. Exercises were reviewed and Rod was able to demonstrate them prior to the end of the session.  Rod demonstrated good  understanding of the education provided. See EMR under Patient Instructions for exercises provided during therapy sessions    ASSESSMENT     Rod continues to do well; He does not plan on doing anything surgical with his left hip until he is finished with all of his radiation treatments and then only when he cannot stand the pain any longer.  Continues to be functionally independent; needs further core/balance work for trunk and LE's.     Rod Is progressing well towards his goals.   Pt prognosis is Good.     Pt will continue to benefit from skilled outpatient physical therapy to address the deficits listed in the problem list box on initial evaluation, provide pt/family education and to maximize pt's level of independence in the home and community environment.     Pt's spiritual, cultural  and educational needs considered and pt agreeable to plan of care and goals.     Anticipated barriers to physical therapy: radiation schedule   Goals:  Long Term Goals: 6 weeks   Maintain left hip and lower back pain to no more than 2/10 with return to golfing and usual daily activities   Able to walk several blocks without increased pain or compensation   Able to get shoes/socks on left LE without increased pain or compensation   Able to return to golf - able to shift weight from right to left during swing without buckling of knee.   Improve limitation score on FOTO to </= 27%   Independent with HEP for continued improvement in functional mobility            PLAN     Continue with Skilled physical Therapy per POC     Rose Ballesteros, PT

## 2023-01-27 ENCOUNTER — CLINICAL SUPPORT (OUTPATIENT)
Dept: REHABILITATION | Facility: HOSPITAL | Age: 78
End: 2023-01-27
Payer: MEDICARE

## 2023-01-27 DIAGNOSIS — R26.89 IMPAIRED GAIT AND MOBILITY: Primary | ICD-10-CM

## 2023-01-27 PROCEDURE — 97140 MANUAL THERAPY 1/> REGIONS: CPT | Mod: PN

## 2023-01-27 PROCEDURE — 97110 THERAPEUTIC EXERCISES: CPT | Mod: PN

## 2023-01-27 NOTE — PROGRESS NOTES
"[facility]   Physical Therapy Treatment Note     Name: Rod De La Paz  Clinic Number: [id number]    Therapy Diagnosis:   Encounter Diagnosis   Name Primary?    Impaired gait and mobility Yes     Physician: Alton Moeller    Visit Date: 1/27/2023    Physician Orders: PT Eval and Treat   Medical Diagnosis from Referral: Lumbar Radiculopathy - LLE pain   Evaluation Date: 1/3/2023  Authorization Period Expiration: 12/31/2023  Plan of Care Expiration: 3/31/2023  Visit # / Visits authorized: 5 / 12  FOTO Visit #:  1/3    PTA Visit #: 0/5     Time In: 9:15  am   Time Out:  10:15     am    Total Billable Time: 45 minutes    SUBJECTIVE     Pt reports: Started radiation x 4 at this point, doing ok with this, positioning a little touch in his LLE secondary to restriction in hip mobility   He was compliant with home exercise program.  Response to previous treatment: improved mobility, less pain;   Functional change: walking better today; less antalgia in gait today;    Pain: 1/10   Location: left hip/LE      OBJECTIVE     Objective Measures updated at progress report unless specified.     Treatment     Rod received the treatments listed below:      therapeutic exercises to develop strength, ROM, flexibility, posture, and core stabilization for 30 minutes including:  Recumbent bike - Level 8 x 13  mins   S/L left/right hip abduction x 15  S/L clams - black band around thighs  Bridges - black band  around thighs   Pelvic tilts x 10 - activate TrAbs first then posterior tilt; palms push down on table for increased core activation.   Activation of T/A with marching x 10   Prone - alternate leg x 10   Quadruped - neutral spine into posterior pelvic tilt x 6 with 5" hold   Standing: - TKE on 4"step - front and lateral x 20   Standing: backwards step-up onto 6" step x 20  Standing: squats while standing on Air-Ex Mat x 20   Cable Column:   Paloff presses with rotation - 10#   High > Low - 13#   Low " > High - 13#   Scapular retraction - rows 13# - alternate lead foot x 20 each     manual therapy techniques: Joint mobilizations and Soft tissue Mobilization were applied to the: Left hip  for 12minutes, including:  Left hip - long axis distraction with end range hold; posterior capsule stretch; Inferior glide of femur in acetabulum; prone - anterior gliding to stretch anterior capsule. STM of hip flexors, ITB to address restrictions.  S/L on right - flexion of L1 through L5/S1 with blocking of segment above; Paraspinals skin rolling, and cross friction of quadratus on left at pelvis; Supine : posterior rotation on left innominate     neuromuscular re-education activities to improve:  for  minutes. The following activities were included:      therapeutic activities to improve functional performance for   minutes, including:      gait training to improve functional mobility and safety for   minutes, including:        Patient Education and Home Exercises     Home Exercises Provided and Patient Education Provided     Education provided:   - Hip ROM exercises     Written Home Exercises Provided: yes. Exercises were reviewed and Rod was able to demonstrate them prior to the end of the session.  Rod demonstrated good  understanding of the education provided. See EMR under Patient Instructions for exercises provided during therapy sessions    ASSESSMENT     Rod continues to do well; Increased level of exercise for more core engagement. Did well with this. He does not plan on doing anything surgical with his left hip until he is finished with all of his radiation treatments and then only when he cannot stand the pain any longer.  Continues to be functionally independent; needs further core/balance work for trunk and LE's.     Rod Is progressing well towards his goals.   Pt prognosis is Good.     Pt will continue to benefit from skilled outpatient physical therapy to address the deficits listed in the problem list  box on initial evaluation, provide pt/family education and to maximize pt's level of independence in the home and community environment.     Pt's spiritual, cultural and educational needs considered and pt agreeable to plan of care and goals.     Anticipated barriers to physical therapy: radiation schedule   Goals:  Long Term Goals: 6 weeks   Maintain left hip and lower back pain to no more than 2/10 with return to golfing and usual daily activities   Able to walk several blocks without increased pain or compensation   Able to get shoes/socks on left LE without increased pain or compensation   Able to return to golf - able to shift weight from right to left during swing without buckling of knee.   Improve limitation score on FOTO to </= 27%   Independent with HEP for continued improvement in functional mobility            PLAN     Continue with Skilled physical Therapy per POC     Rose Ballesteros, PT

## 2023-01-30 ENCOUNTER — CLINICAL SUPPORT (OUTPATIENT)
Dept: REHABILITATION | Facility: HOSPITAL | Age: 78
End: 2023-01-30
Payer: MEDICARE

## 2023-01-30 DIAGNOSIS — R26.89 IMPAIRED GAIT AND MOBILITY: Primary | ICD-10-CM

## 2023-01-30 PROCEDURE — 97140 MANUAL THERAPY 1/> REGIONS: CPT | Mod: PN

## 2023-01-30 PROCEDURE — 97110 THERAPEUTIC EXERCISES: CPT | Mod: PN

## 2023-01-30 NOTE — PROGRESS NOTES
"[facility]   Physical Therapy Treatment Note     Name: Rod De La Paz  Clinic Number: [id number]    Therapy Diagnosis:   Encounter Diagnosis   Name Primary?    Impaired gait and mobility Yes     Physician: Alton Moeller    Visit Date: 1/30/2023    Physician Orders: PT Eval and Treat   Medical Diagnosis from Referral: Lumbar Radiculopathy - LLE pain   Evaluation Date: 1/3/2023  Authorization Period Expiration: 12/31/2023  Plan of Care Expiration: 3/31/2023  Visit # / Visits authorized: 7/ 12  FOTO Visit #:  1/3    PTA Visit #: 0/5     Time In:  2:30 pm   Time Out:  3:30    Total Billable Time: 45 minutes    SUBJECTIVE     Pt reports:  Doing well; no new complaints.   He was compliant with home exercise program.  Response to previous treatment: improved mobility, less pain;   Functional change: walking better today; less antalgia in gait today;    Pain: 1/10   Location: left hip/LE      OBJECTIVE     Objective Measures updated at progress report unless specified.     Treatment     Rod received the treatments listed below:      therapeutic exercises to develop strength, ROM, flexibility, posture, and core stabilization for 30 minutes including:  Recumbent bike - Level 8 x 13  mins   S/L left/right hip abduction x 15  S/L clams - black band around thighs  Bridges - black band  around thighs   Pelvic tilts x 10 - activate TrAbs first then posterior tilt; palms push down on table for increased core activation.   Activation of T/A with marching x 10   Quadruped alternate arm and leg x 10   Quadruped - neutral spine into posterior pelvic tilt x 6 with 5" hold   Standing: - TKE on 4"step - front and lateral x 20   Standing: backwards step-up onto 6" step x 20  Standing: squats while standing on Air-Ex Mat x 20   Cable Column:   Paloff presses with rotation - 10#   High > Low - 13#   Low > High - 13#   Scapular retraction - rows 13# - alternate lead foot x 20 each     manual therapy " techniques: Joint mobilizations and Soft tissue Mobilization were applied to the: Left hip  for 12minutes, including:  Left hip - long axis distraction with end range hold; posterior capsule stretch; Inferior glide of femur in acetabulum; prone - anterior gliding to stretch anterior capsule. STM of hip flexors, ITB to address restrictions.  S/L on right - flexion of L1 through L5/S1 with blocking of segment above; Paraspinals skin rolling, and cross friction of quadratus on left at pelvis; Supine : posterior rotation on left innominate     neuromuscular re-education activities to improve:  for  minutes. The following activities were included:      therapeutic activities to improve functional performance for   minutes, including:      gait training to improve functional mobility and safety for   minutes, including:        Patient Education and Home Exercises     Home Exercises Provided and Patient Education Provided     Education provided:   - Hip ROM exercises     Written Home Exercises Provided: yes. Exercises were reviewed and Rod was able to demonstrate them prior to the end of the session.  Rod demonstrated good  understanding of the education provided. See EMR under Patient Instructions for exercises provided during therapy sessions    ASSESSMENT     Rod continues to do well; Increased level of exercise for more core engagement. Did well with this. He does not plan on doing anything surgical with his left hip until he is finished with all of his radiation treatments and then only when he cannot stand the pain any longer.  Continues to be functionally independent; needs further core/balance work for trunk and LE's.     Rod Is progressing well towards his goals.   Pt prognosis is Good.     Pt will continue to benefit from skilled outpatient physical therapy to address the deficits listed in the problem list box on initial evaluation, provide pt/family education and to maximize pt's level of independence  in the home and community environment.     Pt's spiritual, cultural and educational needs considered and pt agreeable to plan of care and goals.     Anticipated barriers to physical therapy: radiation schedule   Goals:  Long Term Goals: 6 weeks   Maintain left hip and lower back pain to no more than 2/10 with return to golfing and usual daily activities   Able to walk several blocks without increased pain or compensation   Able to get shoes/socks on left LE without increased pain or compensation   Able to return to golf - able to shift weight from right to left during swing without buckling of knee.   Improve limitation score on FOTO to </= 27%   Independent with HEP for continued improvement in functional mobility            PLAN     Continue with Skilled physical Therapy per POC     Rose Ballesteros, PT

## 2023-02-01 ENCOUNTER — CLINICAL SUPPORT (OUTPATIENT)
Dept: REHABILITATION | Facility: HOSPITAL | Age: 78
End: 2023-02-01
Payer: MEDICARE

## 2023-02-01 DIAGNOSIS — R26.89 IMPAIRED GAIT AND MOBILITY: Primary | ICD-10-CM

## 2023-02-01 PROCEDURE — 97140 MANUAL THERAPY 1/> REGIONS: CPT | Mod: PN

## 2023-02-01 PROCEDURE — 97110 THERAPEUTIC EXERCISES: CPT | Mod: PN

## 2023-02-01 NOTE — PROGRESS NOTES
"[facility]   Physical Therapy Treatment Note     Name: Rod De La Paz  Clinic Number: [id number]    Therapy Diagnosis:   Encounter Diagnosis   Name Primary?    Impaired gait and mobility Yes     Physician: Alton Moeller    Visit Date: 2/1/2023    Physician Orders: PT Eval and Treat   Medical Diagnosis from Referral: Lumbar Radiculopathy - LLE pain   Evaluation Date: 1/3/2023  Authorization Period Expiration: 12/31/2023  Plan of Care Expiration: 3/31/2023  Visit # / Visits authorized: 8 / 12  FOTO Visit #:  1/3    PTA Visit #: 0/5     Time In:  1:45 pm   Time Out: 2:40 pm    Total Billable Time: 45 minutes    SUBJECTIVE     Pt reports:  Doing well; no new complaints.   He was compliant with home exercise program.  Response to previous treatment: improved mobility, less pain;   Functional change: walking better today; less antalgia in gait today;    Pain: 1/10   Location: left hip/LE      OBJECTIVE     Objective Measures updated at progress report unless specified.     Treatment     Rod received the treatments listed below:      therapeutic exercises to develop strength, ROM, flexibility, posture, and core stabilization for 30 minutes including:  Recumbent bike - Level 8 x 13  mins   S/L left/right hip abduction x 15  S/L clams - black band around thighs  Bridges - black band  around thighs   Pelvic tilts x 10 - activate TrAbs first then posterior tilt; palms push down on table for increased core activation.   Activation of T/A with marching x 10   Quadruped alternate arm and leg x 10   Quadruped - neutral spine into posterior pelvic tilt x 6 with 5" hold   Standing: - TKE on 4"step - front and lateral x 20   Standing: backwards step-up onto 6" step x 20  Standing: squats while standing on Air-Ex Mat x 20   Cable Column:   Paloff presses with rotation - 10#   High > Low - 13#   Low > High - 13#   Scapular retraction - rows 13# - alternate lead foot x 20 each     manual therapy " techniques: Joint mobilizations and Soft tissue Mobilization were applied to the: Left hip  for 12minutes, including:  Left hip - long axis distraction with end range hold; posterior capsule stretch; Inferior glide of femur in acetabulum; prone - anterior gliding to stretch anterior capsule. STM of hip flexors, ITB to address restrictions.  S/L on right - flexion of L1 through L5/S1 with blocking of segment above; Paraspinals skin rolling, and cross friction of quadratus on left at pelvis; Supine : posterior rotation on left innominate     neuromuscular re-education activities to improve:  for  minutes. The following activities were included:      therapeutic activities to improve functional performance for   minutes, including:      gait training to improve functional mobility and safety for   minutes, including:        Patient Education and Home Exercises     Home Exercises Provided and Patient Education Provided     Education provided:   - Hip ROM exercises     Written Home Exercises Provided: yes. Exercises were reviewed and Rod was able to demonstrate them prior to the end of the session.  Rod demonstrated good  understanding of the education provided. See EMR under Patient Instructions for exercises provided during therapy sessions    ASSESSMENT     Rod continues to do well; Increased level of exercise for more core engagement. Did well with this. He does not plan on doing anything surgical with his left hip until he is finished with all of his radiation treatments and then only when he cannot stand the pain any longer.  Continues to be functionally independent; needs further core/balance work for trunk and LE's.     Rod Is progressing well towards his goals.   Pt prognosis is Good.     Pt will continue to benefit from skilled outpatient physical therapy to address the deficits listed in the problem list box on initial evaluation, provide pt/family education and to maximize pt's level of independence  in the home and community environment.     Pt's spiritual, cultural and educational needs considered and pt agreeable to plan of care and goals.     Anticipated barriers to physical therapy: radiation schedule   Goals:  Long Term Goals: 6 weeks   Maintain left hip and lower back pain to no more than 2/10 with return to golfing and usual daily activities   Able to walk several blocks without increased pain or compensation   Able to get shoes/socks on left LE without increased pain or compensation   Able to return to golf - able to shift weight from right to left during swing without buckling of knee.   Improve limitation score on FOTO to </= 27%   Independent with HEP for continued improvement in functional mobility            PLAN     Continue with Skilled physical Therapy per POC     Rose Ballesteros, PT

## 2023-02-06 ENCOUNTER — CLINICAL SUPPORT (OUTPATIENT)
Dept: REHABILITATION | Facility: HOSPITAL | Age: 78
End: 2023-02-06
Payer: MEDICARE

## 2023-02-06 DIAGNOSIS — R26.89 IMPAIRED GAIT AND MOBILITY: Primary | ICD-10-CM

## 2023-02-06 PROCEDURE — 97110 THERAPEUTIC EXERCISES: CPT | Mod: PN

## 2023-02-06 NOTE — PROGRESS NOTES
"[facility]   Physical Therapy Treatment Note     Name: Rod De La Paz  Clinic Number: [id number]    Therapy Diagnosis:   Encounter Diagnosis   Name Primary?    Impaired gait and mobility Yes     Physician: Alton Moeller    Visit Date: 2/6/2023    Physician Orders: PT Eval and Treat   Medical Diagnosis from Referral: Lumbar Radiculopathy - LLE pain   Evaluation Date: 1/3/2023  Authorization Period Expiration: 12/31/2023  Plan of Care Expiration: 3/31/2023  Visit # / Visits authorized: 9 / 12  FOTO Visit #:  1/3    PTA Visit #: 0/5     Time In:  1:45 pm   Time Out: 2:30 pm     Total Billable Time: 45 minutes    SUBJECTIVE     Pt reports:  Doing well; no new complaints.   He was compliant with home exercise program.  Response to previous treatment: improved mobility, less pain;   Functional change: walking better today; less antalgia in gait today;    Pain: 1/10   Location: left hip/LE      OBJECTIVE     Objective Measures updated at progress report unless specified.     Treatment     Rod received the treatments listed below:      therapeutic exercises to develop strength, ROM, flexibility, posture, and core stabilization for 35 minutes including:  Recumbent bike - Level 8 x 13  mins   S/L left/right hip abduction x 15  S/L clams - black band around thighs  Bridges - black band  around thighs x 10   Pelvic tilts x 10 - activate TrAbs first then posterior tilt; palms push down on table for increased core activation.   Activation of T/A with marching x 10   Quadruped alternate arm and leg x 10   Quadruped - neutral spine into posterior pelvic tilt x 6 with 5" hold   Standing: - TKE on 4"step - front and lateral x 20   Standing: backwards step-up onto 6" step x 20  Standing: squats x 20   Cable Column:   Paloff presses with rotation - 10#   High > Low - 13#   Low > High - 13#   Scapular retraction - rows 13# - alternate lead foot x 20 each     manual therapy techniques: Joint " mobilizations and Soft tissue Mobilization were applied to the: Left hip  for 5 minutes, including:  Left hip - long axis distraction with end range hold; posterior capsule stretch; Inferior glide of femur in acetabulum; prone - anterior gliding to stretch anterior capsule.   neuromuscular re-education activities to improve:  for  minutes. The following activities were included:      therapeutic activities to improve functional performance for   minutes, including:      gait training to improve functional mobility and safety for   minutes, including:        Patient Education and Home Exercises     Home Exercises Provided and Patient Education Provided     Education provided:   - Hip ROM exercises     Written Home Exercises Provided: yes. Exercises were reviewed and Rod was able to demonstrate them prior to the end of the session.  Rod demonstrated good  understanding of the education provided. See EMR under Patient Instructions for exercises provided during therapy sessions    ASSESSMENT     Rod continues to do well; Increased level of exercise for more core engagement. Did well with this. He does not plan on doing anything surgical with his left hip until he is finished with all of his radiation treatments and then only when he cannot stand the pain any longer.  Continues to be functionally independent; needs further core/balance work for trunk and LE's.     Rod Is progressing well towards his goals.   Pt prognosis is Good.     Pt will continue to benefit from skilled outpatient physical therapy to address the deficits listed in the problem list box on initial evaluation, provide pt/family education and to maximize pt's level of independence in the home and community environment.     Pt's spiritual, cultural and educational needs considered and pt agreeable to plan of care and goals.     Anticipated barriers to physical therapy: radiation schedule   Goals:  Long Term Goals: 6 weeks   Maintain left hip and  lower back pain to no more than 2/10 with return to golfing and usual daily activities   Able to walk several blocks without increased pain or compensation   Able to get shoes/socks on left LE without increased pain or compensation   Able to return to golf - able to shift weight from right to left during swing without buckling of knee.   Improve limitation score on FOTO to </= 27%   Independent with HEP for continued improvement in functional mobility            PLAN     Continue with Skilled physical Therapy per POC     Rose Ballesteros, PT

## 2023-02-08 ENCOUNTER — CLINICAL SUPPORT (OUTPATIENT)
Dept: REHABILITATION | Facility: HOSPITAL | Age: 78
End: 2023-02-08
Payer: MEDICARE

## 2023-02-08 DIAGNOSIS — R26.89 IMPAIRED GAIT AND MOBILITY: Primary | ICD-10-CM

## 2023-02-08 PROCEDURE — 97110 THERAPEUTIC EXERCISES: CPT | Mod: PN

## 2023-02-08 NOTE — PROGRESS NOTES
"[facility]   Physical Therapy Treatment Note     Name: Rod De La Paz  Clinic Number: [id number]    Therapy Diagnosis:   Encounter Diagnosis   Name Primary?    Impaired gait and mobility Yes     Physician: Alton Moeller    Visit Date: 2/8/2023    Physician Orders: PT Eval and Treat   Medical Diagnosis from Referral: Lumbar Radiculopathy - LLE pain   Evaluation Date: 1/3/2023  Authorization Period Expiration: 12/31/2023  Plan of Care Expiration: 3/31/2023  Visit # / Visits authorized: 10 /  12  FOTO Visit #:  1/3    PTA Visit #: 0/5     Time In:  9:15 am   Time Out: 10:00 am    Total Billable Time: 40 minutes    SUBJECTIVE     Pt reports:  Doing well; no new complaints.   He was compliant with home exercise program.  Response to previous treatment: improved mobility, less pain;   Functional change: walking better today; less antalgia in gait today;    Pain: 1/10   Location: left hip/LE      OBJECTIVE     Objective Measures updated at progress report unless specified.     Treatment     Rod received the treatments listed below:      therapeutic exercises to develop strength, ROM, flexibility, posture, and core stabilization for 35 minutes including:  Recumbent bike - Level 8 x 13  mins   S/L left/right hip abduction x 15  S/L clams - black band around thighs  Bridges - black band  around thighs x 10   Pelvic tilts x 10 - activate TrAbs first then posterior tilt; palms push down on table for increased core activation.   Activation of T/A with marching x 10   Quadruped alternate arm and leg x 10   Quadruped - neutral spine into posterior pelvic tilt x 6 with 5" hold   Standing: - TKE on 4"step - front and lateral x 20   Standing: backwards step-up onto 6" step x 20  Standing: squats x 20   Cable Column:   Paloff presses with rotation - 10#   High > Low - 13#   Low > High - 13#   Scapular retraction - rows 13# - alternate lead foot x 20 each     Squats with weighted ball (orange) " "  Standing 3 way hip - green tband   Psoas strengthen- SLS with opposite foot on 12"stool - lift foot off stool - past 90 deg hip flexion and hold x 5 - toe tap to bench and repeat x 10 each leg - 2 sets of 10       manual therapy techniques: Joint mobilizations and Soft tissue Mobilization were applied to the: Left hip  for 5 minutes, including:  Left hip - long axis distraction with end range hold; posterior capsule stretch; Inferior glide of femur in acetabulum; prone - anterior gliding to stretch anterior capsule.   neuromuscular re-education activities to improve:  for  minutes. The following activities were included:      therapeutic activities to improve functional performance for   minutes, including:      gait training to improve functional mobility and safety for   minutes, including:        Patient Education and Home Exercises     Home Exercises Provided and Patient Education Provided     Education provided:   - Hip ROM exercises     Written Home Exercises Provided: yes. Exercises were reviewed and Rod was able to demonstrate them prior to the end of the session.  Rod demonstrated good  understanding of the education provided. See EMR under Patient Instructions for exercises provided during therapy sessions    ASSESSMENT     Rod continues to do well; Increased level of exercise for more core engagement. Did well with this. He does not plan on doing anything surgical with his left hip until he is finished with all of his radiation treatments and then only when he cannot stand the pain any longer.  Continues to be functionally independent; needs further core/balance work for trunk and LE's.     Rod Is progressing well towards his goals.   Pt prognosis is Good.     Pt will continue to benefit from skilled outpatient physical therapy to address the deficits listed in the problem list box on initial evaluation, provide pt/family education and to maximize pt's level of independence in the home and " community environment.     Pt's spiritual, cultural and educational needs considered and pt agreeable to plan of care and goals.     Anticipated barriers to physical therapy: radiation schedule   Goals:  Long Term Goals: 6 weeks   Maintain left hip and lower back pain to no more than 2/10 with return to golfing and usual daily activities   Able to walk several blocks without increased pain or compensation   Able to get shoes/socks on left LE without increased pain or compensation   Able to return to golf - able to shift weight from right to left during swing without buckling of knee.   Improve limitation score on FOTO to </= 27%   Independent with HEP for continued improvement in functional mobility            PLAN     Continue with Skilled physical Therapy per POC     Rose Ballesteros, PT

## 2023-02-13 ENCOUNTER — CLINICAL SUPPORT (OUTPATIENT)
Dept: REHABILITATION | Facility: HOSPITAL | Age: 78
End: 2023-02-13
Payer: MEDICARE

## 2023-02-13 DIAGNOSIS — R26.89 IMPAIRED GAIT AND MOBILITY: Primary | ICD-10-CM

## 2023-02-13 PROCEDURE — 97110 THERAPEUTIC EXERCISES: CPT | Mod: PN

## 2023-02-13 NOTE — PROGRESS NOTES
"[facility]   Physical Therapy Treatment Note     Name: Rod De La Paz  Clinic Number: [id number]    Therapy Diagnosis:   Encounter Diagnosis   Name Primary?    Impaired gait and mobility Yes     Physician: Alton Moeller    Visit Date: 2/13/2023    Physician Orders: PT Eval and Treat   Medical Diagnosis from Referral: Lumbar Radiculopathy - LLE pain   Evaluation Date: 1/3/2023  Authorization Period Expiration: 12/31/2023  Plan of Care Expiration: 3/31/2023  Visit # / Visits authorized: 11 / 12  FOTO Visit #:  1/3    PTA Visit #: 0/5     Time In:  10:00  am   Time Out: 11:00  am    Total Billable Time: 50 minutes    SUBJECTIVE     Pt reports:  Doing well; no new complaints.   He was compliant with home exercise program.  Response to previous treatment: improved mobility, less pain;   Functional change: walking better today; less antalgia in gait today;    Pain: 1/10   Location: left hip/LE      OBJECTIVE     Objective Measures updated at progress report unless specified.     Treatment     Rod received the treatments listed below:      therapeutic exercises to develop strength, ROM, flexibility, posture, and core stabilization for 35 minutes including:  Recumbent bike - Level 8 x 13  mins   S/L left/right hip abduction x 15  S/L clams - black band around thighs  Bridges - black band  around thighs x 10   Pelvic tilts x 10 - activate TrAbs first then posterior tilt; palms push down on table for increased core activation.   Activation of T/A with marching x 10   Quadruped alternate arm and leg x 10   Quadruped - neutral spine into posterior pelvic tilt x 6 with 5" hold   3-way hip - blue tband   Standing: - TKE on 4"step - front and lateral x 20   Standing: backwards step-up onto 6" step x 20  Standing: squats x 20 - holding orange ball   Cable Column:   Paloff presses with rotation - 10#   High > Low - 13#   Low > High - 13#   Scapular retraction - rows 13# - alternate lead foot x " "20 each     Psoas strengthen- SLS with opposite foot on 12"stool - lift foot off stool - past 90 deg hip flexion and hold x 5 - toe tap to bench and repeat x 10 each leg - 2 sets of 10       manual therapy techniques: Joint mobilizations and Soft tissue Mobilization were applied to the: Left hip  for 5 minutes, including:  Left hip - long axis distraction with end range hold; posterior capsule stretch; Inferior glide of femur in acetabulum; prone - anterior gliding to stretch anterior capsule.     Neuromuscular re-education activities to improve:  for  minutes. The following activities were included:      therapeutic activities to improve functional performance for   minutes, including:      gait training to improve functional mobility and safety for   minutes, including:        Patient Education and Home Exercises     Home Exercises Provided and Patient Education Provided     Education provided:   - Hip ROM exercises     Written Home Exercises Provided: yes. Exercises were reviewed and Rod was able to demonstrate them prior to the end of the session.  Rod demonstrated good  understanding of the education provided. See EMR under Patient Instructions for exercises provided during therapy sessions    ASSESSMENT     Rod continues to do well; Increased level of exercise for more core engagement. Did well with this. He does not plan on doing anything surgical with his left hip until he is finished with all of his radiation treatments and then only when he cannot stand the pain any longer.  Continues to be functionally independent; needs further core/balance work for trunk and LE's.     Rod Is progressing well towards his goals.   Pt prognosis is Good.     Pt will continue to benefit from skilled outpatient physical therapy to address the deficits listed in the problem list box on initial evaluation, provide pt/family education and to maximize pt's level of independence in the home and community environment. "     Pt's spiritual, cultural and educational needs considered and pt agreeable to plan of care and goals.     Anticipated barriers to physical therapy: radiation schedule   Goals:  Long Term Goals: 6 weeks   Maintain left hip and lower back pain to no more than 2/10 with return to golfing and usual daily activities   Able to walk several blocks without increased pain or compensation   Able to get shoes/socks on left LE without increased pain or compensation   Able to return to golf - able to shift weight from right to left during swing without buckling of knee.   Improve limitation score on FOTO to </= 27%   Independent with HEP for continued improvement in functional mobility            PLAN     Continue with Skilled physical Therapy per POC     Rose Ballesteros, PT

## 2023-02-15 ENCOUNTER — CLINICAL SUPPORT (OUTPATIENT)
Dept: REHABILITATION | Facility: HOSPITAL | Age: 78
End: 2023-02-15
Payer: MEDICARE

## 2023-02-15 DIAGNOSIS — R26.89 IMPAIRED GAIT AND MOBILITY: Primary | ICD-10-CM

## 2023-02-15 PROCEDURE — 97110 THERAPEUTIC EXERCISES: CPT | Mod: PN

## 2023-02-15 NOTE — PROGRESS NOTES
"[facility]   Physical Therapy Treatment Note     Name: Rod De La Paz  Clinic Number: [id number]    Therapy Diagnosis:   Encounter Diagnosis   Name Primary?    Impaired gait and mobility Yes     Physician: Alton Moeller    Visit Date: 2/15/2023    Physician Orders: PT Eval and Treat   Medical Diagnosis from Referral: Lumbar Radiculopathy - LLE pain   Evaluation Date: 1/3/2023  Authorization Period Expiration: 12/31/2023  Plan of Care Expiration: 3/31/2023  Visit # / Visits authorized: 12 / 12  FOTO Visit #:  1/3    PTA Visit #: 0/5     Time In:  10:00  am   Time Out: 11:00   am    Total Billable Time: 50 minutes    SUBJECTIVE     Pt reports:  Doing well; no new complaints. Saw the pain management MD the other day for F/U; advised to continue with his therapy as it was working.   He was compliant with home exercise program.  Response to previous treatment: improved mobility, less pain;   Functional change: walking better today; less antalgia in gait today;    Pain: 1/10   Location: left hip/LE      OBJECTIVE     Objective Measures updated at progress report unless specified.     Treatment     Rod received the treatments listed below:      therapeutic exercises to develop strength, ROM, flexibility, posture, and core stabilization for 35 minutes including:  Recumbent bike - Level 8 x 13  mins   S/L left/right hip abduction x 15  S/L clams - black band around thighs  Bridges - black band  around thighs x 10   Pelvic tilts x 10 - activate TrAbs first then posterior tilt; palms push down on table for increased core activation.   Activation of T/A with marching x 10   Quadruped alternate arm and leg x 10   Quadruped - neutral spine into posterior pelvic tilt x 6 with 5" hold   3-way hip - blue tband   Standing: - TKE on 4"step - front and lateral x 20   Standing: backwards step-up onto 6" step x 20  Standing: squats x 20 - holding orange ball   Cable Column:   Paloff presses with " "rotation - 10#   High > Low - 13#   Low > High - 13#   Scapular retraction - rows 13# - alternate lead foot x 20 each     Psoas strengthen- SLS with opposite foot on 12"stool - lift foot off stool - past 90 deg hip flexion and hold x 5 - toe tap to bench and repeat x 10 each leg - 2 sets of 10       manual therapy techniques: Joint mobilizations and Soft tissue Mobilization were applied to the: Left hip  for 5 minutes, including:  Left hip - long axis distraction with end range hold; posterior capsule stretch; Inferior glide of femur in acetabulum; prone - anterior gliding to stretch anterior capsule.     Neuromuscular re-education activities to improve:  for  minutes. The following activities were included:      therapeutic activities to improve functional performance for   minutes, including:      gait training to improve functional mobility and safety for   minutes, including:        Patient Education and Home Exercises     Home Exercises Provided and Patient Education Provided     Education provided:   - Hip ROM exercises     Written Home Exercises Provided: yes. Exercises were reviewed and Rod was able to demonstrate them prior to the end of the session.  Rod demonstrated good  understanding of the education provided. See EMR under Patient Instructions for exercises provided during therapy sessions    ASSESSMENT     Rod continues to do well; Increased level of exercise for more core engagement. Has not had any return of lower back pain since [place];   Rod Is progressing well towards his goals.   Pt prognosis is Good.     Pt will continue to benefit from skilled outpatient physical therapy to address the deficits listed in the problem list box on initial evaluation, provide pt/family education and to maximize pt's level of independence in the home and community environment.     Pt's spiritual, cultural and educational needs considered and pt agreeable to plan of care and goals.     Anticipated barriers to " physical therapy: radiation schedule   Goals:  Long Term Goals: 6 weeks   Maintain left hip and lower back pain to no more than 2/10 with return to golfing and usual daily activities   Able to walk several blocks without increased pain or compensation   Able to get shoes/socks on left LE without increased pain or compensation   Able to return to golf - able to shift weight from right to left during swing without buckling of knee.   Improve limitation score on FOTO to </= 27%   Independent with HEP for continued improvement in functional mobility            PLAN     Continue with Skilled physical Therapy per POC     Rose Ballesteros, PT

## 2023-02-20 ENCOUNTER — CLINICAL SUPPORT (OUTPATIENT)
Dept: REHABILITATION | Facility: HOSPITAL | Age: 78
End: 2023-02-20
Payer: MEDICARE

## 2023-02-20 DIAGNOSIS — R26.89 IMPAIRED GAIT AND MOBILITY: Primary | ICD-10-CM

## 2023-02-20 PROCEDURE — 97110 THERAPEUTIC EXERCISES: CPT | Mod: PN

## 2023-02-20 PROCEDURE — 97140 MANUAL THERAPY 1/> REGIONS: CPT | Mod: PN

## 2023-02-20 NOTE — PROGRESS NOTES
"[facility]   Physical Therapy Treatment Note     Name: Rod De La Paz  Clinic Number: [id number]    Therapy Diagnosis:   Encounter Diagnosis   Name Primary?    Impaired gait and mobility Yes     Physician: Alton Moeller    Visit Date: 2/20/2023    Physician Orders: PT Eval and Treat   Medical Diagnosis from Referral: Lumbar Radiculopathy - LLE pain   Evaluation Date: 1/3/2023  Authorization Period Expiration: 12/31/2023  Plan of Care Expiration: 3/31/2023  Visit # / Visits authorized: 13   FOTO Visit #:  2/3    PTA Visit #: 0/5     Time In:  10:00  am   Time Out: 11:00  am    Total Billable Time: 55 minutes    SUBJECTIVE     Pt reports:  Doing well; no new complaints. Played golf yesterday - some soreness in back after 5 hours, but nothing more.   He was compliant with home exercise program.  Response to previous treatment: improved mobility, less pain;   Functional change: walking better today;    Pain: 1/10   Location: left hip/LE      OBJECTIVE     Objective Measures updated at progress report unless specified.     Treatment     Rod received the treatments listed below:      therapeutic exercises to develop strength, ROM, flexibility, posture, and core stabilization for 45 minutes including:  Recumbent bike - Level 8 x 13  mins   S/L left/right hip abduction x 15  S/L clams - black band around thighs  Bridges - black band  around thighs x 10   Pelvic tilts x 10 - activate TrAbs first then posterior tilt; palms push down on table for increased core activation.   Activation of T/A with marching x 10   Quadruped alternate arm and leg x 10   Quadruped - neutral spine into posterior pelvic tilt x 6 with 5" hold   3-way hip - blue tband   Standing: - TKE on 4"step - front and lateral x 20   Standing: backwards step-up onto 6" step x 20  Standing: squats x 20 - holding orange ball   Cable Column:   Paloff presses with rotation - 10#   High > Low - 13#   Low > High - 13#   Scapular " "retraction - rows 13# - alternate lead foot x 20 each     Psoas strengthen- SLS with opposite foot on 12"stool - lift foot off stool - past 90 deg hip flexion and hold x 5 - toe tap to bench and repeat x 10 each leg - 2 sets of 10       manual therapy techniques: Joint mobilizations and Soft tissue Mobilization were applied to the: Left hip  for 12 minutes, including:  Left hip - long axis distraction with end range hold; posterior capsule stretch; Inferior glide of femur in acetabulum; prone - anterior gliding to stretch anterior capsule. Prone - mobilization of lumbar spine, caudal mobilization of sacrum to address flexed spine segments.     Neuromuscular re-education activities to improve:  for  minutes. The following activities were included:      therapeutic activities to improve functional performance for   minutes, including:      gait training to improve functional mobility and safety for   minutes, including:        Patient Education and Home Exercises     Home Exercises Provided and Patient Education Provided     Education provided:   - Hip ROM exercises     Written Home Exercises Provided: yes. Exercises were reviewed and Rod was able to demonstrate them prior to the end of the session.  Rod demonstrated good  understanding of the education provided. See EMR under Patient Instructions for exercises provided during therapy sessions    ASSESSMENT     Rod continues to do well; Increased level of exercise for more core engagement. Has not had any return of lower back pain since [place];   Rod Is progressing well towards his goals.   Pt prognosis is Good.     Pt will continue to benefit from skilled outpatient physical therapy to address the deficits listed in the problem list box on initial evaluation, provide pt/family education and to maximize pt's level of independence in the home and community environment.     Pt's spiritual, cultural and educational needs considered and pt agreeable to plan of care " and goals.     Anticipated barriers to physical therapy: radiation schedule   Goals:  Long Term Goals: 6 weeks   Maintain left hip and lower back pain to no more than 2/10 with return to golfing and usual daily activities   Able to walk several blocks without increased pain or compensation   Able to get shoes/socks on left LE without increased pain or compensation   Able to return to golf - able to shift weight from right to left during swing without buckling of knee.   Improve limitation score on FOTO to </= 27%   Independent with HEP for continued improvement in functional mobility            PLAN     Continue with Skilled physical Therapy per POC     Rose Ballesteros, PT

## 2023-02-22 ENCOUNTER — CLINICAL SUPPORT (OUTPATIENT)
Dept: REHABILITATION | Facility: HOSPITAL | Age: 78
End: 2023-02-22
Payer: MEDICARE

## 2023-02-22 DIAGNOSIS — R26.89 IMPAIRED GAIT AND MOBILITY: Primary | ICD-10-CM

## 2023-02-22 PROCEDURE — 97110 THERAPEUTIC EXERCISES: CPT | Mod: PN

## 2023-02-22 PROCEDURE — 97140 MANUAL THERAPY 1/> REGIONS: CPT | Mod: PN

## 2023-02-22 NOTE — PROGRESS NOTES
"[facility]   Physical Therapy Treatment Note     Name: Rod De La Paz  Clinic Number: [id number]    Therapy Diagnosis:   Encounter Diagnosis   Name Primary?    Impaired gait and mobility Yes     Physician: Alton Moeller    Visit Date: 2/22/2023    Physician Orders: PT Eval and Treat   Medical Diagnosis from Referral: Lumbar Radiculopathy - LLE pain   Evaluation Date: 1/3/2023  Authorization Period Expiration: 12/31/2023  Plan of Care Expiration: 3/31/2023  Visit # / Visits authorized: 13   FOTO Visit #:  2/3    PTA Visit #: 0/5     Time In:  10:00  am   Time Out: 11:00   am    Total Billable Time: 55 minutes    SUBJECTIVE     Pt reports:  Doing well; no new complaints. Half way through his radiation tx's.   He was compliant with home exercise program.  Response to previous treatment: improved mobility, less pain;   Functional change: walking better today;    Pain: 1/10   Location: left hip/LE      OBJECTIVE     Objective Measures updated at progress report unless specified.     Treatment     Rod received the treatments listed below:      therapeutic exercises to develop strength, ROM, flexibility, posture, and core stabilization for 45 minutes including:  Recumbent bike - Level 8 x 13  mins   S/L left/right hip abduction x 15  S/L clams - black band around thighs  Bridges - black band  around thighs x 10   Pelvic tilts x 10 - activate TrAbs first then posterior tilt; palms push down on table for increased core activation.   Activation of T/A with marching x 10   Quadruped alternate arm and leg x 10   Quadruped - neutral spine into posterior pelvic tilt x 6 with 5" hold   3-way hip - blue tband   Standing: - TKE on 4"step - front and lateral x 20   Standing: backwards step-up onto 6" step x 20  Standing: squats x 20 - holding orange ball   Cable Column:   Paloff presses with rotation - 10#   High > Low - 13#   Low > High - 13#   Scapular retraction - rows 13# - alternate lead " "foot x 20 each     Psoas strengthen- SLS with opposite foot on 12"stool - lift foot off stool - past 90 deg hip flexion and hold x 5 - toe tap to bench and repeat x 10 each leg - 2 sets of 10       manual therapy techniques: Joint mobilizations and Soft tissue Mobilization were applied to the: Left hip  for 12 minutes, including:  Left hip - long axis distraction with end range hold; posterior capsule stretch; Inferior glide of femur in acetabulum; prone - anterior gliding to stretch anterior capsule. Prone - mobilization of lumbar spine, caudal mobilization of sacrum to address flexed spine segments.     Neuromuscular re-education activities to improve:  for  minutes. The following activities were included:      therapeutic activities to improve functional performance for   minutes, including:      gait training to improve functional mobility and safety for   minutes, including:        Patient Education and Home Exercises     Home Exercises Provided and Patient Education Provided     Education provided:   - Hip ROM exercises     Written Home Exercises Provided: yes. Exercises were reviewed and Rod was able to demonstrate them prior to the end of the session.  Rod demonstrated good  understanding of the education provided. See EMR under Patient Instructions for exercises provided during therapy sessions    ASSESSMENT     Rod continues to do well; Increased level of exercise for more core engagement. Has not had any return of lower back pain since [place];   Rod Is progressing well towards his goals.   Pt prognosis is Good.     Pt will continue to benefit from skilled outpatient physical therapy to address the deficits listed in the problem list box on initial evaluation, provide pt/family education and to maximize pt's level of independence in the home and community environment.     Pt's spiritual, cultural and educational needs considered and pt agreeable to plan of care and goals.     Anticipated barriers to " physical therapy: radiation schedule   Goals:  Long Term Goals: 6 weeks   Maintain left hip and lower back pain to no more than 2/10 with return to golfing and usual daily activities   Able to walk several blocks without increased pain or compensation   Able to get shoes/socks on left LE without increased pain or compensation   Able to return to golf - able to shift weight from right to left during swing without buckling of knee.   Improve limitation score on FOTO to </= 27%   Independent with HEP for continued improvement in functional mobility            PLAN     Continue with Skilled physical Therapy per POC     Rose Ballesteros, PT

## 2023-03-01 ENCOUNTER — CLINICAL SUPPORT (OUTPATIENT)
Dept: REHABILITATION | Facility: HOSPITAL | Age: 78
End: 2023-03-01
Payer: MEDICARE

## 2023-03-01 DIAGNOSIS — R26.89 IMPAIRED GAIT AND MOBILITY: Primary | ICD-10-CM

## 2023-03-01 PROCEDURE — 97110 THERAPEUTIC EXERCISES: CPT | Mod: PN

## 2023-03-01 NOTE — PROGRESS NOTES
"[facility]   Physical Therapy Treatment Note     Name: Rod De La Paz  Clinic Number: [id number]    Therapy Diagnosis:   Encounter Diagnosis   Name Primary?    Impaired gait and mobility Yes     Physician:    Visit Date: 3/1/2023    Physician Orders: PT Eval and Treat   Medical Diagnosis from Referral: Lumbar Radiculopathy - LLE pain   Evaluation Date: 1/3/2023  Authorization Period Expiration: 12/31/2023  Plan of Care Expiration: 3/31/2023  Visit # / Visits authorized: 15  FOTO Visit #:  2/3    PTA Visit #: 0/5     Time In:  11:00  am   Time Out: 12:00  am    Total Billable Time: 55 minutes    SUBJECTIVE     Pt reports:  Doing well; no new complaints. Starting second half of radiation tx where cone is narrower - more intense in prostate bed. Stated  he continues to feel ok - just some fatigue.   He was compliant with home exercise program.  Response to previous treatment: improved mobility, legs feel stronger   Functional change:     Pain: 1/10   Location: left hip/LE      OBJECTIVE     Objective Measures updated at progress report unless specified.     Treatment     Rod received the treatments listed below:      therapeutic exercises to develop strength, ROM, flexibility, posture, and core stabilization for 45 minutes including:  Recumbent bike - Level 8 x 13  mins   S/L left/right hip abduction x 15  S/L clams - black band around thighs  Bridges - black band  around thighs x 10   Pelvic tilts x 10 - activate TrAbs first then posterior tilt; palms push down on table for increased core activation.   Activation of T/A with marching x 10   Quadruped alternate arm and leg x 10   Quadruped - neutral spine into posterior pelvic tilt x 6 with 5" hold   3-way hip - blue tband   Standing - split quat lunges with hand assist on   Standing: - TKE on 4"step - front and lateral x 20   Standing: backwards step-up onto 6" step x 20  Standing: squats x 20 - holding orange ball   Cable Column:   Paloff " "presses with rotation - 10#   High > Low - 13#   Low > High - 13#   Scapular retraction - rows 13# - alternate lead foot x 20 each     Psoas strengthen- SLS with opposite foot on 12"stool - lift foot off stool - past 90 deg hip flexion and hold x 5 - toe tap to bench and repeat x 10 each leg - 2 sets of 10       manual therapy techniques: Joint mobilizations and Soft tissue Mobilization were applied to the: Left hip  for 12 minutes, including:  Left hip - long axis distraction with end range hold; posterior capsule stretch; Inferior glide of femur in acetabulum; prone - anterior gliding to stretch anterior capsule. Prone - mobilization of lumbar spine, caudal mobilization of sacrum to address flexed spine segments.     Neuromuscular re-education activities to improve:  for  minutes. The following activities were included:      therapeutic activities to improve functional performance for   minutes, including:      gait training to improve functional mobility and safety for   minutes, including:        Patient Education and Home Exercises     Home Exercises Provided and Patient Education Provided     Education provided:   - Hip ROM exercises     Written Home Exercises Provided: yes. Exercises were reviewed and Rod was able to demonstrate them prior to the end of the session.  Rod demonstrated good  understanding of the education provided. See EMR under Patient Instructions for exercises provided during therapy sessions    ASSESSMENT     Rod continues to do well; Increased level of exercise for more core engagement. Has not had any return of lower back pain since [place];   Rod Is progressing well towards his goals.   Pt prognosis is Good.     Pt will continue to benefit from skilled outpatient physical therapy to address the deficits listed in the problem list box on initial evaluation, provide pt/family education and to maximize pt's level of independence in the home and community environment.     Pt's spiritual, " cultural and educational needs considered and pt agreeable to plan of care and goals.     Anticipated barriers to physical therapy: radiation schedule   Goals:  Long Term Goals: 6 weeks   Maintain left hip and lower back pain to no more than 2/10 with return to golfing and usual daily activities   Able to walk several blocks without increased pain or compensation   Able to get shoes/socks on left LE without increased pain or compensation   Able to return to golf - able to shift weight from right to left during swing without buckling of knee.   Improve limitation score on FOTO to </= 27%   Independent with HEP for continued improvement in functional mobility            PLAN     Continue with Skilled physical Therapy per POC     Rose Ballesteros, PT

## 2023-03-06 ENCOUNTER — CLINICAL SUPPORT (OUTPATIENT)
Dept: REHABILITATION | Facility: HOSPITAL | Age: 78
End: 2023-03-06
Payer: MEDICARE

## 2023-03-06 DIAGNOSIS — R26.89 IMPAIRED GAIT AND MOBILITY: Primary | ICD-10-CM

## 2023-03-06 PROCEDURE — 97140 MANUAL THERAPY 1/> REGIONS: CPT | Mod: PN

## 2023-03-06 PROCEDURE — 97110 THERAPEUTIC EXERCISES: CPT | Mod: PN

## 2023-03-06 NOTE — PROGRESS NOTES
"[facility]   Physical Therapy Treatment Note     Name: Rod De La Paz  Clinic Number: [id number]    Therapy Diagnosis:   Encounter Diagnosis   Name Primary?    Impaired gait and mobility Yes     Physician:    Visit Date: 3/6/2023    Physician Orders: PT Eval and Treat   Medical Diagnosis from Referral: Lumbar Radiculopathy - LLE pain   Evaluation Date: 1/3/2023  Authorization Period Expiration: 12/31/2023  Plan of Care Expiration: 3/31/2023  Visit # / Visits authorized: 16  FOTO Visit #:  2/3    PTA Visit #: 0/5     Time In:  10:00  am   Time Out: 11:00    am    Total Billable Time: 55 minutes    SUBJECTIVE     Pt reports:  Doing well; no new complaints.   He was compliant with home exercise program.  Response to previous treatment: improved mobility, legs feel stronger   Functional change: continues to perform usual activities without difficulty     Pain: 1/10   Location: left hip/LE      OBJECTIVE     Objective Measures updated at progress report unless specified.     Treatment     Rod received the treatments listed below:      therapeutic exercises to develop strength, ROM, flexibility, posture, and core stabilization for 45 minutes including:  Recumbent bike - Level 8 x 13  mins   S/L left/right hip abduction x 15  S/L clams - black band around thighs  Bridges - black band  around thighs x 10   Pelvic tilts x 10 - activate TrAbs first then posterior tilt; palms push down on table for increased core activation.   Activation of T/A with marching x 10   Quadruped alternate arm and leg x 10   Quadruped - neutral spine into posterior pelvic tilt x 6 with 5" hold   3-way hip - blue tband   Standing - split quat lunges with hand assist on   Standing: - TKE on 4"step - front and lateral x 20   Standing: backwards step-up onto 6" step x 20  Standing: squats x 20 - holding orange ball   Cable Column:   Paloff presses with rotation - 10#   High > Low - 13#   Low > High - 13#   Scapular retraction " "- rows 13# - alternate lead foot x 20 each     Psoas strengthen- SLS with opposite foot on 12"stool - lift foot off stool - past 90 deg hip flexion and hold x 5 - toe tap to bench and repeat x 10 each leg - 2 sets of 10       manual therapy techniques: Joint mobilizations and Soft tissue Mobilization were applied to the: Left hip  for 12 minutes, including:  Left hip - long axis distraction with end range hold; posterior capsule stretch; Inferior glide of femur in acetabulum; prone - anterior gliding to stretch anterior capsule. Prone - mobilization of lumbar spine, caudal mobilization of sacrum to address flexed spine segments.     Neuromuscular re-education activities to improve:  for  minutes. The following activities were included:      therapeutic activities to improve functional performance for   minutes, including:      gait training to improve functional mobility and safety for   minutes, including:        Patient Education and Home Exercises     Home Exercises Provided and Patient Education Provided     Education provided:   - Hip ROM exercises     Written Home Exercises Provided: yes. Exercises were reviewed and Rod was able to demonstrate them prior to the end of the session.  Rod demonstrated good  understanding of the education provided. See EMR under Patient Instructions for exercises provided during therapy sessions    ASSESSMENT     Rod continues to do well; Increased level of exercise for more core engagement. Has not had any return of lower back pain since [place];   Rod Is progressing well towards his goals.   Pt prognosis is Good.     Pt will continue to benefit from skilled outpatient physical therapy to address the deficits listed in the problem list box on initial evaluation, provide pt/family education and to maximize pt's level of independence in the home and community environment.     Pt's spiritual, cultural and educational needs considered and pt agreeable to plan of care and goals.   "   Anticipated barriers to physical therapy: radiation schedule   Goals:  Long Term Goals: 6 weeks   Maintain left hip and lower back pain to no more than 2/10 with return to golfing and usual daily activities   Able to walk several blocks without increased pain or compensation   Able to get shoes/socks on left LE without increased pain or compensation   Able to return to golf - able to shift weight from right to left during swing without buckling of knee.   Improve limitation score on FOTO to </= 27%   Independent with HEP for continued improvement in functional mobility            PLAN     Continue with Skilled physical Therapy per POC     Rose Ballesteros, PT

## 2023-03-08 ENCOUNTER — CLINICAL SUPPORT (OUTPATIENT)
Dept: REHABILITATION | Facility: HOSPITAL | Age: 78
End: 2023-03-08
Payer: MEDICARE

## 2023-03-08 DIAGNOSIS — R26.89 IMPAIRED GAIT AND MOBILITY: Primary | ICD-10-CM

## 2023-03-08 PROCEDURE — 97140 MANUAL THERAPY 1/> REGIONS: CPT | Mod: PN

## 2023-03-08 PROCEDURE — 97110 THERAPEUTIC EXERCISES: CPT | Mod: PN

## 2023-03-08 NOTE — PROGRESS NOTES
"[facility]   Physical Therapy Treatment Note     Name: Rod De La Paz  Clinic Number: [id number]    Therapy Diagnosis:   Encounter Diagnosis   Name Primary?    Impaired gait and mobility Yes     Physician:    Visit Date: 3/8/2023    Physician Orders: PT Eval and Treat   Medical Diagnosis from Referral: Lumbar Radiculopathy - LLE pain   Evaluation Date: 1/3/2023  Authorization Period Expiration: 12/31/2023  Plan of Care Expiration: 3/31/2023  Visit # / Visits authorized: 17  FOTO Visit #:  2/3    PTA Visit #: 0/5     Time In:  1:00  pm   Time Out: 2:00   pm   Total Billable Time: 55 minutes    SUBJECTIVE     Pt reports:  Doing well; no new complaints.   He was compliant with home exercise program.  Response to previous treatment: improved mobility, legs feel stronger   Functional change: continues to perform usual activities without difficulty     Pain: 1/10   Location: left hip/LE      OBJECTIVE     Objective Measures updated at progress report unless specified.     Treatment     Rod received the treatments listed below:      therapeutic exercises to develop strength, ROM, flexibility, posture, and core stabilization for 45 minutes including:  Recumbent bike - Level 8 x 13  mins   SLR - 2# x 15 - slow controlled motion up/down   SAQ - 7# small gray roll   S/L left/right hip abduction x 15  S/L clams - black band around thighs  Bridges - black band  around thighs x 10   Pelvic tilts x 10 - activate TrAbs first then posterior tilt; palms push down on table for increased core activation.   Activation of T/A with marching x 10   Quadruped alternate arm and leg x 10   Quadruped - neutral spine into posterior pelvic tilt x 6 with 5" hold   3-way hip - blue tband   Standing - split quat lunges with hand assist on   Standing: - TKE on 6"step - front and lateral x 20   Standing: backwards step-up onto 6" step x 20  Standing: squats x 20 - holding orange ball   Cable Column:   Marianff presses with " "rotation - 10#   High > Low - 13#   Low > High - 13#   Scapular retraction - rows 13# - alternate lead foot x 20 each     Psoas strengthen- SLS with opposite foot on 12"stool - lift foot off stool - past 90 deg hip flexion and hold x 5 - toe tap to bench and repeat x 10 each leg - 2 sets of 10       manual therapy techniques: Joint mobilizations and Soft tissue Mobilization were applied to the: Left hip  for 10  minutes, including:  Left hip - long axis distraction with end range hold; posterior capsule stretch; Inferior glide of femur in acetabulum; prone - anterior gliding to stretch anterior capsule. Prone - mobilization of lumbar spine, caudal mobilization of sacrum to address flexed spine segments.     Neuromuscular re-education activities to improve:  for  minutes. The following activities were included:      therapeutic activities to improve functional performance for   minutes, including:      gait training to improve functional mobility and safety for   minutes, including:        Patient Education and Home Exercises     Home Exercises Provided and Patient Education Provided     Education provided:   - Hip ROM exercises     Written Home Exercises Provided: yes. Exercises were reviewed and Rod was able to demonstrate them prior to the end of the session.  Rod demonstrated good  understanding of the education provided. See EMR under Patient Instructions for exercises provided during therapy sessions    ASSESSMENT     Rod continues to do well; Increased level of exercise for more core engagement. Has not had any return of lower back pain since [place];   Rod Is progressing well towards his goals.   Pt prognosis is Good.     Pt will continue to benefit from skilled outpatient physical therapy to address the deficits listed in the problem list box on initial evaluation, provide pt/family education and to maximize pt's level of independence in the home and community environment.     Pt's spiritual, cultural " and educational needs considered and pt agreeable to plan of care and goals.     Anticipated barriers to physical therapy: radiation schedule   Goals:  Long Term Goals: 6 weeks   Maintain left hip and lower back pain to no more than 2/10 with return to golfing and usual daily activities   Able to walk several blocks without increased pain or compensation   Able to get shoes/socks on left LE without increased pain or compensation   Able to return to golf - able to shift weight from right to left during swing without buckling of knee.   Improve limitation score on FOTO to </= 27%   Independent with HEP for continued improvement in functional mobility            PLAN     Continue with Skilled physical Therapy per POC     Rose Ballesteros, PT

## 2023-03-13 ENCOUNTER — CLINICAL SUPPORT (OUTPATIENT)
Dept: REHABILITATION | Facility: HOSPITAL | Age: 78
End: 2023-03-13
Payer: MEDICARE

## 2023-03-13 DIAGNOSIS — R26.89 IMPAIRED GAIT AND MOBILITY: Primary | ICD-10-CM

## 2023-03-13 PROCEDURE — 97110 THERAPEUTIC EXERCISES: CPT | Mod: PN

## 2023-03-13 PROCEDURE — 97140 MANUAL THERAPY 1/> REGIONS: CPT | Mod: PN

## 2023-03-13 NOTE — PROGRESS NOTES
"[facility]   Physical Therapy Treatment Note     Name: Rod De La Paz  Clinic Number: [id number]    Therapy Diagnosis:   Encounter Diagnosis   Name Primary?    Impaired gait and mobility Yes     Physician:    Visit Date: 3/13/2023    Physician Orders: PT Eval and Treat   Medical Diagnosis from Referral: Lumbar Radiculopathy - LLE pain   Evaluation Date: 1/3/2023  Authorization Period Expiration: 12/31/2023  Plan of Care Expiration: 3/31/2023  Visit # / Visits authorized: 18   FOTO Visit #:  2/3    PTA Visit #: 0/5     Time In:  10:00 am   Time Out: 11:00 am    Total Billable Time: 55 minutes    SUBJECTIVE     Pt reports:  Doing well; no new complaints. Only 3 more radiation treatments left   He was compliant with home exercise program.  Response to previous treatment: improved mobility, legs feel stronger   Functional change: continues to perform usual activities without difficulty     Pain: 1/10   Location: left hip/LE      OBJECTIVE     Objective Measures updated at progress report unless specified.     Treatment     Rod received the treatments listed below:      therapeutic exercises to develop strength, ROM, flexibility, posture, and core stabilization for 45 minutes including:  Recumbent bike - Level 8 x 13  mins   SLR - 2# x 15 - slow controlled motion up/down   SAQ - 7# small gray roll   S/L left/right hip abduction x 15  S/L clams - black band around thighs  Bridges - black band  around thighs x 10   Pelvic tilts x 10 - activate TrAbs first then posterior tilt; palms push down on table for increased core activation.   Activation of T/A with marching x 10   Quadruped alternate arm and leg x 10   Quadruped - neutral spine into posterior pelvic tilt x 6 with 5" hold   3-way hip - blue tband   Standing - split quat lunges with hand assist on   Standing: - TKE on 6"step - front and lateral x 20   Standing: backwards step-up onto 6" step x 20  Standing: squats x 20 - holding orange ball " "  Cable Column:   Paloff presses with rotation - 10#   High > Low - 13#   Low > High - 13#   Scapular retraction - rows 13# - alternate lead foot x 20 each     Psoas strengthen- SLS with opposite foot on 12"stool - lift foot off stool - past 90 deg hip flexion and hold x 5 - toe tap to bench and repeat x 10 each leg        manual therapy techniques: Joint mobilizations and Soft tissue Mobilization were applied to the: Left hip  for 10  minutes, including:  Left hip - long axis distraction with end range hold; posterior capsule stretch; Inferior glide of femur in acetabulum; prone - anterior gliding to stretch anterior capsule. Prone - mobilization of lumbar spine, caudal mobilization of sacrum to address flexed spine segments.     Neuromuscular re-education activities to improve:  for  minutes. The following activities were included:      therapeutic activities to improve functional performance for   minutes, including:      gait training to improve functional mobility and safety for   minutes, including:        Patient Education and Home Exercises     Home Exercises Provided and Patient Education Provided     Education provided:   - Hip ROM exercises     Written Home Exercises Provided: yes. Exercises were reviewed and Rod was able to demonstrate them prior to the end of the session.  Rod demonstrated good  understanding of the education provided. See EMR under Patient Instructions for exercises provided during therapy sessions    ASSESSMENT     Rod continues to do well; Increased level of exercise for more core engagement. Has not had any return of lower back pain since [place];   Rod Is progressing well towards his goals.   Pt prognosis is Good.     Pt will continue to benefit from skilled outpatient physical therapy to address the deficits listed in the problem list box on initial evaluation, provide pt/family education and to maximize pt's level of independence in the home and community environment. "     Pt's spiritual, cultural and educational needs considered and pt agreeable to plan of care and goals.     Anticipated barriers to physical therapy: radiation schedule   Goals:  Long Term Goals: 6 weeks   Maintain left hip and lower back pain to no more than 2/10 with return to golfing and usual daily activities   Able to walk several blocks without increased pain or compensation   Able to get shoes/socks on left LE without increased pain or compensation   Able to return to golf - able to shift weight from right to left during swing without buckling of knee.   Improve limitation score on FOTO to </= 27%   Independent with HEP for continued improvement in functional mobility            PLAN     Continue with Skilled physical Therapy per POC     Rose Ballesteros, PT

## 2023-03-22 ENCOUNTER — CLINICAL SUPPORT (OUTPATIENT)
Dept: REHABILITATION | Facility: HOSPITAL | Age: 78
End: 2023-03-22
Payer: MEDICARE

## 2023-03-22 DIAGNOSIS — R26.89 IMPAIRED GAIT AND MOBILITY: Primary | ICD-10-CM

## 2023-03-22 PROCEDURE — 97140 MANUAL THERAPY 1/> REGIONS: CPT | Mod: PN

## 2023-03-22 PROCEDURE — 97110 THERAPEUTIC EXERCISES: CPT | Mod: PN

## 2023-03-22 NOTE — PROGRESS NOTES
"[facility]   Physical Therapy Treatment Note     Name: Rod De La Paz  Clinic Number: [id number]    Therapy Diagnosis:   Encounter Diagnosis   Name Primary?    Impaired gait and mobility Yes     Physician: Alton Moeller MD     Visit Date: 3/22/2023    Physician Orders: PT Eval and Treat   Medical Diagnosis from Referral: Lumbar Radiculopathy - LLE pain   Evaluation Date: 1/3/2023  Authorization Period Expiration: 12/31/2023  Plan of Care Expiration: 3/31/2023  Visit # / Visits authorized: 18   FOTO Visit #:  2/3    PTA Visit #: 0/5     Time In:  1:00 pm   Time Out: 2:00    Total Billable Time:  55 minutes    SUBJECTIVE     Pt reports:  Doing well; no new complaints   He was compliant with home exercise program.  Response to previous treatment: improved mobility, legs feel stronger   Functional change: continues to perform usual activities without difficulty     Pain: 1/10   Location: left hip/LE      OBJECTIVE     Objective Measures updated at progress report unless specified.     Treatment     Rod received the treatments listed below:      therapeutic exercises to develop strength, ROM, flexibility, posture, and core stabilization for 45 minutes including:  Recumbent bike - Level 8 x 13  mins   SLR - 2# x 15 - slow controlled motion up/down   SAQ - 7# small gray roll   S/L left/right hip abduction x 15  S/L clams - black band around thighs  Bridges - black band  around thighs x 10   Pelvic tilts x 10 - activate TrAbs first then posterior tilt; palms push down on table for increased core activation.   Activation of T/A with marching x 10   Quadruped alternate arm and leg x 10   Quadruped - neutral spine into posterior pelvic tilt x 6 with 5" hold   3-way hip - blue tband   Standing - split quat lunges with hand assist on   Standing: - TKE on 6"step - front and lateral x 20   Standing: backwards step-up onto 6" step x 20  Standing: squats x 20 - holding orange ball   Cable Column: " "  Paloff presses with rotation - 10#   High > Low - 13#   Low > High - 13#   Scapular retraction - rows 13# - alternate lead foot x 20 each     Psoas strengthen- SLS with opposite foot on 12"stool - lift foot off stool - past 90 deg hip flexion and hold x 5 - toe tap to bench and repeat x 10 each leg        manual therapy techniques: Joint mobilizations and Soft tissue Mobilization were applied to the: Left hip  for 10  minutes, including:  Left hip - long axis distraction with end range hold; posterior capsule stretch; Inferior glide of femur in acetabulum; prone - anterior gliding to stretch anterior capsule. Prone - mobilization of lumbar spine, caudal mobilization of sacrum to address flexed spine segments.     Neuromuscular re-education activities to improve:  for  minutes. The following activities were included:      therapeutic activities to improve functional performance for   minutes, including:      gait training to improve functional mobility and safety for   minutes, including:        Patient Education and Home Exercises     Home Exercises Provided and Patient Education Provided     Education provided:   - Hip ROM exercises     Written Home Exercises Provided: yes. Exercises were reviewed and Rod was able to demonstrate them prior to the end of the session.  Rod demonstrated good  understanding of the education provided. See EMR under Patient Instructions for exercises provided during therapy sessions    ASSESSMENT     Rod continues to do well; Increased level of exercise for more core engagement. Has not had any return of lower back pain since [place];   Rod Is progressing well towards his goals.   Pt prognosis is Good.     Pt will continue to benefit from skilled outpatient physical therapy to address the deficits listed in the problem list box on initial evaluation, provide pt/family education and to maximize pt's level of independence in the home and community environment.     Pt's spiritual, " cultural and educational needs considered and pt agreeable to plan of care and goals.     Anticipated barriers to physical therapy: radiation schedule   Goals:  Long Term Goals: 6 weeks   Maintain left hip and lower back pain to no more than 2/10 with return to golfing and usual daily activities   Able to walk several blocks without increased pain or compensation   Able to get shoes/socks on left LE without increased pain or compensation   Able to return to golf - able to shift weight from right to left during swing without buckling of knee.   Improve limitation score on FOTO to </= 27%   Independent with HEP for continued improvement in functional mobility            PLAN     Continue with Skilled physical Therapy per POC     Rose Ballesteros, PT

## 2023-03-27 ENCOUNTER — CLINICAL SUPPORT (OUTPATIENT)
Dept: REHABILITATION | Facility: HOSPITAL | Age: 78
End: 2023-03-27
Payer: MEDICARE

## 2023-03-27 DIAGNOSIS — R26.89 IMPAIRED GAIT AND MOBILITY: Primary | ICD-10-CM

## 2023-03-27 PROCEDURE — 97140 MANUAL THERAPY 1/> REGIONS: CPT | Mod: PN

## 2023-03-27 PROCEDURE — 97110 THERAPEUTIC EXERCISES: CPT | Mod: PN

## 2023-03-27 NOTE — PROGRESS NOTES
"[facility]   Physical Therapy Treatment Note     Name: Rod De La Paz  Clinic Number: [id number]    Therapy Diagnosis:   Encounter Diagnosis   Name Primary?    Impaired gait and mobility Yes     Physician: Alton Moeller MD     Visit Date: 3/27/2023    Physician Orders: PT Eval and Treat   Medical Diagnosis from Referral: Lumbar Radiculopathy - LLE pain   Evaluation Date: 1/3/2023  Authorization Period Expiration: 12/31/2023  Plan of Care Expiration: 3/31/2023  Visit # / Visits authorized: 20   FOTO Visit #:  2/3    PTA Visit #: 0/5     Time In:  10:00am   Time Out:  11:00    Total Billable Time:  55 minutes    SUBJECTIVE     Pt reports:  Doing well; no new complaints   He was compliant with home exercise program.  Response to previous treatment: improved mobility, legs feel stronger   Functional change: continues to perform usual activities without difficulty     Pain: 1/10   Location: left hip/LE      OBJECTIVE     Objective Measures updated at progress report unless specified.     Treatment     Rod received the treatments listed below:      therapeutic exercises to develop strength, ROM, flexibility, posture, and core stabilization for 45 minutes including:  Recumbent bike - Level 8 x 13  mins   SLR - 2# x 15 - slow controlled motion up/down   SAQ - 7# small gray roll   S/L left/right hip abduction x 15 - 2#   S/L clams - black band around thighs  Bridges - black band  around thighs x 10   Pelvic tilts x 10 - activate TrAbs first then posterior tilt; palms push down on table for increased core activation.   Activation of T/A with marching x 10   Quadruped alternate arm and leg x 10   Quadruped - neutral spine into posterior pelvic tilt x 6 with 5" hold   3-way hip - blue tband   Standing - split quat lunges with hand assist on   Standing: - TKE on 6"step - front and lateral x 20   Standing: backwards step-up onto 6" step x 20  Standing: squats x 20 - holding orange ball   Psoas " "strengthen- SLS with opposite foot on 12"stool - lift foot off stool - past 90 deg hip flexion and hold x 5 - toe tap to bench and repeat x 10 each leg    Quantum:   H/S curls: 40# 10 x 3  SAQ's: 40# 10 x 3     manual therapy techniques: Joint mobilizations and Soft tissue Mobilization were applied to the: Left hip  for 10  minutes, including:  Left hip - long axis distraction with end range hold; posterior capsule stretch; Inferior glide of femur in acetabulum; prone - anterior gliding to stretch anterior capsule. Prone - mobilization of lumbar spine, caudal mobilization of sacrum to address flexed spine segments.     Neuromuscular re-education activities to improve:  for  minutes. The following activities were included:      therapeutic activities to improve functional performance for   minutes, including:      gait training to improve functional mobility and safety for   minutes, including:        Patient Education and Home Exercises     Home Exercises Provided and Patient Education Provided     Education provided:   - Hip ROM exercises     Written Home Exercises Provided: yes. Exercises were reviewed and Rod was able to demonstrate them prior to the end of the session.  Rod demonstrated good  understanding of the education provided. See EMR under Patient Instructions for exercises provided during therapy sessions    ASSESSMENT     Rod continues to do well; Increased level of exercise for more core engagement.  Antalgic gait pattern more apparent over the past week - left hip bothers him at times; Patient is now finished with his radiation - may consider hip replacement if pain gets too bad.   Rod Is progressing well towards his goals.   Pt prognosis is Good.     Pt will continue to benefit from skilled outpatient physical therapy to address the deficits listed in the problem list box on initial evaluation, provide pt/family education and to maximize pt's level of independence in the home and community " environment.     Pt's spiritual, cultural and educational needs considered and pt agreeable to plan of care and goals.     Anticipated barriers to physical therapy: radiation schedule   Goals:  Long Term Goals: 6 weeks   Maintain left hip and lower back pain to no more than 2/10 with return to golfing and usual daily activities   Able to walk several blocks without increased pain or compensation   Able to get shoes/socks on left LE without increased pain or compensation   Able to return to golf - able to shift weight from right to left during swing without buckling of knee.   Improve limitation score on FOTO to </= 27%   Independent with HEP for continued improvement in functional mobility            PLAN     Update POC and request additional visits from MD Rose Ballesteros, PT                                        [facility]   Physical Therapy Treatment Note     Name: Rod De La Paz  Clinic Number: [id number]    Therapy Diagnosis:   Encounter Diagnosis   Name Primary?    Impaired gait and mobility Yes     Physician: Alton Moeller MD     Visit Date: 3/27/2023    Physician Orders: PT Eval and Treat   Medical Diagnosis from Referral: Lumbar Radiculopathy - LLE pain   Evaluation Date: 1/3/2023  Authorization Period Expiration: 12/31/2023  Plan of Care Expiration: 3/31/2023  Visit # / Visits authorized: 18   FOTO Visit #:  2/3    PTA Visit #: 0/5     Time In:  1:00 pm   Time Out: 2:00    Total Billable Time:  55 minutes    SUBJECTIVE     Pt reports:  Doing well; no new complaints   He was compliant with home exercise program.  Response to previous treatment: improved mobility, legs feel stronger   Functional change: continues to perform usual activities without difficulty     Pain: 1/10   Location: left hip/LE      OBJECTIVE     Objective Measures updated at progress report unless specified.     Treatment     Rod received the treatments listed below:      therapeutic exercises to develop  "strength, ROM, flexibility, posture, and core stabilization for 45 minutes including:  Recumbent bike - Level 8 x 13  mins   SLR - 2# x 15 - slow controlled motion up/down   SAQ - 7# small gray roll   S/L left/right hip abduction x 15  S/L clams - black band around thighs  Bridges - black band  around thighs x 10   Pelvic tilts x 10 - activate TrAbs first then posterior tilt; palms push down on table for increased core activation.   Activation of T/A with marching x 10   Quadruped alternate arm and leg x 10   Quadruped - neutral spine into posterior pelvic tilt x 6 with 5" hold   3-way hip - blue tband   Standing - split quat lunges with hand assist on   Standing: - TKE on 6"step - front and lateral x 20   Standing: backwards step-up onto 6" step x 20  Standing: squats x 20 - holding orange ball   Cable Column:   Paloff presses with rotation - 10#   High > Low - 13#   Low > High - 13#   Scapular retraction - rows 13# - alternate lead foot x 20 each     Psoas strengthen- SLS with opposite foot on 12"stool - lift foot off stool - past 90 deg hip flexion and hold x 5 - toe tap to bench and repeat x 10 each leg        manual therapy techniques: Joint mobilizations and Soft tissue Mobilization were applied to the: Left hip  for 10  minutes, including:  Left hip - long axis distraction with end range hold; posterior capsule stretch; Inferior glide of femur in acetabulum; prone - anterior gliding to stretch anterior capsule. Prone - mobilization of lumbar spine, caudal mobilization of sacrum to address flexed spine segments.     Neuromuscular re-education activities to improve:  for  minutes. The following activities were included:      therapeutic activities to improve functional performance for   minutes, including:      gait training to improve functional mobility and safety for   minutes, including:        Patient Education and Home Exercises     Home Exercises Provided and Patient Education Provided     Education " provided:   - Hip ROM exercises     Written Home Exercises Provided: yes. Exercises were reviewed and Rod was able to demonstrate them prior to the end of the session.  Rod demonstrated good  understanding of the education provided. See EMR under Patient Instructions for exercises provided during therapy sessions    ASSESSMENT     Rod continues to do well; Increased level of exercise for more core engagement. Has not had any return of lower back pain since RAYMOND;   Rod Is progressing well towards his goals.   Pt prognosis is Good.     Pt will continue to benefit from skilled outpatient physical therapy to address the deficits listed in the problem list box on initial evaluation, provide pt/family education and to maximize pt's level of independence in the home and community environment.     Pt's spiritual, cultural and educational needs considered and pt agreeable to plan of care and goals.     Anticipated barriers to physical therapy: radiation schedule   Goals:  Long Term Goals: 6 weeks   Maintain left hip and lower back pain to no more than 2/10 with return to golfing and usual daily activities   Able to walk several blocks without increased pain or compensation   Able to get shoes/socks on left LE without increased pain or compensation   Able to return to golf - able to shift weight from right to left during swing without buckling of knee.   Improve limitation score on FOTO to </= 27%   Independent with HEP for continued improvement in functional mobility            PLAN     Continue with Skilled physical Therapy per POC     Rose Ballesteros, PT

## 2023-03-29 ENCOUNTER — CLINICAL SUPPORT (OUTPATIENT)
Dept: REHABILITATION | Facility: HOSPITAL | Age: 78
End: 2023-03-29
Payer: MEDICARE

## 2023-03-29 DIAGNOSIS — R26.89 IMPAIRED GAIT AND MOBILITY: Primary | ICD-10-CM

## 2023-03-29 PROCEDURE — 97110 THERAPEUTIC EXERCISES: CPT | Mod: PN

## 2023-03-29 NOTE — PROGRESS NOTES
"[facility]   Physical Therapy Treatment Note     Name: Rod De La Paz  Clinic Number: [id number]    Therapy Diagnosis:   Encounter Diagnosis   Name Primary?    Impaired gait and mobility Yes     Physician: Alton Moeller MD     Visit Date: 3/29/2023    Physician Orders: PT Eval and Treat   Medical Diagnosis from Referral: Lumbar Radiculopathy - LLE pain   Evaluation Date: 1/3/2023  Authorization Period Expiration: 12/31/2023  Plan of Care Expiration: 3/31/2023  Visit # / Visits authorized: 21  FOTO Visit #:  2/3    PTA Visit #: 0/5     Time In:  11:00 am   Time Out: 11:50 am     Total Billable Time:  50 minutes    SUBJECTIVE     Pt reports:  Doing well; no new complaints   He was compliant with home exercise program.  Response to previous treatment: improved mobility, legs feel stronger   Functional change: continues to perform usual activities without difficulty; started at [employer]; riding the bicycle.     Pain: 1/10   Location: left hip/LE      OBJECTIVE     Objective Measures updated at progress report unless specified.     Treatment     Rod received the treatments listed below:      therapeutic exercises to develop strength, ROM, flexibility, posture, and core stabilization for 45 minutes including:  Recumbent bike - Level 8 x 13  mins   SLR - 2# x 15 - slow controlled motion up/down   SAQ - 7# small gray roll   S/L left/right hip abduction x 15 - 2#   S/L clams - black band around thighs  Bridges - black band  around thighs x 10   Pelvic tilts x 10 - activate TrAbs first then posterior tilt; palms push down on table for increased core activation.   Activation of T/A with marching x 10   Quadruped alternate arm and leg x 10   Quadruped - neutral spine into posterior pelvic tilt x 6 with 5" hold   3-way hip - blue tband   Standing - split quat lunges with hand assist on   Standing: - TKE on 4"step - front and lateral x 20   Standing: backwards step-up onto " "6" step x 20  Standing: squats x 20 - holding orange ball   Psoas strengthen- SLS with opposite foot on 12"stool - lift foot off stool - past 90 deg hip flexion and hold x 5 - toe tap to bench and repeat x 10 each leg      DNP   manual therapy techniques: Joint mobilizations and Soft tissue Mobilization were applied to the: Left hip  for 10  minutes, including:  Left hip - long axis distraction with end range hold; posterior capsule stretch; Inferior glide of femur in acetabulum; prone - anterior gliding to stretch anterior capsule. Prone - mobilization of lumbar spine, caudal mobilization of sacrum to address flexed spine segments.     Neuromuscular re-education activities to improve:  for  minutes. The following activities were included:      therapeutic activities to improve functional performance for   minutes, including:      gait training to improve functional mobility and safety for   minutes, including:        Patient Education and Home Exercises     Home Exercises Provided and Patient Education Provided     Education provided:   - Hip ROM exercises     Written Home Exercises Provided: yes. Exercises were reviewed and Rod was able to demonstrate them prior to the end of the session.  Rod demonstrated good  understanding of the education provided. See EMR under Patient Instructions for exercises provided during therapy sessions    ASSESSMENT     Rod continues to do well; Increased level of exercise for more core engagement.  Started at [employer] gym - this should add to his P.T. and help improve overall endurance. Antalgic gait pattern more apparent over the past week - left hip bothers him at times; Patient is now finished with his radiation - may consider hip replacement if pain gets too bad.   Rod Is progressing well towards his goals.   Pt prognosis is Good.     Pt will continue to benefit from skilled outpatient physical therapy to address the deficits listed in the problem list box on initial " evaluation, provide pt/family education and to maximize pt's level of independence in the home and community environment.     Pt's spiritual, cultural and educational needs considered and pt agreeable to plan of care and goals.     Anticipated barriers to physical therapy: radiation schedule   Goals:  Long Term Goals: 6 weeks   Maintain left hip and lower back pain to no more than 2/10 with return to golfing and usual daily activities   Able to walk several blocks without increased pain or compensation   Able to get shoes/socks on left LE without increased pain or compensation   Able to return to golf - able to shift weight from right to left during swing without buckling of knee.   Improve limitation score on FOTO to </= 27%   Independent with HEP for continued improvement in functional mobility            PLAN     Update POC and request additional visits from MD Rose Ballesteros, PT                                        [facility]   Physical Therapy Treatment Note     Name: Rod De La Paz  [facility] Number: [id number]    Therapy Diagnosis:   Encounter Diagnosis   Name Primary?    Impaired gait and mobility Yes     Physician: Alton Moeller MD     Visit Date: 3/29/2023    Physician Orders: PT Eval and Treat   Medical Diagnosis from Referral: Lumbar Radiculopathy - LLE pain   Evaluation Date: 1/3/2023  Authorization Period Expiration: 12/31/2023  Plan of Care Expiration: 3/31/2023  Visit # / Visits authorized: 18   FOTO Visit #:  2/3    PTA Visit #: 0/5     Time In:  1:00 pm   Time Out: 2:00    Total Billable Time:  55 minutes    SUBJECTIVE     Pt reports:  Doing well; no new complaints   He was compliant with home exercise program.  Response to previous treatment: improved mobility, legs feel stronger   Functional change: continues to perform usual activities without difficulty     Pain: 1/10   Location: left hip/LE      OBJECTIVE     Objective Measures updated at progress report unless  "specified.     Treatment     Rod received the treatments listed below:      therapeutic exercises to develop strength, ROM, flexibility, posture, and core stabilization for 45 minutes including:  Recumbent bike - Level 8 x 13  mins   SLR - 2# x 15 - slow controlled motion up/down   SAQ - 7# small gray roll   S/L left/right hip abduction x 15  S/L clams - black band around thighs  Bridges - black band  around thighs x 10   Pelvic tilts x 10 - activate TrAbs first then posterior tilt; palms push down on table for increased core activation.   Activation of T/A with marching x 10   Quadruped alternate arm and leg x 10   Quadruped - neutral spine into posterior pelvic tilt x 6 with 5" hold   3-way hip - blue tband   Standing - split quat lunges with hand assist on   Standing: - TKE on 6"step - front and lateral x 20   Standing: backwards step-up onto 6" step x 20  Standing: squats x 20 - holding orange ball   Cable Column:   Paloff presses with rotation - 10#   High > Low - 13#   Low > High - 13#   Scapular retraction - rows 13# - alternate lead foot x 20 each     Psoas strengthen- SLS with opposite foot on 12"stool - lift foot off stool - past 90 deg hip flexion and hold x 5 - toe tap to bench and repeat x 10 each leg        manual therapy techniques: Joint mobilizations and Soft tissue Mobilization were applied to the: Left hip  for 10  minutes, including:  Left hip - long axis distraction with end range hold; posterior capsule stretch; Inferior glide of femur in acetabulum; prone - anterior gliding to stretch anterior capsule. Prone - mobilization of lumbar spine, caudal mobilization of sacrum to address flexed spine segments.     Neuromuscular re-education activities to improve:  for  minutes. The following activities were included:      therapeutic activities to improve functional performance for   minutes, including:      gait training to improve functional mobility and safety for   minutes, " including:        Patient Education and Home Exercises     Home Exercises Provided and Patient Education Provided     Education provided:   - Hip ROM exercises     Written Home Exercises Provided: yes. Exercises were reviewed and Rod was able to demonstrate them prior to the end of the session.  Rod demonstrated good  understanding of the education provided. See EMR under Patient Instructions for exercises provided during therapy sessions    ASSESSMENT     Rod continues to do well; Increased level of exercise for more core engagement. Has not had any return of lower back pain since RAYMOND;   Rod Is progressing well towards his goals.   Pt prognosis is Good.     Pt will continue to benefit from skilled outpatient physical therapy to address the deficits listed in the problem list box on initial evaluation, provide pt/family education and to maximize pt's level of independence in the home and community environment.     Pt's spiritual, cultural and educational needs considered and pt agreeable to plan of care and goals.     Anticipated barriers to physical therapy: radiation schedule   Goals:  Long Term Goals: 6 weeks   Maintain left hip and lower back pain to no more than 2/10 with return to golfing and usual daily activities   Able to walk several blocks without increased pain or compensation   Able to get shoes/socks on left LE without increased pain or compensation   Able to return to golf - able to shift weight from right to left during swing without buckling of knee.   Improve limitation score on FOTO to </= 27%   Independent with HEP for continued improvement in functional mobility            PLAN     Continue with Skilled physical Therapy per KRISTY Ballesteros, PT

## 2023-03-30 NOTE — PLAN OF CARE
"[facility]  Physical Therapy Plan of Care Note    Name: Rod De La Paz  Clinic Number: [id number]    Therapy Diagnosis:   Encounter Diagnosis   Name Primary?    Impaired gait and mobility Yes     Physician: Alton Moeller    Visit Date: 3/29/2023    Physician Orders: PT Eval and Treat   Medical Diagnosis from Referral: Lumbar Radiculopathy; LLE pain   Evaluation Date: 1/3/2023  Authorization Period Expiration: 1/3/2023   Plan of Care Expiration: 6/30/2023   Visit # / Visits authorized: 21/ 21  FOTO: 2/3    Precautions: Standard  Functional Level Prior to Evaluation:    Independent, golfs a couple of times per week; Was working out with ; but stopped this over the holidays - as well as scheduled to start radiation tx for some suspicious areas that could be related to previous dx of prostate CA.     SUBJECTIVE     Update: Rod has been doing well since starting therapy back in January; He underwent a Lumbar RAYMOND on 1/4/2023 - this has curtailed his lower back pain and allowed him to resume his usual activities.      OBJECTIVE     Update: Rod continues to demonstrate an antalgic gait pattern as result of his left hip OA - severe DJD with significant limitation in ROM into extension, IR, Abduction, but has elected to postpone any surgery secondary to his radiation treatments.     Posture: forward trunk flexion; anterior pelvic tilt, unable to  neutral secondary to loss of left hip extension; varus deformity at knees, left > right, slight leg length discrepancy noted - left about 2/8" shorter than right as result of hip position and inability to straighten his left knee.      Hip Range of Motion:  Right Hip: WFL in all planes   Left Hip: flexion WFL, but has tightness in hip flexors that limit hi ability to extend the hip as well as joint stiffness in hip that limited extension - cannot reach neutral position; Limited left hip IR to almost 0, left hip ER to 20, " Abduction to 15 deg.   Right/Left Knees: WFL     Lumbar Spine: reduced flexion, but functional for him; significantly reduced extension - can just get back to neutral alignment, but no extension from there; Lateral side-bending - hands to mid thigh, but with some rotation.         Lower Extremity Strength    Right LE Left LE   Knee extension: 5/5 4+/5   Knee flexion: 5/5 5/5   Hip flexion: 5/5 4+/5   Hip Internal Rotation:  5/5    3/5 within ROM       Hip External Rotation: 5/5    4+/5 within ROM       Hip extension:  5/5 3+/5 within ROM    Hip abduction: 4+/5  4+/5   Hip adduction: 4/5 4/5   Ankle dorsiflexion: 5/5 5/5   Ankle plantarflexion: 4/5 4/5          ASSESSMENT     Update: Rod continues to do well; Tolerating increased level of exercise for more core mm engagement without any increase in symptoms. He recently started at [employer] [place] gym for upper body and lower body exercise on days he doesn't come to therapy. He is finished with his radiation now. Improving LE strength noted,     Long Term Goal Status: continue per initial plan of care.  Reasons for Recertification of Therapy:   Will continue to benefit from core mm strengthening, balance and endurance training.     GOALS:     Long Term Goals: 6 weeks   Maintain left hip and lower back pain to no more than 2/10 with return to golfing and usual daily activities   Able to walk several blocks without increased pain or compensation   Able to get shoes/socks on left LE without increased pain or compensation   Able to return to golf - able to shift weight from right to left during swing without buckling of knee.   Improve limitation score on FOTO to </= 27%   Independent with HEP for continued improvement in functional mobility            PLAN     Updated Certification Period: 3/29/2023 to 6/30/2023   Recommended Treatment Plan: 2 times per week for 6 weeks:  Manual Therapy, Neuromuscular Re-ed, Patient Education, Therapeutic Activities, and Therapeutic  Santa Ballesteros PT    I CERTIFY THE NEED FOR THESE SERVICES FURNISHED UNDER THIS PLAN OF TREATMENT AND WHILE UNDER MY CARE  Physician's comments:      Physician's Signature: ___________________________________________________

## 2023-04-03 ENCOUNTER — CLINICAL SUPPORT (OUTPATIENT)
Dept: REHABILITATION | Facility: HOSPITAL | Age: 78
End: 2023-04-03
Payer: MEDICARE

## 2023-04-03 DIAGNOSIS — R26.89 IMPAIRED GAIT AND MOBILITY: Primary | ICD-10-CM

## 2023-04-03 PROCEDURE — 97110 THERAPEUTIC EXERCISES: CPT | Mod: PN

## 2023-04-03 NOTE — PROGRESS NOTES
"[facility]   Physical Therapy Treatment Note     Name: Rod De La Paz  Clinic Number: [id number]    Therapy Diagnosis:   Encounter Diagnosis   Name Primary?    Impaired gait and mobility Yes     Physician: Alton Moeller MD     Visit Date: 4/3/2023    Physician Orders: PT Eval and Treat   Medical Diagnosis from Referral: Lumbar Radiculopathy - LLE pain   Evaluation Date: 1/3/2023  Authorization Period Expiration: 12/31/2023  Plan of Care Expiration: 6/30/2023   Visit # / Visits authorized: 22  FOTO Visit #:  2/3    PTA Visit #: 0/5     Time In:  9:00  am   Time Out: 10:00  am     Total Billable Time:  30  minutes    SUBJECTIVE     Pt reports:  Doing well; no new complaints, busy weekend - in [place] for Class reunion, had to do more walking than usual, but reports that he did ok.   He was compliant with home exercise program.  Response to previous treatment: improved mobility, legs feel stronger   Functional change: continues to perform usual activities without difficulty; started at [employer] weight circuit; riding the bicycle.     Pain: 1/10   Location: left hip/LE      OBJECTIVE     Objective Measures updated at progress report unless specified.     Treatment     Rod received the treatments listed below:      therapeutic exercises to develop strength, ROM, flexibility, posture, and core stabilization for 45 minutes including:  Recumbent bike - Level 8 x 13  mins   SLR - 2# x 15 - slow controlled motion up/down   SAQ - 7# small gray roll   S/L left/right hip abduction x 15 - 2#   S/L clams - black band around thighs  Bridges - black band  around thighs x 10   Pelvic tilts x 10 - activate TrAbs first then posterior tilt; palms push down on table for increased core activation.   Activation of T/A with marching x 10   Quadruped alternate arm and leg x 10   Quadruped - neutral spine into posterior pelvic tilt x 6 with 5" hold   3-way hip - blue tband   Standing - split quat " "lunges with hand assist on   Standing: - TKE on 4"step - front and lateral x 20   Standing: backwards step-up onto 6" step x 20  Standing: squats x 20 - holding orange ball   Psoas strengthen- SLS with opposite foot on 12"stool - lift foot off stool - past 90 deg hip flexion and hold x 5 - toe tap to bench and repeat x 10 each leg      DNP   manual therapy techniques: Joint mobilizations and Soft tissue Mobilization were applied to the: Left hip  for 10  minutes, including:  Left hip - long axis distraction with end range hold; posterior capsule stretch; Inferior glide of femur in acetabulum; prone - anterior gliding to stretch anterior capsule. Prone - mobilization of lumbar spine, caudal mobilization of sacrum to address flexed spine segments.     Neuromuscular re-education activities to improve:  for  minutes. The following activities were included:      therapeutic activities to improve functional performance for   minutes, including:      gait training to improve functional mobility and safety for   minutes, including:        Patient Education and Home Exercises     Home Exercises Provided and Patient Education Provided     Education provided:   - Hip ROM exercises     Written Home Exercises Provided: yes. Exercises were reviewed and Rod was able to demonstrate them prior to the end of the session.  Rod demonstrated good  understanding of the education provided. See EMR under Patient Instructions for exercises provided during therapy sessions    ASSESSMENT     Rod continues to do well; Increased level of exercise for more core engagement.  Started at [employer] gym - this should add to his P.T. and help improve overall endurance. Antalgic gait pattern more apparent over the past week - left hip bothers him at times; Patient is now finished with his radiation - may consider hip replacement if pain gets too bad.   Rod Is progressing well towards his goals.   Pt prognosis is Good.     Pt will continue to " benefit from skilled outpatient physical therapy to address the deficits listed in the problem list box on initial evaluation, provide pt/family education and to maximize pt's level of independence in the home and community environment.     Pt's spiritual, cultural and educational needs considered and pt agreeable to plan of care and goals.     Anticipated barriers to physical therapy: radiation schedule   Goals:  Long Term Goals: 6 weeks   Maintain left hip and lower back pain to no more than 2/10 with return to golfing and usual daily activities   Able to walk several blocks without increased pain or compensation   Able to get shoes/socks on left LE without increased pain or compensation   Able to return to golf - able to shift weight from right to left during swing without buckling of knee.   Improve limitation score on FOTO to </= 27%   Independent with HEP for continued improvement in functional mobility            PLAN     Continue PT for 4-6 weeks and transition to HEP/Gym Program     Rose Ballesteros, PT

## 2023-04-05 ENCOUNTER — CLINICAL SUPPORT (OUTPATIENT)
Dept: REHABILITATION | Facility: HOSPITAL | Age: 78
End: 2023-04-05
Payer: MEDICARE

## 2023-04-05 DIAGNOSIS — R26.89 IMPAIRED GAIT AND MOBILITY: Primary | ICD-10-CM

## 2023-04-05 PROCEDURE — 97110 THERAPEUTIC EXERCISES: CPT | Mod: PN

## 2023-04-05 NOTE — PROGRESS NOTES
"[facility]   Physical Therapy Treatment Note     Name: Rod De La Paz  Clinic Number: [id number]    Therapy Diagnosis:   Encounter Diagnosis   Name Primary?    Impaired gait and mobility Yes     Physician: Alton Moeller MD     Visit Date: 4/5/2023    Physician Orders: PT Eval and Treat   Medical Diagnosis from Referral: Lumbar Radiculopathy - LLE pain   Evaluation Date: 1/3/2023  Authorization Period Expiration: 12/31/2023  Plan of Care Expiration: 6/30/2023   Visit # / Visits authorized: 23  FOTO Visit #:  2/3    PTA Visit #: 0/5     Time In:  9:00  am   Time Out: 10:00  am     Total Billable Time:  30  minutes    SUBJECTIVE     Pt reports:  Doing well; no new complaints; returns to oncology next week for re-check.    He was compliant with home exercise program.  Response to previous treatment: improved mobility, legs feel stronger   Functional change: continues to perform usual activities without difficulty; started at [employer] weight circuit; riding the bicycle.     Pain: 1/10   Location: left hip/LE      OBJECTIVE     Objective Measures updated at progress report unless specified.     Treatment     Rod received the treatments listed below:      therapeutic exercises to develop strength, ROM, flexibility, posture, and core stabilization for 45 minutes including:  Recumbent bike - Level 8 x 13  mins   SLR - 2# x 15 - slow controlled motion up/down   SAQ - 7# small gray roll   S/L left/right hip abduction x 15 - 2#   S/L clams - black band around thighs  Bridges - black band  around thighs x 10   Pelvic tilts x 10 - activate TrAbs first then posterior tilt; palms push down on table for increased core activation.   Activation of T/A with marching x 10   Quadruped alternate arm and leg x 10   Quadruped - neutral spine into posterior pelvic tilt x 6 with 5" hold   3-way hip - blue tband   Standing - split quat lunges with hand assist on   Standing: - TKE on 4"step - front and " "lateral x 20   Standing: backwards step-up onto 6" step x 20  Standing: squats x 20 - holding orange ball   Psoas strengthen- SLS with opposite foot on 12"stool - lift foot off stool - past 90 deg hip flexion and hold x 5 - toe tap to bench and repeat x 10 each leg  - added hip ER with raised leg to work piriformis in addition to psoas.     DNP   manual therapy techniques: Joint mobilizations and Soft tissue Mobilization were applied to the: Left hip  for 10  minutes, including:  Left hip - long axis distraction with end range hold; posterior capsule stretch; Inferior glide of femur in acetabulum; prone - anterior gliding to stretch anterior capsule. Prone - mobilization of lumbar spine, caudal mobilization of sacrum to address flexed spine segments.     Neuromuscular re-education activities to improve:  for  minutes. The following activities were included:      therapeutic activities to improve functional performance for   minutes, including:      gait training to improve functional mobility and safety for   minutes, including:        Patient Education and Home Exercises     Home Exercises Provided and Patient Education Provided     Education provided:   - Hip ROM exercises     Written Home Exercises Provided: yes. Exercises were reviewed and Rod was able to demonstrate them prior to the end of the session.  Rod demonstrated good  understanding of the education provided. See EMR under Patient Instructions for exercises provided during therapy sessions    ASSESSMENT     Rod continues to do well; Increased level of exercise for more core engagement.  Started at [employer] gym - this should add to his P.T. and help improve overall endurance. Antalgic gait pattern more apparent over the past week - left hip bothers him at times; Patient is now finished with his radiation - may consider hip replacement if pain gets too bad.   Rod Is progressing well towards his goals.   Pt prognosis is Good.     Pt will continue " to benefit from skilled outpatient physical therapy to address the deficits listed in the problem list box on initial evaluation, provide pt/family education and to maximize pt's level of independence in the home and community environment.     Pt's spiritual, cultural and educational needs considered and pt agreeable to plan of care and goals.     Anticipated barriers to physical therapy: radiation schedule   Goals:  Long Term Goals: 6 weeks   Maintain left hip and lower back pain to no more than 2/10 with return to golfing and usual daily activities   Able to walk several blocks without increased pain or compensation   Able to get shoes/socks on left LE without increased pain or compensation   Able to return to golf - able to shift weight from right to left during swing without buckling of knee.   Improve limitation score on FOTO to </= 27%   Independent with HEP for continued improvement in functional mobility            PLAN     Continue PT for 4-6 weeks and transition to HEP/Gym Program     Rose Ballesteros, PT

## 2023-04-10 ENCOUNTER — CLINICAL SUPPORT (OUTPATIENT)
Dept: REHABILITATION | Facility: HOSPITAL | Age: 78
End: 2023-04-10
Payer: MEDICARE

## 2023-04-10 DIAGNOSIS — R26.89 IMPAIRED GAIT AND MOBILITY: Primary | ICD-10-CM

## 2023-04-10 PROCEDURE — 97110 THERAPEUTIC EXERCISES: CPT | Mod: PN

## 2023-04-10 NOTE — PROGRESS NOTES
"[facility]   Physical Therapy Treatment Note     Name: Rod De La Paz  Clinic Number: [id number]    Therapy Diagnosis:   Encounter Diagnosis   Name Primary?    Impaired gait and mobility Yes     Physician: Alton Moeller MD     Visit Date: 4/10/2023    Physician Orders: PT Eval and Treat   Medical Diagnosis from Referral: Lumbar Radiculopathy - LLE pain   Evaluation Date: 1/3/2023  Authorization Period Expiration: 12/31/2023  Plan of Care Expiration: 6/30/2023   Visit # / Visits authorized: 23  FOTO Visit #:  2/3    PTA Visit #: 0/5     Time In:  1:30 pm   Time Out: 2:30 pm     Total Billable Time:  30  minutes    SUBJECTIVE     Pt reports:  I just feel like I got run over this morning, not sure why, just tired, joints ache a little; returns to oncology this week for re-check.    He was compliant with home exercise program.  Response to previous treatment: improved mobility, legs feel stronger   Functional change: continues to perform usual activities without difficulty; started at [employer] doing weight circuit; riding the bicycle.     Pain: 1/10   Location: left hip/LE      OBJECTIVE     Objective Measures updated at progress report unless specified.     Treatment     Rod received the treatments listed below:      therapeutic exercises to develop strength, ROM, flexibility, posture, and core stabilization for 45 minutes including:  Recumbent bike - Level 8 x 13  mins   SLR - 2# x 15 - slow controlled motion up/down   SAQ - 7# small gray roll   S/L left/right hip abduction x 15 - 2#   S/L clams - black band around thighs  Bridges - black band  around thighs x 10   Pelvic tilts x 10 - activate TrAbs first then posterior tilt; palms push down on table for increased core activation.   Activation of T/A with marching x 10   Quadruped alternate arm and leg x 10   Quadruped - neutral spine into posterior pelvic tilt x 6 with 5" hold   3-way hip - blue tband   Standing - split quat lunges " "with hand assist on   Standing: - TKE on 4"step - front and lateral x 20   Standing: backwards step-up onto 6" step x 20  Standing: squats x 20 - holding orange ball   Psoas strengthen- SLS with opposite foot on 12"stool - lift foot off stool - past 90 deg hip flexion and hold x 5 - toe tap to bench and repeat x 10 each leg  - added hip ER with raised leg to work piriformis in addition to psoas.       manual therapy techniques: Joint mobilizations and Soft tissue Mobilization were applied to the: Left hip  for 10  minutes, including:  Left hip - long axis distraction with end range hold; posterior capsule stretch; Inferior glide of femur in acetabulum; prone - anterior gliding to stretch anterior capsule. Prone - mobilization of lumbar spine, caudal mobilization of sacrum to address flexed spine segments.     Neuromuscular re-education activities to improve:  for  minutes. The following activities were included:      therapeutic activities to improve functional performance for   minutes, including:      gait training to improve functional mobility and safety for   minutes, including:        Patient Education and Home Exercises     Home Exercises Provided and Patient Education Provided     Education provided:   - Hip ROM exercises     Written Home Exercises Provided: yes. Exercises were reviewed and Rod was able to demonstrate them prior to the end of the session.  Rod demonstrated good  understanding of the education provided. See EMR under Patient Instructions for exercises provided during therapy sessions    ASSESSMENT     Rod continues to do well; Increased level of exercise for more core engagement.  Started at [employer] gym - this should add to his P.T. and help improve overall endurance. Antalgic gait pattern more apparent over the past week - left hip bothers him at times; Patient is now finished with his radiation - may consider hip replacement if pain gets too bad.   Rod Is progressing well towards " his goals.   Pt prognosis is Good.     Pt will continue to benefit from skilled outpatient physical therapy to address the deficits listed in the problem list box on initial evaluation, provide pt/family education and to maximize pt's level of independence in the home and community environment.     Pt's spiritual, cultural and educational needs considered and pt agreeable to plan of care and goals.     Anticipated barriers to physical therapy: radiation schedule   Goals:  Long Term Goals: 6 weeks   Maintain left hip and lower back pain to no more than 2/10 with return to golfing and usual daily activities   Able to walk several blocks without increased pain or compensation   Able to get shoes/socks on left LE without increased pain or compensation   Able to return to golf - able to shift weight from right to left during swing without buckling of knee.   Improve limitation score on FOTO to </= 27%   Independent with HEP for continued improvement in functional mobility            PLAN     Continue PT for 4-6 weeks and transition to HEP/Gym Program     Rose Ballesteros, PT

## 2023-04-17 ENCOUNTER — CLINICAL SUPPORT (OUTPATIENT)
Dept: REHABILITATION | Facility: HOSPITAL | Age: 78
End: 2023-04-17
Payer: MEDICARE

## 2023-04-17 DIAGNOSIS — R26.89 IMPAIRED GAIT AND MOBILITY: Primary | ICD-10-CM

## 2023-04-17 PROCEDURE — 97110 THERAPEUTIC EXERCISES: CPT | Mod: PN

## 2023-04-17 NOTE — PROGRESS NOTES
[facility]   Physical Therapy Treatment Note     Name: Rod De La Paz  Clinic Number: [id number]    Therapy Diagnosis:   Encounter Diagnosis   Name Primary?    Impaired gait and mobility Yes     Physician: Alton Moeller MD     Visit Date: 4/17/2023    Physician Orders: PT Eval and Treat   Medical Diagnosis from Referral: Lumbar Radiculopathy - LLE pain   Evaluation Date: 1/3/2023  Authorization Period Expiration: 12/31/2023  Plan of Care Expiration: 6/30/2023   Visit # / Visits authorized: 25  FOTO Visit #:  2/3    PTA Visit #: 0/5     Time In:  1:30 pm   Time Out: 2:25 pm      Total Billable Time:  55 minutes    SUBJECTIVE     Pt reports:  My left knee is bothering me a little bit today; Golf was cart path only this weekend - I did a lot of walking on soggy ground. Going to make an appointment with ortho to look at this knee. Thinking about when to have his hip replacement as well.   He was compliant with home exercise program.  Response to previous treatment: improved mobility, legs feel stronger   Functional change: antalgic gait increased today as result of left knee pain along with hip OA.     Pain: 1/10   Location: left hip/LE      OBJECTIVE     Objective Measures updated at progress report unless specified.     Treatment     Rod received the treatments listed below:      therapeutic exercises to develop strength, ROM, flexibility, posture, and core stabilization for 45 minutes including:  Recumbent bike - Level 8 x 13  mins   SLR - 2# x 15 - slow controlled motion up/down   SAQ - 7# small gray roll   S/L left/right hip abduction x 20- 2#   S/L clams - black band around thighs x 20   Bridges - black band  around thighs x 10   Pelvic tilts x 10 - activate TrAbs first then posterior tilt; palms push down on table for increased core activation.   Activation of T/A with marching x 10   Quadruped alternate arm and leg x 10   Quadruped - neutral spine into posterior pelvic tilt x 6  "with 5" hold   3-way hip - blue tband   Standing - split quat lunges with hand assist on   Standing: - TKE on 4"step - front and lateral x 20   Standing: backwards step-up onto 6" step x 20  Standing: squats x 20 - holding orange ball   Psoas strengthen- SLS with opposite foot on 12"stool - lift foot off stool - past 90 deg hip flexion and hold x 5 - toe tap to bench and repeat x 10 each leg  - added hip ER with raised leg to work piriformis in addition to psoas.       manual therapy techniques: Joint mobilizations and Soft tissue Mobilization were applied to the: Left hip  for 10  minutes, including:  Left hip - long axis distraction with end range hold; posterior capsule stretch; Inferior glide of femur in acetabulum; prone - anterior gliding to stretch anterior capsule. Prone - mobilization of lumbar spine, caudal mobilization of sacrum to address flexed spine segments.     Neuromuscular re-education activities to improve:  for  minutes. The following activities were included:      therapeutic activities to improve functional performance for   minutes, including:      gait training to improve functional mobility and safety for   minutes, including:        Patient Education and Home Exercises     Home Exercises Provided and Patient Education Provided     Education provided:   - Hip ROM exercises     Written Home Exercises Provided: yes. Exercises were reviewed and Rod was able to demonstrate them prior to the end of the session.  Rod demonstrated good  understanding of the education provided. See EMR under Patient Instructions for exercises provided during therapy sessions    ASSESSMENT     Rod continues to do well; Increased level of exercise for more core engagement.  New onset of left knee pain today as result of golf/walking over the weekend.      Rod Is progressing well towards his goals.   Pt prognosis is Good.     Pt will continue to benefit from skilled outpatient physical therapy to address the " deficits listed in the problem list box on initial evaluation, provide pt/family education and to maximize pt's level of independence in the home and community environment.     Pt's spiritual, cultural and educational needs considered and pt agreeable to plan of care and goals.     Anticipated barriers to physical therapy: radiation schedule   Goals:  Long Term Goals: 6 weeks   Maintain left hip and lower back pain to no more than 2/10 with return to golfing and usual daily activities   Able to walk several blocks without increased pain or compensation   Able to get shoes/socks on left LE without increased pain or compensation   Able to return to golf - able to shift weight from right to left during swing without buckling of knee.   Improve limitation score on FOTO to </= 27%   Independent with HEP for continued improvement in functional mobility            PLAN     Continue PT for 1 more visit.  Discuss discharge.     Rose Ballesteros, PT

## 2023-04-19 ENCOUNTER — CLINICAL SUPPORT (OUTPATIENT)
Dept: REHABILITATION | Facility: HOSPITAL | Age: 78
End: 2023-04-19
Payer: MEDICARE

## 2023-04-19 DIAGNOSIS — R26.89 IMPAIRED GAIT AND MOBILITY: Primary | ICD-10-CM

## 2023-04-19 PROCEDURE — 97110 THERAPEUTIC EXERCISES: CPT | Mod: KX,PN

## 2023-04-19 NOTE — PROGRESS NOTES
"[facility]   Physical Therapy Treatment Note     Name: Rod De La Paz  Clinic Number: [id number]    Therapy Diagnosis:   Encounter Diagnosis   Name Primary?    Impaired gait and mobility Yes     Physician: Alton Moeller MD     Visit Date: 4/19/2023    Physician Orders: PT Eval and Treat   Medical Diagnosis from Referral: Lumbar Radiculopathy - LLE pain   Evaluation Date: 1/3/2023  Authorization Period Expiration: 12/31/2023  Plan of Care Expiration: 6/30/2023   Visit # / Visits authorized: 26  FOTO Visit #:  2/3    PTA Visit #: 0/5     Time In:  1:00 pm   Time Out: 1:55 pm      Total Billable Time:  50 minutes    SUBJECTIVE     Pt reports:  walking better today, knee still sore, but improved from the other day.    He was compliant with home exercise program.  Response to previous treatment: improved mobility, legs feel stronger   Functional change: antalgic gait increased today as result of left knee pain along with hip OA.     Pain: 1/10   Location: left hip/LE      OBJECTIVE     Objective Measures updated at progress report unless specified.     Treatment     Rod received the treatments listed below:      therapeutic exercises to develop strength, ROM, flexibility, posture, and core stabilization for 45 minutes including:  Recumbent bike - Level 8 x 13  mins   SLR - 2# x 15 - slow controlled motion up/down   SAQ - 7# small gray roll   S/L left/right hip abduction x 20- 2#   S/L clams - black band around thighs x 20   Bridges - black band  around thighs x 10   Pelvic tilts x 10 - activate TrAbs first then posterior tilt; palms push down on table for increased core activation.   Activation of T/A with marching x 10   Quadruped alternate arm and leg x 10   Quadruped - neutral spine into posterior pelvic tilt x 6 with 5" hold   3-way hip - blue tband   Standing - split quat lunges with hand assist on   Standing: - TKE on 4"step - front and lateral x 20   Standing: backwards step-up " "onto 6" step x 20  Standing: squats x 20 - holding orange ball   Psoas strengthen- SLS with opposite foot on 12"stool - lift foot off stool - past 90 deg hip flexion and hold x 5 - toe tap to bench and repeat x 10 each leg  - added hip ER with raised leg to work piriformis in addition to psoas.       manual therapy techniques: Joint mobilizations and Soft tissue Mobilization were applied to the: Left hip  for 5  minutes, including:  Left hip - long axis distraction with end range hold; posterior capsule stretch; Inferior glide of femur in acetabulum; prone - anterior gliding to stretch anterior capsule. Prone - mobilization of lumbar spine, caudal mobilization of sacrum to address flexed spine segments.     Neuromuscular re-education activities to improve:  for  minutes. The following activities were included:      therapeutic activities to improve functional performance for   minutes, including:      gait training to improve functional mobility and safety for   minutes, including:        Patient Education and Home Exercises     Home Exercises Provided and Patient Education Provided     Education provided:   - Hip ROM exercises     Written Home Exercises Provided: yes. Exercises were reviewed and Rod was able to demonstrate them prior to the end of the session.  Rod demonstrated good  understanding of the education provided. See EMR under Patient Instructions for exercises provided during therapy sessions    ASSESSMENT     Rod continues to do well; Increased level of exercise for more core engagement. Less left knee pain; reduced ROM in left hip remains.      Rod Is progressing well towards his goals.   Pt prognosis is Good.     Pt will continue to benefit from skilled outpatient physical therapy to address the deficits listed in the problem list box on initial evaluation, provide pt/family education and to maximize pt's level of independence in the home and community environment.     Pt's spiritual, " cultural and educational needs considered and pt agreeable to plan of care and goals.     Anticipated barriers to physical therapy: radiation schedule   Goals:  Long Term Goals: 6 weeks   Maintain left hip and lower back pain to no more than 2/10 with return to golfing and usual daily activities   Able to walk several blocks without increased pain or compensation   Able to get shoes/socks on left LE without increased pain or compensation   Able to return to golf - able to shift weight from right to left during swing without buckling of knee.   Improve limitation score on FOTO to </= 27%   Independent with HEP for continued improvement in functional mobility            PLAN     Continue PT  - patient wants to continue working for a few more weeks - has golfing tournament coming up and wants to stay strong.     Rose Ballesteros, PT

## 2023-05-08 ENCOUNTER — CLINICAL SUPPORT (OUTPATIENT)
Dept: REHABILITATION | Facility: HOSPITAL | Age: 78
End: 2023-05-08
Payer: MEDICARE

## 2023-05-08 DIAGNOSIS — R26.89 IMPAIRED GAIT AND MOBILITY: Primary | ICD-10-CM

## 2023-05-08 PROCEDURE — 97110 THERAPEUTIC EXERCISES: CPT | Mod: KX,PN

## 2023-05-08 PROCEDURE — 97140 MANUAL THERAPY 1/> REGIONS: CPT | Mod: KX,PN

## 2023-05-08 NOTE — PROGRESS NOTES
"[facility]   Physical Therapy Treatment Note     Name: Rod De La Paz  Clinic Number: [id number]    Therapy Diagnosis:   Encounter Diagnosis   Name Primary?    Impaired gait and mobility Yes     Physician: Alton Moeller MD     Visit Date: 5/8/2023    Physician Orders: PT Eval and Treat   Medical Diagnosis from Referral: Lumbar Radiculopathy - LLE pain   Evaluation Date: 1/3/2023  Authorization Period Expiration: 12/31/2023  Plan of Care Expiration: 6/30/2023   Visit # / Visits authorized: 27  FOTO Visit #:  2/3    PTA Visit #: 0/5     Time In:  9:00 am    Time Out: 10:00 am      Total Billable Time:  53 minutes    SUBJECTIVE     Pt reports:  went to see Dr. Fischer a couple of weeks ago - got a steroid shot in his knee - MD told him that this may/may not help as it is his hip that is the issue.  Will be returning to MD office next week to discuss hip surgery timing,   He was compliant with home exercise program.  Response to previous treatment: improved mobility, legs feel stronger   Functional change: reports knee feeling much better since steroid injection.      Pain: 1/10   Location: left hip/LE      OBJECTIVE     Objective Measures updated at progress report unless specified.     Treatment     Rod received the treatments listed below:      therapeutic exercises to develop strength, ROM, flexibility, posture, and core stabilization for 43 minutes including:  Recumbent bike - Level 8 x 13  mins   SLR - 2.5 # x 15 - slow controlled motion up/down   SAQ - 7# small gray roll   S/L left/right hip abduction x 20- blue band resistance    S/L clams - black band around thighs x 20   Bridges - black band  around thighs x 10   Pelvic tilts x 10 - activate TrAbs first then posterior tilt; palms push down on table for increased core activation.   Activation of T/A with marching x 10   Quadruped alternate arm and leg x 10   Quadruped - neutral spine into posterior pelvic tilt x 6 with 5" hold " "  3-way hip - blue tband   Standing - split quat lunges with hand assist on   Standing: - TKE on 6"step - front and lateral x 20   Standing: backwards step-up onto 6" step x 20  Standing: squats x 20 - holding orange ball   Psoas strengthen- SLS with opposite foot on 12"stool - lift foot off stool - past 90 deg hip flexion and hold x 5 - toe tap to bench and repeat x 10 each leg  - added hip ER with raised leg to work piriformis in addition to psoas.       manual therapy techniques: Joint mobilizations and Soft tissue Mobilization were applied to the: Left hip  for 10  minutes, including:  Left hip - long axis distraction with end range hold; posterior capsule stretch; Inferior glide of femur in acetabulum; prone - anterior gliding to stretch anterior capsule. Prone - mobilization of lumbar spine, caudal mobilization of sacrum to address flexed spine segments.     Neuromuscular re-education activities to improve:  for  minutes. The following activities were included:      therapeutic activities to improve functional performance for   minutes, including:      gait training to improve functional mobility and safety for   minutes, including:        Patient Education and Home Exercises     Home Exercises Provided and Patient Education Provided     Education provided:   - Hip ROM exercises     Written Home Exercises Provided: yes. Exercises were reviewed and Rod was able to demonstrate them prior to the end of the session.  Rod demonstrated good  understanding of the education provided. See EMR under Patient Instructions for exercises provided during therapy sessions    ASSESSMENT     Rod continues to do well; Increased level of exercise for more core engagement. Steroid injection in left knee helpful in reducing pain in both his hip and knee, antalgic gait continues secondary to leg length and reduced ROM in hip.       Rod Is progressing well towards his goals.   Pt prognosis is Good.     Pt will continue to " benefit from skilled outpatient physical therapy to address the deficits listed in the problem list box on initial evaluation, provide pt/family education and to maximize pt's level of independence in the home and community environment.     Pt's spiritual, cultural and educational needs considered and pt agreeable to plan of care and goals.     Anticipated barriers to physical therapy: radiation schedule   Goals:  Long Term Goals: 6 weeks   Maintain left hip and lower back pain to no more than 2/10 with return to golfing and usual daily activities   Able to walk several blocks without increased pain or compensation   Able to get shoes/socks on left LE without increased pain or compensation   Able to return to golf - able to shift weight from right to left during swing without buckling of knee.   Improve limitation score on FOTO to </= 27%   Independent with HEP for continued improvement in functional mobility            PLAN     Continue PT  - patient wants to continue working for a few more weeks - has golfing tournament coming up and wants to stay strong.     Rose Ballesteros, PT

## 2023-05-10 ENCOUNTER — CLINICAL SUPPORT (OUTPATIENT)
Dept: REHABILITATION | Facility: HOSPITAL | Age: 78
End: 2023-05-10
Payer: MEDICARE

## 2023-05-10 DIAGNOSIS — R26.89 IMPAIRED GAIT AND MOBILITY: Primary | ICD-10-CM

## 2023-05-10 PROCEDURE — 97110 THERAPEUTIC EXERCISES: CPT | Mod: KX,PN

## 2023-05-10 PROCEDURE — 97140 MANUAL THERAPY 1/> REGIONS: CPT | Mod: KX,PN

## 2023-05-10 NOTE — PROGRESS NOTES
"[facility]   Physical Therapy Treatment Note     Name: Rod De La Paz  Clinic Number: [id number]    Therapy Diagnosis:   Encounter Diagnosis   Name Primary?    Impaired gait and mobility Yes     Physician: Alton Moeller MD     Visit Date: 5/10/2023    Physician Orders: PT Eval and Treat   Medical Diagnosis from Referral: Lumbar Radiculopathy - LLE pain   Evaluation Date: 1/3/2023  Authorization Period Expiration: 12/31/2023  Plan of Care Expiration: 6/30/2023   Visit # / Visits authorized: 28  FOTO Visit #:  2/3    PTA Visit #: 0/5     Time In:  9:00 am    Time Out: 10:00 am      Total Billable Time:  53 minutes    SUBJECTIVE     Pt reports: " I'm doing ok today"  Still noting no knee pain, left hip feels better as well;   He was compliant with home exercise program.  Response to previous treatment: improved mobility, legs feel stronger   Functional change: reports knee feeling much better since steroid injection.      Pain: 1/10   Location: left hip/LE      OBJECTIVE     Objective Measures updated at progress report unless specified.     Treatment     Rod received the treatments listed below:      therapeutic exercises to develop strength, ROM, flexibility, posture, and core stabilization for 43 minutes including:  Recumbent bike - Level 8 x 5 mins   SLR - 2.5 # x 15 - slow controlled motion up/down   SAQ - 7# small gray roll   S/L left/right hip abduction x 20- blue band resistance    S/L clams - black band around thighs x 20   S/L reverse clams x 15   Bridges - black band  around thighs x 10   Pelvic tilts x 10 - activate TrAbs first then posterior tilt; palms push down on table for increased core activation.   Activation of T/A with marching x 10  . Then with knee outs - black band around thigh   Quadruped alternate arm and leg x 10   Quadruped - neutral spine into posterior pelvic tilt x 6 with 5" hold   3-way hip - blue tband   Standing - split quat lunges with hand assist on " "  Standing: - TKE on 6"step - front and lateral x 20   Standing: backwards step-up onto 6" step x 20  Standing: squats x 20 - holding orange ball   Psoas strengthen- SLS with opposite foot on 12"stool - lift foot off stool - past 90 deg hip flexion and hold x 5 - toe tap to bench and repeat x 10 each leg  - added hip ER with raised leg to work piriformis in addition to psoas.       manual therapy techniques: Joint mobilizations and Soft tissue Mobilization were applied to the: Left hip  for 12  minutes, including:  Left hip - long axis distraction with end range hold; posterior capsule stretch; Inferior glide of femur in acetabulum; S/L on right - hip flexor stretch, left femur into extension       Neuromuscular re-education activities to improve:  for  minutes. The following activities were included:      therapeutic activities to improve functional performance for   minutes, including:      gait training to improve functional mobility and safety for   minutes, including:        Patient Education and Home Exercises     Home Exercises Provided and Patient Education Provided     Education provided:   -why leg length difference - effects left hip OA have on pelvis/lower back > knee pain   - Hip ROM exercises     Written Home Exercises Provided: yes. Exercises were reviewed and Rod was able to demonstrate them prior to the end of the session.  Rod demonstrated good  understanding of the education provided. See EMR under Patient Instructions for exercises provided during therapy sessions    ASSESSMENT     Rod continues to do well; Increased level of exercise for more core engagement. Steroid injection in left knee helpful in reducing pain in both his hip and knee, antalgic gait continues secondary to leg length and reduced ROM in hip.       Rod Is progressing well towards his goals.   Pt prognosis is Good.     Pt will continue to benefit from skilled outpatient physical therapy to address the deficits listed in " the problem list box on initial evaluation, provide pt/family education and to maximize pt's level of independence in the home and community environment.     Pt's spiritual, cultural and educational needs considered and pt agreeable to plan of care and goals.     Anticipated barriers to physical therapy: radiation schedule   Goals:  Long Term Goals: 6 weeks   Maintain left hip and lower back pain to no more than 2/10 with return to golfing and usual daily activities   Able to walk several blocks without increased pain or compensation   Able to get shoes/socks on left LE without increased pain or compensation   Able to return to golf - able to shift weight from right to left during swing without buckling of knee.   Improve limitation score on FOTO to </= 27%   Independent with HEP for continued improvement in functional mobility            PLAN     Continue PT  - patient wants to continue working for a few more weeks - has golfing tournament coming up and wants to stay strong.     Rose Ballesteros, PT

## 2023-05-15 ENCOUNTER — CLINICAL SUPPORT (OUTPATIENT)
Dept: REHABILITATION | Facility: HOSPITAL | Age: 78
End: 2023-05-15
Payer: MEDICARE

## 2023-05-15 DIAGNOSIS — R26.89 IMPAIRED GAIT AND MOBILITY: Primary | ICD-10-CM

## 2023-05-15 PROCEDURE — 97140 MANUAL THERAPY 1/> REGIONS: CPT | Mod: KX,PN

## 2023-05-15 PROCEDURE — 97110 THERAPEUTIC EXERCISES: CPT | Mod: KX,PN

## 2023-05-15 NOTE — PROGRESS NOTES
"[facility]   Physical Therapy Treatment Note     Name: Rod De La Paz  Clinic Number: [id number]    Therapy Diagnosis:   Encounter Diagnosis   Name Primary?    Impaired gait and mobility Yes     Physician: Alton Moeller MD     Visit Date: 5/15/2023    Physician Orders: PT Eval and Treat   Medical Diagnosis from Referral: Lumbar Radiculopathy - LLE pain   Evaluation Date: 1/3/2023  Authorization Period Expiration: 12/31/2023  Plan of Care Expiration: 6/30/2023   Visit # / Visits authorized: 29  FOTO Visit #:  2/3    PTA Visit #: 0/5     Time In:  10: 00 am    Time Out: 11:00  am      Total Billable Time:  53 minutes    SUBJECTIVE     Pt reports: " I'm doing ok today"  Still noting no knee pain, left hip feels better as well; going back to see Dr. Fischer tomorrow.   He was compliant with home exercise program.  Response to previous treatment: improved mobility, legs feel stronger   Functional change: reports knee feeling much better since steroid injection.      Pain: 1/10   Location: left hip/LE      OBJECTIVE     Objective Measures updated at progress report unless specified.     Treatment     Rod received the treatments listed below:      therapeutic exercises to develop strength, ROM, flexibility, posture, and core stabilization for 43 minutes including:  Recumbent bike - Level 8 x 5 mins   SLR - 2.5 # x 15 - slow controlled motion up/down   SAQ - 7# small gray roll x 2 mins   S/L left/right hip abduction x 20- blue band resistance    S/L clams - black band around thighs x 20   S/L reverse clams x 15   Bridges - black band  around thighs x 10   Pelvic tilts x 10 - activate TrAbs first then posterior tilt; palms push down on table for increased core activation.   Activation of T/A with marching x 10  . Then with knee outs - black band around thigh   Quadruped alternate arm and leg x 10   Quadruped - neutral spine into posterior pelvic tilt x 6 with 5" hold   3-way hip - blue tband " "  Standing - split quat lunges with hand assist on   Standing: - TKE on 6"step - front and lateral x 20   Standing: backwards step-up onto 6" step x 20  Standing: squats x 20 - holding orange ball   Psoas strengthen- SLS with opposite foot on 12"stool - lift foot off stool - past 90 deg hip flexion and hold x 5 - toe tap to bench and repeat x 10 each leg  - added hip ER with raised leg to work piriformis in addition to psoas.       manual therapy techniques: Joint mobilizations and Soft tissue Mobilization were applied to the: Left hip  for 10  minutes, including:  Left hip - long axis distraction with end range hold; posterior capsule stretch; Inferior glide of femur in acetabulum; S/L on right - hip flexor stretch, left femur into extension       Neuromuscular re-education activities to improve:  for  minutes. The following activities were included:      therapeutic activities to improve functional performance for   minutes, including:      gait training to improve functional mobility and safety for   minutes, including:        Patient Education and Home Exercises     Home Exercises Provided and Patient Education Provided     Education provided:   -why leg length difference - effects left hip OA have on pelvis/lower back > knee pain   - Hip ROM exercises     Written Home Exercises Provided: yes. Exercises were reviewed and Rod was able to demonstrate them prior to the end of the session.  Rod demonstrated good  understanding of the education provided. See EMR under Patient Instructions for exercises provided during therapy sessions    ASSESSMENT     Rod continues to do well; Increased level of exercise for more core engagement. Steroid injection in left knee helpful in reducing pain in both his hip and knee, antalgic gait continues secondary to leg length and reduced ROM in hip.       Rod Is progressing well towards his goals.   Pt prognosis is Good.     Pt will continue to benefit from skilled outpatient " physical therapy to address the deficits listed in the problem list box on initial evaluation, provide pt/family education and to maximize pt's level of independence in the home and community environment.     Pt's spiritual, cultural and educational needs considered and pt agreeable to plan of care and goals.     Anticipated barriers to physical therapy: radiation schedule   Goals:  Long Term Goals: 6 weeks   Maintain left hip and lower back pain to no more than 2/10 with return to golfing and usual daily activities   Able to walk several blocks without increased pain or compensation   Able to get shoes/socks on left LE without increased pain or compensation   Able to return to golf - able to shift weight from right to left during swing without buckling of knee.   Improve limitation score on FOTO to </= 27%   Independent with HEP for continued improvement in functional mobility            PLAN     Continue PT  - patient wants to continue working for a few more weeks - has golfing tournament coming up and wants to stay strong.     Rose Ballesteros, PT

## 2023-05-22 ENCOUNTER — CLINICAL SUPPORT (OUTPATIENT)
Dept: REHABILITATION | Facility: HOSPITAL | Age: 78
End: 2023-05-22
Payer: MEDICARE

## 2023-05-22 DIAGNOSIS — R26.89 IMPAIRED GAIT AND MOBILITY: Primary | ICD-10-CM

## 2023-05-22 PROCEDURE — 97140 MANUAL THERAPY 1/> REGIONS: CPT | Mod: KX,PN

## 2023-05-22 PROCEDURE — 97110 THERAPEUTIC EXERCISES: CPT | Mod: KX,PN

## 2023-05-22 NOTE — PROGRESS NOTES
"[facility]   Physical Therapy Treatment Note     Name: Rod De La Paz  Clinic Number: [id number]    Therapy Diagnosis:   Encounter Diagnosis   Name Primary?    Impaired gait and mobility Yes     Physician: Alton Moeller MD     Visit Date: 5/22/2023    Physician Orders: PT Eval and Treat   Medical Diagnosis from Referral: Lumbar Radiculopathy - LLE pain   Evaluation Date: 1/3/2023  Authorization Period Expiration: 12/31/2023  Plan of Care Expiration: 6/30/2023   Visit # / Visits authorized: 30   FOTO Visit #:  2/3    PTA Visit #: 0/5     Time In:  9:00 am    Time Out: 10:00 am      Total Billable Time:  53 minutes    SUBJECTIVE     Pt reports:  My legs are sore from 3 days of golf.  I think I'm getting my hip done on July 19th.    He was compliant with home exercise program.  Response to previous treatment: improved mobility, legs feel stronger   Functional change: walking with increased antalgic gait pattern - LLE pain today. .      Pain: 4/10   Location: left hip/LE      OBJECTIVE     Objective Measures updated at progress report unless specified.     Treatment     Rod received the treatments listed below:      therapeutic exercises to develop strength, ROM, flexibility, posture, and core stabilization for 43 minutes including:  Recumbent bike - Level 8 x 5 mins   SLR - 2.5 # x 15 - slow controlled motion up/down   SAQ - 7# small gray roll x 2 mins   S/L left/right hip abduction x 20- blue band resistance    S/L clams - black band around thighs x 20   S/L reverse clams x 15   Bridges - black band  around thighs x 10   Pelvic tilts x 10 - activate TrAbs first then posterior tilt; palms push down on table for increased core activation.   Activation of T/A with marching x 10  . Then with knee outs - black band around thigh   Quadruped alternate arm and leg x 10   Quadruped - neutral spine into posterior pelvic tilt x 6 with 5" hold   3-way hip - blue tband   Standing - split quat " "lunges with hand assist on   Standing: - TKE on 6"step - front and lateral x 20   Standing: backwards step-up onto 6" step x 20  Standing: squats x 20 - holding orange ball   Psoas strengthen- SLS with opposite foot on 12"stool - lift foot off stool - past 90 deg hip flexion and hold x 5 - toe tap to bench and repeat x 10 each leg  - added hip ER with raised leg to work piriformis in addition to psoas.       manual therapy techniques: Joint mobilizations and Soft tissue Mobilization were applied to the: Left hip  for 10  minutes, including:  Left hip - long axis distraction with end range hold; posterior capsule stretch; Inferior glide of femur in acetabulum; S/L on right - hip flexor stretch, left femur into extension       Neuromuscular re-education activities to improve:  for  minutes. The following activities were included:      therapeutic activities to improve functional performance for   minutes, including:      gait training to improve functional mobility and safety for   minutes, including:        Patient Education and Home Exercises     Home Exercises Provided and Patient Education Provided     Education provided:   -why leg length difference - effects left hip OA have on pelvis/lower back > knee pain   - Hip ROM exercises     Written Home Exercises Provided: yes. Exercises were reviewed and Rod was able to demonstrate them prior to the end of the session.  Rdo demonstrated good  understanding of the education provided. See EMR under Patient Instructions for exercises provided during therapy sessions    ASSESSMENT     Rod continues to do well; Increased level of exercise for more core engagement.  antalgic gait continues secondary to leg length and reduced ROM in hip.  Reduction in pain noted after therapy.      Rod Is progressing well towards his goals.   Pt prognosis is Good.     Pt will continue to benefit from skilled outpatient physical therapy to address the deficits listed in the problem " list box on initial evaluation, provide pt/family education and to maximize pt's level of independence in the home and community environment.     Pt's spiritual, cultural and educational needs considered and pt agreeable to plan of care and goals.     Anticipated barriers to physical therapy: radiation schedule   Goals:  Long Term Goals: 6 weeks   Maintain left hip and lower back pain to no more than 2/10 with return to golfing and usual daily activities   Able to walk several blocks without increased pain or compensation   Able to get shoes/socks on left LE without increased pain or compensation   Able to return to golf - able to shift weight from right to left during swing without buckling of knee.   Improve limitation score on FOTO to </= 27%   Independent with HEP for continued improvement in functional mobility            PLAN     Continue PT  - few more visits, then discharge     Rose Ballesteros, PT

## 2023-05-24 ENCOUNTER — CLINICAL SUPPORT (OUTPATIENT)
Dept: REHABILITATION | Facility: HOSPITAL | Age: 78
End: 2023-05-24
Payer: MEDICARE

## 2023-05-24 DIAGNOSIS — R26.89 IMPAIRED GAIT AND MOBILITY: Primary | ICD-10-CM

## 2023-05-24 PROCEDURE — 97140 MANUAL THERAPY 1/> REGIONS: CPT | Mod: KX,PN

## 2023-05-24 PROCEDURE — 97110 THERAPEUTIC EXERCISES: CPT | Mod: KX,PN

## 2023-05-24 NOTE — PROGRESS NOTES
"[facility]   Physical Therapy Treatment Note     Name: Rod De La Paz  Clinic Number: [id number]    Therapy Diagnosis:   Encounter Diagnosis   Name Primary?    Impaired gait and mobility Yes     Physician: Alton Moeller MD     Visit Date: 5/24/2023    Physician Orders: PT Eval and Treat   Medical Diagnosis from Referral: Lumbar Radiculopathy - LLE pain   Evaluation Date: 1/3/2023  Authorization Period Expiration: 12/31/2023  Plan of Care Expiration: 6/30/2023   Visit # / Visits authorized: 31  FOTO Visit #:  2/3    PTA Visit #: 0/5     Time In:  11:00 am    Time Out: 10:00 am      Total Billable Time:  53 minutes    SUBJECTIVE     Pt reports:  I'm doing ok today;  Booked for Left hip surgery on July 19th;     He was compliant with home exercise program.  Response to previous treatment: improved mobility, legs feel stronger   Functional change: walking with increased antalgic gait pattern secondary to left hip      Pain: 4/10   Location: left hip/LE      OBJECTIVE     Objective Measures updated at progress report unless specified.     Treatment     Rod received the treatments listed below:      therapeutic exercises to develop strength, ROM, flexibility, posture, and core stabilization for 43 minutes including:  Recumbent bike - Level 8 x 5 mins   SLR - 2.5 # x 15 - slow controlled motion up/down   SAQ - 7# small gray roll x 2 mins   S/L left/right hip abduction x 20- blue band resistance    S/L clams - black band around thighs x 20   S/L reverse clams x 15   Bridges - black band  around thighs x 10   Pelvic tilts x 10 - activate TrAbs first then posterior tilt; palms push down on table for increased core activation.   Activation of T/A with marching x 10  . Then with knee outs - black band around thigh   Quadruped alternate arm and leg x 10   Quadruped - neutral spine into posterior pelvic tilt x 6 with 5" hold   3-way hip - blue tband   Standing - split quat lunges with hand assist " "on   Standing: - TKE on 6"step - front and lateral x 20   Standing: backwards step-up onto 6" step x 20  Standing: squats x 20 - holding orange ball   Psoas strengthen- SLS with opposite foot on 12"stool - lift foot off stool - past 90 deg hip flexion and hold x 5 - toe tap to bench and repeat x 10 each leg  - added hip ER with raised leg to work piriformis in addition to psoas.       manual therapy techniques: Joint mobilizations and Soft tissue Mobilization were applied to the: Left hip  for 10  minutes, including:  Left hip - long axis distraction with end range hold; posterior capsule stretch; Inferior glide of femur in acetabulum; S/L on right - hip flexor stretch, left femur into extension       Neuromuscular re-education activities to improve:  for  minutes. The following activities were included:      therapeutic activities to improve functional performance for   minutes, including:      gait training to improve functional mobility and safety for   minutes, including:        Patient Education and Home Exercises     Home Exercises Provided and Patient Education Provided     Education provided:   -why leg length difference - effects left hip OA have on pelvis/lower back > knee pain   - Hip ROM exercises     Written Home Exercises Provided: yes. Exercises were reviewed and Rod was able to demonstrate them prior to the end of the session.  Rod demonstrated good  understanding of the education provided. See EMR under Patient Instructions for exercises provided during therapy sessions    ASSESSMENT     Rod continues to do well; Increased level of exercise for more core engagement.  antalgic gait continues secondary to leg length and reduced ROM in hip.  Reduction in pain noted after therapy.    one visit remains - will need to continue Independently at home/gym until hip surgery in July. Pain levels kept in check; Left LE strength improved;   Rod Is progressing well towards his goals.   Pt prognosis is " Good.     Pt will continue to benefit from skilled outpatient physical therapy to address the deficits listed in the problem list box on initial evaluation, provide pt/family education and to maximize pt's level of independence in the home and community environment.     Pt's spiritual, cultural and educational needs considered and pt agreeable to plan of care and goals.     Anticipated barriers to physical therapy: radiation schedule   Goals:  Long Term Goals: 6 weeks   Maintain left hip and lower back pain to no more than 2/10 with return to golfing and usual daily activities   Able to walk several blocks without increased pain or compensation   Able to get shoes/socks on left LE without increased pain or compensation   Able to return to golf - able to shift weight from right to left during swing without buckling of knee.   Improve limitation score on FOTO to </= 27%   Independent with HEP for continued improvement in functional mobility            PLAN     Continue PT  - one more visit, then discharge     Rose Ballesteros, PT                                                                        [facility]   Physical Therapy Treatment Note     Name: Rod [facility] Number: [id number]    Therapy Diagnosis:   Encounter Diagnosis   Name Primary?    Impaired gait and mobility Yes     Physician: Alton Moeller MD     Visit Date: 5/24/2023    Physician Orders: PT Eval and Treat   Medical Diagnosis from Referral: Lumbar Radiculopathy - LLE pain   Evaluation Date: 1/3/2023  Authorization Period Expiration: 12/31/2023  Plan of Care Expiration: 6/30/2023   Visit # / Visits authorized: 30   FOTO Visit #:  2/3    PTA Visit #: 0/5     Time In:  9:00 am    Time Out: 10:00 am      Total Billable Time:  53 minutes    SUBJECTIVE     Pt reports:  My legs are sore from 3 days of golf.  I think I'm getting my hip done on July 19th.    He was compliant with home exercise program.  Response to previous  "treatment: improved mobility, legs feel stronger   Functional change: walking with increased antalgic gait pattern - LLE pain today. .      Pain: 4/10   Location: left hip/LE      OBJECTIVE     Objective Measures updated at progress report unless specified.     Treatment     Rod received the treatments listed below:      therapeutic exercises to develop strength, ROM, flexibility, posture, and core stabilization for 43 minutes including:  Recumbent bike - Level 8 x 5 mins   SLR - 2.5 # x 15 - slow controlled motion up/down   SAQ - 7# small gray roll x 2 mins   S/L left/right hip abduction x 20- blue band resistance    S/L clams - black band around thighs x 20   S/L reverse clams x 15   Bridges - black band  around thighs x 10   Pelvic tilts x 10 - activate TrAbs first then posterior tilt; palms push down on table for increased core activation.   Activation of T/A with marching x 10  . Then with knee outs - black band around thigh   Quadruped alternate arm and leg x 10   Quadruped - neutral spine into posterior pelvic tilt x 6 with 5" hold   3-way hip - blue tband   Standing - split quat lunges with hand assist on   Standing: - TKE on 6"step - front and lateral x 20   Standing: backwards step-up onto 6" step x 20  Standing: squats x 20 - holding orange ball   Psoas strengthen- SLS with opposite foot on 12"stool - lift foot off stool - past 90 deg hip flexion and hold x 5 - toe tap to bench and repeat x 10 each leg  - added hip ER with raised leg to work piriformis in addition to psoas.       manual therapy techniques: Joint mobilizations and Soft tissue Mobilization were applied to the: Left hip  for 10  minutes, including:  Left hip - long axis distraction with end range hold; posterior capsule stretch; Inferior glide of femur in acetabulum; S/L on right - hip flexor stretch, left femur into extension       Neuromuscular re-education activities to improve:  for  minutes. The following activities were " included:      therapeutic activities to improve functional performance for   minutes, including:      gait training to improve functional mobility and safety for   minutes, including:        Patient Education and Home Exercises     Home Exercises Provided and Patient Education Provided     Education provided:   -why leg length difference - effects left hip OA have on pelvis/lower back > knee pain   - Hip ROM exercises     Written Home Exercises Provided: yes. Exercises were reviewed and Rod was able to demonstrate them prior to the end of the session.  Rod demonstrated good  understanding of the education provided. See EMR under Patient Instructions for exercises provided during therapy sessions    ASSESSMENT     Rod continues to do well; Increased level of exercise for more core engagement.  antalgic gait continues secondary to leg length and reduced ROM in hip.  Reduction in pain noted after therapy.      Rod Is progressing well towards his goals.   Pt prognosis is Good.     Pt will continue to benefit from skilled outpatient physical therapy to address the deficits listed in the problem list box on initial evaluation, provide pt/family education and to maximize pt's level of independence in the home and community environment.     Pt's spiritual, cultural and educational needs considered and pt agreeable to plan of care and goals.     Anticipated barriers to physical therapy: radiation schedule   Goals:  Long Term Goals: 6 weeks   Maintain left hip and lower back pain to no more than 2/10 with return to golfing and usual daily activities   Able to walk several blocks without increased pain or compensation   Able to get shoes/socks on left LE without increased pain or compensation   Able to return to golf - able to shift weight from right to left during swing without buckling of knee.   Improve limitation score on FOTO to </= 27%   Independent with HEP for continued improvement in functional mobility             PLAN     Continue PT  - few more visits, then discharge     Rose Ballesteros, PT

## 2023-05-31 ENCOUNTER — CLINICAL SUPPORT (OUTPATIENT)
Dept: REHABILITATION | Facility: HOSPITAL | Age: 78
End: 2023-05-31
Payer: MEDICARE

## 2023-05-31 DIAGNOSIS — R26.89 IMPAIRED GAIT AND MOBILITY: Primary | ICD-10-CM

## 2023-05-31 PROCEDURE — 97110 THERAPEUTIC EXERCISES: CPT | Mod: KX,PN

## 2023-05-31 NOTE — PROGRESS NOTES
[facility]   Physical Therapy Treatment Note / Discharge Note     Name: Rod De La Paz  Clinic Number: [id number]    Therapy Diagnosis:   Encounter Diagnosis   Name Primary?    Impaired gait and mobility Yes     Physician: Alton Moeller MD     Visit Date: 5/31/2023    Physician Orders: PT Eval and Treat   Medical Diagnosis from Referral: Lumbar Radiculopathy - LLE pain   Evaluation Date: 1/3/2023  Authorization Period Expiration: 12/31/2023  Plan of Care Expiration: 6/30/2023   Visit # / Visits authorized: 32  FOTO Visit #:  3/3    PTA Visit #: 0/5     Time In:  9:00 am    Time Out: 10:00  am      Total Billable Time:  53 minutes    SUBJECTIVE     Pt reports:  I'm doing ok today;  Booked for Left hip surgery on July 19th; Today will be last visit prior to his hip surgery.  Rod has demonstrated improvement in LE strength, core mm strength, but continued pain and bone/bone of left hip has led to antalgic gait and pain - need for VLADIMIR.     He was compliant with home exercise program.  Response to previous treatment: improved mobility, legs feel stronger   Functional change: walking with increased antalgic gait pattern secondary to left hip      Pain: 4/10   Location: left hip/LE      OBJECTIVE     Objective Measures updated at progress report unless specified.     Hip Range of Motion:  Right Hip: WFL in all planes   Left Hip: flexion WFL, but has tightness in hip flexors that limit hi ability to extend the hip as well as joint stiffness in hip that limited extension - cannot reach neutral position; Limited left hip IR to almost 0, left hip ER to 20, Abduction to 15 deg.   Right/Left Knees: WFL     Lumbar Spine: reduced flexion, but functional for him; significantly reduced extension - can just get back to neutral alignment, but no extension from there; Lateral side-bending - hands to mid thigh, but with some rotation.  Secondary to Progressive left hip OA - difficult to get hip into  "neutral - stays in about 10 degrees of flexion - now walks with flexed hip and knee on left - decreased ability to extend hip through gait cycle.         Lower Extremity Strength    Right LE Left LE   Knee extension: 5/5 4+/5 > 5/5    Knee flexion: 5/5 4+/5 > 5/5   Hip flexion: 5/5 4+/5   Hip Internal Rotation:  5/5    3/5 within ROM  > 3+/5       Hip External Rotation: 5/5    4+/5 within ROM  > 5/5       Hip extension:  5/5 3/5 within ROM    Hip abduction: 4+/5  4/5 > 4+/5    Hip adduction: 4/5 4-/5 > 4/5    Ankle dorsiflexion: 5/5 5/5   Ankle plantarflexion: 4/5 4/5        Treatment     Rod received the treatments listed below:      therapeutic exercises to develop strength, ROM, flexibility, posture, and core stabilization for 43 minutes including:  Recumbent bike - Level 8 x 5 mins   SLR -  x 15 - slow controlled motion up/down   SAQ - 9 # small gray roll x 2 mins   S/L left/right hip abduction x 20- blue band resistance    S/L clams - black band around thighs x 20   S/L reverse clams x 15   Bridges - black band  around thighs x 10   Pelvic tilts x 10 - activate TrAbs first then posterior tilt; palms push down on table for increased core activation.   Activation of T/A with marching x 10  . Then with knee outs - black band around thigh   Quadruped alternate arm and leg x 10   Quadruped - neutral spine into posterior pelvic tilt x 6 with 5" hold   3-way hip - blue tband   Standing - split quat lunges with hand assist on   Standing: - TKE on 6"step - front and lateral x 20   Standing: backwards step-up onto 6" step x 20  Standing: squats x 20 - holding orange ball   Psoas strengthen- SLS with opposite foot on 12"stool - lift foot off stool - past 90 deg hip flexion and hold x 5 - toe tap to bench and repeat x 10 each leg  - added hip ER with raised leg to work piriformis in addition to psoas.       manual therapy techniques: Joint mobilizations and Soft tissue Mobilization were applied to the: Left hip  for 10  " minutes, including:  Left hip - long axis distraction with end range hold; posterior capsule stretch; Inferior glide of femur in acetabulum; S/L on right - hip flexor stretch, left femur into extension       Patient Education and Home Exercises     Home Exercises Provided and Patient Education Provided     Education provided:   -why leg length difference - effects left hip OA have on pelvis/lower back > knee pain   - Hip ROM exercises     Written Home Exercises Provided: yes. Exercises were reviewed and Rod was able to demonstrate them prior to the end of the session.  Rod demonstrated good  understanding of the education provided. See EMR under Patient Instructions for exercises provided during therapy sessions    ASSESSMENT     Rod continues to do well; Increased level of exercise for more core engagement.  antalgic gait continues secondary to leg length and reduced ROM in hip.  Reduction in pain noted after therapy.    one visit remains - will need to continue Independently at home/gym until hip surgery in July. Pain levels kept in check; Left LE strength improved; Last visit today.  Will continue for next month at the gym until he has his hip surgery in July.  Encouraged him to attend gym at least 2x/week to keep up LE strength and CV health.    Rod Is progressing well towards his goals.   Pt prognosis is Good.      Anticipated barriers to physical therapy:   Goals:  Long Term Goals: 6 weeks   Maintain left hip and lower back pain to no more than 2/10 with return to golfing and usual daily activities > MET   Able to walk several blocks without increased pain or compensation > MET, but does have discomfort in left hip   Able to get shoes/socks on left LE without increased pain or compensation > MET   Able to return to golf - able to shift weight from right to left during swing without buckling of knee.  > MET - has been able to play in several golf tournaments without difficulty   Improve limitation score  on FOTO to </= 27%  > MET   Independent with HEP for continued improvement in functional mobility  > MET            PLAN     Discharge from PT - reached maximum functional potential at this time.  Upcoming VLADIMIR in July.     Rose Ballesteros, PT

## 2023-07-05 DIAGNOSIS — Z96.642 S/P TOTAL LEFT HIP ARTHROPLASTY: ICD-10-CM

## 2023-07-05 DIAGNOSIS — M17.12 LOCALIZED PRIMARY OSTEOARTHRITIS OF LEFT LOWER LEG: Primary | ICD-10-CM

## 2023-08-02 ENCOUNTER — CLINICAL SUPPORT (OUTPATIENT)
Dept: REHABILITATION | Facility: HOSPITAL | Age: 78
End: 2023-08-02
Payer: MEDICARE

## 2023-08-02 DIAGNOSIS — M17.12 LOCALIZED PRIMARY OSTEOARTHRITIS OF LEFT LOWER LEG: ICD-10-CM

## 2023-08-02 DIAGNOSIS — Z96.642 S/P TOTAL LEFT HIP ARTHROPLASTY: ICD-10-CM

## 2023-08-02 DIAGNOSIS — Z74.09 IMPAIRED FUNCTIONAL MOBILITY, BALANCE, GAIT, AND ENDURANCE: ICD-10-CM

## 2023-08-02 PROCEDURE — 97161 PT EVAL LOW COMPLEX 20 MIN: CPT | Mod: KX,PN

## 2023-08-02 PROCEDURE — 97116 GAIT TRAINING THERAPY: CPT | Mod: KX,PN

## 2023-08-02 NOTE — PLAN OF CARE
[facility]   Physical Therapy Initial Evaluation      Name: Rod De La Paz  Clinic Number: [id number]    Therapy Diagnosis:   Encounter Diagnoses   Name Primary?    Localized primary osteoarthritis of left lower leg     S/P total left hip arthroplasty     Impaired functional mobility, balance, gait, and endurance         Physician: Luther Fischer MD    Physician Orders: PT Eval and Treat   Medical Diagnosis from Referral: Localized primary osteoarthritis of left lower leg   Evaluation Date: 8/2/2023  Authorization Period Expiration: 8/2/2024  Plan of Care Expiration: 11/1/2023  Progress Note Due: 9/2/ 2023   Visit # / Visits authorized: 1/ 1   FOTO: 1/ 3    Precautions: Standard and VLADIMIR precautions       Time In: 11:00 am   Time Out: 12:00 am   Total Billable Time: 60  minutes    Subjective     Date of onset: 7/19/2023    History of current condition - Rod reports: 2 weeks today - Left VLADIMIR - discharged from  last week;  Reports that he is doing really well at home. Using RW for the most part, but has recently been trying to use the cane (quad) as well.  He is ambulating freely around the house; performing his exercises 3x/day; able to get in/out of the truck now; Still sleeping in the recliner - just more comfortable at this point and it keeps him from crossing his legs.  Rod is well aware of his hip precautions.  Right VLADIMIR in 2020.     Falls: no falls     Prior Therapy: Rod was here earlier this year for general core and LE strengthening during/following radiation treatments for suspicious cells in peritoneal area - history of prostate CA. Rod was also having increased lower back pain at this time.   Social History: lives with spouse;  single story home; walk in shower;   Occupation: retired   Prior Level of Function: Independent, but poor gait quality secondary to left hip degeneration.  Enjoys golfing   Current Level of Function: Mod I ambulation with use of RW and quad cane;  "Independent ADL's; needs some assistance with compression socks and shoes.     Pain:  Current 0/10, worst 4/10, best 0/10   Location: left  hip   Description: Aching  Aggravating Factors: Sitting, Morning, and Getting out of bed/chair  Easing Factors:  rest, elevation     Patients goals: To return to full functional mobility      Medical History:  Right VLADIMIR; Lumbar spondylosis/stenosis; Prostate CA       Surgical History:   Rod De La Paz  has no past surgical history on file.    Medications:   Rod currently has no medications in their medication list.    Allergies:   Review of patient's allergies indicates:  Not on File     Objective      Observation: Rod ambulated into clinic with use of RW; accompanied by wife. Alert/oriented x 4.  Gait - shortened stance time on left; not following through with hip extension on left.      Posture: forward head, anterior pelvic tilt; still shifting weight towards RLE.     Hip Range of Motion:   Left active Left Passive Right active  Right Passive   Flexion 75 85 WFL WFL   Abduction 15 18 WFL WFL   Extension 5 5 WFL WFL   Ext. Rotation 10 25 WFL WFL   Int. Rotation NT NT  WFL WFL          Lower Extremity Strength   Right LE Left LE   Knee extension: 4+/5 4-/5   Knee flexion: 4/5 4-/5   Hip flexion: 5/5 4/5   Hip Internal Rotation:  4/5    NT      Hip External Rotation: 4+/5    3-/5      Hip extension:  4/5 3+/5   Hip abduction: 4/5 3-/5   Hip adduction: 4/5 3+/5   Ankle dorsiflexion: 5/5 5/5   Ankle plantarflexion: 4/5 3+/5     SLS: Firm 6 sec,    Toe Tap test: 4 reps on 6" step with unaffecting leg in 15 seconds  30 sec sit <> stand from elevated surface: 5 reps with arms crossed - cueing for weight shifting - shifts off of left with both sit/stand - had patient perform in front of mirror for better feedback   Step-ups: 4" step: can lead with LLE - needs minor UE assist and shortened stance time on left     Palpation: moderate tenderness to palpation at left posterior/lateral " hip and gluteals - incision with steri-strips; no drainage noted; healing; still with tissue induration ekta-incisional;     Sensation: intact to light touch BLE's; does take gabapentin for neuropathy symptoms.     Flexibility:     90/90 SLR = R moderate restriction, L moderate restriction   Raji test: R moderate restriction, L minimal restriction    PT Evaluation Completed? Yes  Discussed Plan of Care with patient: Yes     Intake Outcome Measure for FOTO Hip Survey    Therapist reviewed FOTO scores for Rod De La Paz on 8/2/2023.   FOTO documents entered into [employer] - see Media section.    Intake Score: 40 %         Treatment     Total Treatment time (time-based codes) separate from Evaluation: 10 minutes     Rod received the treatments listed below:        gait training to improve functional mobility and safety for 10  minutes, including:  Practiced in // bars with light hand on bars - increased stride with improved hip extension on left as right leg swings through; better heel to toe with push-off on left; able to use mirror for feedback - much improved gait pattern noted.  Transitioned to quad cane and able to demonstrate improved pattern for 100ft.     Patient Education and Home Exercises     Education provided:   - Gait, sit <>stand - practice equal weight on R/L; improved hip flexion on return to sitting with equal sitting on R/L IT's.     Written Home Exercises Provided: Patient instructed to cont prior HEP. To continue HH exercises until next appointment.  Will update HEP as exercises advanced. Rod demonstrated good  understanding of the education provided.     Assessment     Rod is a 78 y.o. male referred to outpatient Physical Therapy with a medical diagnosis of Primary OA left hip,. Patient presents with 2 weeks post-op Left VLADIMIR. He presents with impaired gait, balance, endurance, core and LE strength, left hip ROM and functional mobility for daily/recreational activities.     Patient prognosis is  Good.   Patient will benefit from skilled outpatient Physical Therapy to address the deficits stated above and in the chart below, provide patient /family education, and to maximize patientt's level of independence.     Plan of care discussed with patient: Yes  Patient's spiritual, cultural and educational needs considered and patient is agreeable to the plan of care and goals as stated below:     Anticipated Barriers for therapy: none     Medical Necessity is demonstrated by the following  History  Co-morbidities and personal factors that may impact the plan of care [x] LOW: no personal factors / co-morbidities  [] MODERATE: 1-2 personal factors / co-morbidities  [] HIGH: 3+ personal factors / co-morbidities    Moderate / High Support Documentation:   Co-morbidities affecting plan of care: N/A     Personal Factors:   no deficits     Examination  Body Structures and Functions, activity limitations and participation restrictions that may impact the plan of care [] LOW: addressing 1-2 elements  [x] MODERATE: 3+ elements  [] HIGH: 4+ elements (please support below)    Moderate / High Support Documentation: see objective      Clinical Presentation [x] LOW: stable  [] MODERATE: Evolving  [] HIGH: Unstable     Decision Making/ Complexity Score: low       Goals:  Short Term Goals: 3 weeks   Reduction in pain levels to no more than 1/10 with daily activities for improved ADL performance.   Ambulating with cane at household/community distances   Able to perform sit <>stand with good technique, no pain or compensation   Compliant with HEP     Long Term Goals: 6 weeks   Reduction in pain to 0/10 with return to usual daily activities.   Able to demonstrate functional AROM within restrictions in left hip without increased pain or compensation.   Ambulating 3 blocks without use of AD - no pain or compensation.   Able to perform sit <>stand 8 reps in 30 secs. without compensation.   Able to demonstrate 1/2 grade increase in LE  strength for improved functional mobility and activity performance.   FOTO score improved to 68%   Independent with HEP for continued improvement in function.     Plan     Plan of care Certification: 8/2/2023 to 11/2/2023.    Outpatient Physical Therapy 2 times weekly for 6 weeks to include the following interventions: Gait Training, Manual Therapy, Neuromuscular Re-ed, Patient Education, Therapeutic Activities, and Therapeutic Exercise.     Rose Ballesteros, PT        Physician's Signature: _________________________________________ Date: ________________

## 2023-08-07 ENCOUNTER — CLINICAL SUPPORT (OUTPATIENT)
Dept: REHABILITATION | Facility: HOSPITAL | Age: 78
End: 2023-08-07
Payer: MEDICARE

## 2023-08-07 DIAGNOSIS — Z74.09 IMPAIRED FUNCTIONAL MOBILITY, BALANCE, GAIT, AND ENDURANCE: Primary | ICD-10-CM

## 2023-08-07 PROCEDURE — 97530 THERAPEUTIC ACTIVITIES: CPT | Mod: PN

## 2023-08-07 PROCEDURE — 97110 THERAPEUTIC EXERCISES: CPT | Mod: PN

## 2023-08-07 NOTE — PROGRESS NOTES
[facility]   Physical Therapy Treatment Note      Name: Rod De La Paz  Clinic Number: [id number]    Therapy Diagnosis:   Encounter Diagnosis   Name Primary?    Impaired functional mobility, balance, gait, and endurance Yes     Physician: Luther Fischer MD    Visit Date: 8/7/2023    Physician Orders: PT Eval and Treat   Medical Diagnosis from Referral: Localized primary osteoarthritis of left lower leg   Evaluation Date: 8/2/2023  Authorization Period Expiration: 8/2/2024  Plan of Care Expiration: 11/1/2023  Progress Note Due: 9/2/ 2023   Visit # / Visits authorized: 1+eval  FOTO: 1/ 3     Precautions: Standard and VLADIMIR precautions        Time In: 10:00 am   Time Out: 11:00 am   Total Billable Time: 55  minutes    PTA Visit #: 0/5       Subjective     Pt reports: I slept on my right side for the first time last night. No issues.  He was compliant with home exercise program.  Response to previous treatment: ok  Functional change: --    Pain: 2/10  Location: left hip    Objective      Objective Measures updated at progress report unless specified.     Treatment     Rod received the treatments listed below:      therapeutic exercises to develop strength, endurance, ROM, and core stabilization for 28 minutes including:  Quad sets x 2 mins  Hooklying Ball squeeze x 2 mins  Hooklying Abdominal Sets x 20  Supine Hamstring sets x 2 mins  Supine Heel slides x 3 mins  SAQ grey roll x 3 mins  Standing heel raise 2 x 10       therapeutic activities to improve functional performance for 25  minutes, including:  Nustep level 1 x 15 mins   Weight shifting in // bars fwd onto left LE x 15  Toe taps x 2 mins  Step Ups   - FWD x 10  -Lateral x 10  Side stepping  x 3 laps  SLS 1  x 30 sec Left LE      Patient Education and Home Exercises       Education provided:   - Caution with sleeping    Written Home Exercises Provided: Patient instructed to cont prior HEP. Exercises were reviewed and Rod was able to  demonstrate them prior to the end of the session.  Rod demonstrated good  understanding of the education provided. See EMR under Patient Instructions for exercises provided during therapy sessions    Assessment   Pt tolerated today's follow up session well. No adverse response. Minimal cues required following initial instruction.    Rod Is progressing well towards his goals.   Pt prognosis is Good.     Pt will continue to benefit from skilled outpatient physical therapy to address the deficits listed in the problem list box on initial evaluation, provide pt/family education and to maximize pt's level of independence in the home and community environment.     Pt's spiritual, cultural and educational needs considered and pt agreeable to plan of care and goals.     Anticipated barriers to physical therapy: None     Goals:   Short Term Goals: 3 weeks   Reduction in pain levels to no more than 1/10 with daily activities for improved ADL performance.   Ambulating with cane at household/community distances   Able to perform sit <>stand with good technique, no pain or compensation   Compliant with HEP      Long Term Goals: 6 weeks   Reduction in pain to 0/10 with return to usual daily activities.   Able to demonstrate functional AROM within restrictions in left hip without increased pain or compensation.   Ambulating 3 blocks without use of AD - no pain or compensation.   Able to perform sit <>stand 8 reps in 30 secs. without compensation.   Able to demonstrate 1/2 grade increase in LE strength for improved functional mobility and activity performance.   FOTO score improved to 68%   Independent with HEP for continued improvement in function.        Plan     Plan of care Certification: 8/2/2023 to 11/2/2023.     Outpatient Physical Therapy 2 times weekly for 6 weeks to include the following interventions: Gait Training, Manual Therapy, Neuromuscular Re-ed, Patient Education, Therapeutic Activities, and Therapeutic  Exercise.     Lucia Altamirano, PT

## 2023-08-10 ENCOUNTER — CLINICAL SUPPORT (OUTPATIENT)
Dept: REHABILITATION | Facility: HOSPITAL | Age: 78
End: 2023-08-10
Payer: MEDICARE

## 2023-08-10 DIAGNOSIS — Z74.09 IMPAIRED FUNCTIONAL MOBILITY, BALANCE, GAIT, AND ENDURANCE: Primary | ICD-10-CM

## 2023-08-10 PROCEDURE — 97110 THERAPEUTIC EXERCISES: CPT | Mod: PN

## 2023-08-10 PROCEDURE — 97530 THERAPEUTIC ACTIVITIES: CPT | Mod: PN

## 2023-08-10 NOTE — PROGRESS NOTES
"[facility]   Physical Therapy Treatment Note      Name: Rod De La Paz  Clinic Number: [id number]    Therapy Diagnosis:   Encounter Diagnosis   Name Primary?    Impaired functional mobility, balance, gait, and endurance Yes     Physician: Luther Fischer MD    Visit Date: 8/10/2023    Physician Orders: PT Eval and Treat   Medical Diagnosis from Referral: Localized primary osteoarthritis of left lower leg   Evaluation Date: 8/2/2023  Authorization Period Expiration: 8/2/2024  Plan of Care Expiration: 11/1/2023  Progress Note Due: 9/2/ 2023   Visit # / Visits authorized: 2 +eval  FOTO: 1/ 3     Precautions: Standard and VLADIMIR precautions        Time In: 10:00 am   Time Out: 11:00 am   Total Billable Time: 53  minutes    PTA Visit #: 0/5       Subjective     Pt reports: Doing well. Just a little soreness.  He was compliant with home exercise program.  Response to previous treatment: No problem  Functional change: --    Pain: 2/10  Location: left hip    Objective      Objective Measures updated at progress report unless specified.     Treatment     Rod received the treatments listed below:      therapeutic exercises to develop strength, endurance, ROM, and core stabilization for 28 minutes including:  Quad sets x 2 mins  Hooklying Ball squeeze x 2 mins  Hooklying Abdominal Sets x 20  Hooklying bridges x 20  Supine Hamstring sets x 2 mins  Supine Heel slides x 3 mins  SAQ grey roll x 3 mins  Standing heel raise 2 x 10   Supine Hip ABD with sliding board x 20   Seated Hamstring curl--  Standing 3 way hip flexion--    therapeutic activities to improve functional performance for 25  minutes, including:  Nustep level 1 x 15 mins   Weight shifting in // bars fwd onto left LE x 15  Toe taps 4" Step x 2 mins  Step Ups   - FWD x 12  -Lateral x 12  Side stepping  x 3 laps  SLS 2  x 30 sec Left LE      Patient Education and Home Exercises       Education provided:   - Caution with sleeping    Written Home " Exercises Provided: Patient instructed to cont prior HEP. Exercises were reviewed and Rod was able to demonstrate them prior to the end of the session.  Rod demonstrated good  understanding of the education provided. See EMR under Patient Instructions for exercises provided during therapy sessions    Assessment   Pt tolerated today's session well. Progressed strength activities as tolerated.No adverse response. Minimal cues required following instruction. Will continue to advance pt as tolerated.    Rod Is progressing well towards his goals.   Pt prognosis is Good.     Pt will continue to benefit from skilled outpatient physical therapy to address the deficits listed in the problem list box on initial evaluation, provide pt/family education and to maximize pt's level of independence in the home and community environment.     Pt's spiritual, cultural and educational needs considered and pt agreeable to plan of care and goals.     Anticipated barriers to physical therapy: None     Goals:   Short Term Goals: 3 weeks   Reduction in pain levels to no more than 1/10 with daily activities for improved ADL performance.   Ambulating with cane at household/community distances   Able to perform sit <>stand with good technique, no pain or compensation   Compliant with HEP      Long Term Goals: 6 weeks   Reduction in pain to 0/10 with return to usual daily activities.   Able to demonstrate functional AROM within restrictions in left hip without increased pain or compensation.   Ambulating 3 blocks without use of AD - no pain or compensation.   Able to perform sit <>stand 8 reps in 30 secs. without compensation.   Able to demonstrate 1/2 grade increase in LE strength for improved functional mobility and activity performance.   FOTO score improved to 68%   Independent with HEP for continued improvement in function.        Plan     Plan of care Certification: 8/2/2023 to 11/2/2023.     Outpatient Physical Therapy 2 times  weekly for 6 weeks to include the following interventions: Gait Training, Manual Therapy, Neuromuscular Re-ed, Patient Education, Therapeutic Activities, and Therapeutic Exercise.     Lucia Altamirano, PT

## 2023-08-15 ENCOUNTER — CLINICAL SUPPORT (OUTPATIENT)
Dept: REHABILITATION | Facility: HOSPITAL | Age: 78
End: 2023-08-15
Payer: MEDICARE

## 2023-08-15 DIAGNOSIS — Z74.09 IMPAIRED FUNCTIONAL MOBILITY, BALANCE, GAIT, AND ENDURANCE: Primary | ICD-10-CM

## 2023-08-15 PROCEDURE — 97110 THERAPEUTIC EXERCISES: CPT | Mod: KX,PN

## 2023-08-15 PROCEDURE — 97530 THERAPEUTIC ACTIVITIES: CPT | Mod: KX,PN

## 2023-08-17 ENCOUNTER — CLINICAL SUPPORT (OUTPATIENT)
Dept: REHABILITATION | Facility: HOSPITAL | Age: 78
End: 2023-08-17
Payer: MEDICARE

## 2023-08-17 DIAGNOSIS — Z74.09 IMPAIRED FUNCTIONAL MOBILITY, BALANCE, GAIT, AND ENDURANCE: Primary | ICD-10-CM

## 2023-08-17 PROCEDURE — 97110 THERAPEUTIC EXERCISES: CPT | Mod: KX,PN

## 2023-08-17 PROCEDURE — 97530 THERAPEUTIC ACTIVITIES: CPT | Mod: KX,PN

## 2023-08-17 NOTE — PROGRESS NOTES
"[facility]   Physical Therapy Treatment Note      Name: Rod De La Paz  Clinic Number: [id number]    Therapy Diagnosis:   Encounter Diagnosis   Name Primary?    Impaired functional mobility, balance, gait, and endurance Yes       Physician: Luther Fischer MD    Visit Date: 8/17/2023    Physician Orders: PT Eval and Treat   Medical Diagnosis from Referral: Localized primary osteoarthritis of left lower leg   Evaluation Date: 8/2/2023  Authorization Period Expiration: 8/2/2024  Plan of Care Expiration: 11/1/2023  Progress Note Due: 9/2/ 2023   Visit # / Visits authorized: 4 +eval  FOTO: 1/ 3     Precautions: Standard and VLADIMIR precautions        Time In: 9:49  am   Time Out: 10:45 am   Total Billable Time: 55 minutes    PTA Visit #: 0/5   *Left VLADIMIR lateral approach    Subjective     Pt reports: Intermittent soreness  He was compliant with home exercise program.  Response to previous treatment: No problem  Functional change: --    Pain: 1/10  Location: left hip    Objective      Objective Measures updated at progress report unless specified.     Treatment     Rod received the treatments listed below:      therapeutic exercises to develop strength, endurance, ROM, and core stabilization for 15 minutes including:  Quad sets x 2 mins  Hooklying Ball squeeze x 2 mins  Hooklying Abdominal Sets x 20  Hooklying bridges  with ball squeeze x 25  Hooklying Left knee fall out  x 15  Supine Hamstring sets x 2 mins  Supine Heel slides x 3 mins  SAQ grey roll x 3 mins  Standing heel raise 3 x 10 no UES   Supine Hip ABD with sliding board x 20   Seated Hamstring curl RTB x 15  Prone HS curls x 20   Standing 3 way hip flexion x 15 ea  Standing mini squats x 15  Supine Hamstring stretch 4 x 15"H    therapeutic activities to improve functional performance for 40 minutes, including:  Nustep level 3 x 16 mins   Forwards and Bwds walking with cues for normalized stride /heel to toe gait without  UES  Toe taps 6" " "Step x 2 mins  Step Ups  4" step-- increase height next session  - FWD x 25  -Lateral x 20  Side stepping  x 3 laps  SLS 3  x 30 sec Left LE        Patient Education and Home Exercises       Education provided:   - Caution with sleeping    Written Home Exercises Provided: Patient instructed to cont prior HEP. Exercises were reviewed and Rod was able to demonstrate them prior to the end of the session.  Rod demonstrated good  understanding of the education provided. See EMR under Patient Instructions for exercises provided during therapy sessions    Assessment   Pt almost 4 weeks post op.  Increased resistance on Nustep. He tolerated today's session well. Increased reps and difficulty as tolerated.Added supine HS stretch and prone quad stretch. No adverse response. Minimal cues required following instruction. Improving gait sequencing working towards DC of QC. Will continue to advance pt as tolerated.    Rod Is progressing well towards his goals.   Pt prognosis is Good.     Pt will continue to benefit from skilled outpatient physical therapy to address the deficits listed in the problem list box on initial evaluation, provide pt/family education and to maximize pt's level of independence in the home and community environment.     Pt's spiritual, cultural and educational needs considered and pt agreeable to plan of care and goals.     Anticipated barriers to physical therapy: None     Goals:   Short Term Goals: 3 weeks   Reduction in pain levels to no more than 1/10 with daily activities for improved ADL performance.   Ambulating with cane at household/community distances   Able to perform sit <>stand with good technique, no pain or compensation   Compliant with HEP      Long Term Goals: 6 weeks   Reduction in pain to 0/10 with return to usual daily activities.   Able to demonstrate functional AROM within restrictions in left hip without increased pain or compensation.   Ambulating 3 blocks without use of AD - " no pain or compensation.   Able to perform sit <>stand 8 reps in 30 secs. without compensation.   Able to demonstrate 1/2 grade increase in LE strength for improved functional mobility and activity performance.   FOTO score improved to 68%   Independent with HEP for continued improvement in function.        Plan     Plan of care Certification: 8/2/2023 to 11/2/2023.     Outpatient Physical Therapy 2 times weekly for 6 weeks to include the following interventions: Gait Training, Manual Therapy, Neuromuscular Re-ed, Patient Education, Therapeutic Activities, and Therapeutic Exercise.     Lucia Altamirano, PT

## 2023-08-21 ENCOUNTER — CLINICAL SUPPORT (OUTPATIENT)
Dept: REHABILITATION | Facility: HOSPITAL | Age: 78
End: 2023-08-21
Payer: MEDICARE

## 2023-08-21 DIAGNOSIS — Z74.09 IMPAIRED FUNCTIONAL MOBILITY, BALANCE, GAIT, AND ENDURANCE: Primary | ICD-10-CM

## 2023-08-21 PROCEDURE — 97110 THERAPEUTIC EXERCISES: CPT | Mod: KX,PN,CQ

## 2023-08-21 PROCEDURE — 97530 THERAPEUTIC ACTIVITIES: CPT | Mod: KX,PN,CQ

## 2023-08-21 NOTE — PROGRESS NOTES
"[facility]   Physical Therapy Treatment Note      Name: Rod De La Paz  Clinic Number: [id number]    Therapy Diagnosis:   Encounter Diagnosis   Name Primary?    Impaired functional mobility, balance, gait, and endurance Yes       Physician: Luther Fischer MD    Visit Date: 8/21/2023    Physician Orders: PT Eval and Treat   Medical Diagnosis from Referral: Localized primary osteoarthritis of left lower leg   Evaluation Date: 8/2/2023  Authorization Period Expiration: 8/2/2024  Plan of Care Expiration: 11/1/2023  Progress Note Due: 9/2/ 2023   Visit # / Visits authorized: 5 +eval  FOTO: 1/ 3     Precautions: Standard and VLADIMIR precautions        Time In: 10:00 AM  Time Out: 11:00 AM  Total Billable Time: 55 minutes    PTA Visit #: 1/5   *Left VLADIMIR lateral approach    Subjective     Pt reports: No new c/o's.   He was compliant with home exercise program.  Response to previous treatment: No problem  Functional change: --    Pain: 1/10  Location: left hip    Objective      Objective Measures updated at progress report unless specified.     Treatment     Rod received the treatments listed below:      therapeutic exercises to develop strength, endurance, ROM, and core stabilization for 15 minutes including:  Quad sets x 2 mins  Hooklying Ball squeeze x 2 mins  Hooklying Abdominal Sets x 20  Hooklying bridges with ball squeeze x 25  Hooklying Left knee fall out  x 15  Supine Hamstring sets x 2 mins  Supine Heel slides x 3 mins  SAQ grey roll x 3 mins  Standing heel raise 3 x 10 no UES   Supine Hip ABD with sliding board x 20   Seated Hamstring curl RTB x 15  Prone HS curls x 20   Standing 3 way hip flexion x 15 ea  Standing mini squats x 15  Supine Hamstring stretch 4 x 15"H    therapeutic activities to improve functional performance for 40 minutes, including:  Nustep level 3 x 16 mins   Forwards and Bwds walking with cues for normalized stride /heel to toe gait without  UES  Toe taps 6" Step x 2 " "mins  Step Ups  6" step  - FWD x 25  -Lateral x 20  Side stepping  x 3 laps  SLS 3  x 30 sec Left LE        Patient Education and Home Exercises       Education provided:   - Caution with sleeping    Written Home Exercises Provided: Patient instructed to cont prior HEP. Exercises were reviewed and Rod was able to demonstrate them prior to the end of the session.  Rod demonstrated good  understanding of the education provided. See EMR under Patient Instructions for exercises provided during therapy sessions    Assessment   He tolerated today's session well. Increased reps and difficulty as tolerated. No adverse response. Minimal cues required following instruction. Will continue to advance pt as tolerated.    Rod Is progressing well towards his goals.   Pt prognosis is Good.     Pt will continue to benefit from skilled outpatient physical therapy to address the deficits listed in the problem list box on initial evaluation, provide pt/family education and to maximize pt's level of independence in the home and community environment.     Pt's spiritual, cultural and educational needs considered and pt agreeable to plan of care and goals.     Anticipated barriers to physical therapy: None     Goals:   Short Term Goals: 3 weeks   Reduction in pain levels to no more than 1/10 with daily activities for improved ADL performance.   Ambulating with cane at household/community distances   Able to perform sit <>stand with good technique, no pain or compensation   Compliant with HEP      Long Term Goals: 6 weeks   Reduction in pain to 0/10 with return to usual daily activities.   Able to demonstrate functional AROM within restrictions in left hip without increased pain or compensation.   Ambulating 3 blocks without use of AD - no pain or compensation.   Able to perform sit <>stand 8 reps in 30 secs. without compensation.   Able to demonstrate 1/2 grade increase in LE strength for improved functional mobility and activity " performance.   FOTO score improved to 68%   Independent with HEP for continued improvement in function.        Plan     Plan of care Certification: 8/2/2023 to 11/2/2023.     Outpatient Physical Therapy 2 times weekly for 6 weeks to include the following interventions: Gait Training, Manual Therapy, Neuromuscular Re-ed, Patient Education, Therapeutic Activities, and Therapeutic Exercise.     Jonathan Favre, PTA

## 2023-08-24 ENCOUNTER — CLINICAL SUPPORT (OUTPATIENT)
Dept: REHABILITATION | Facility: HOSPITAL | Age: 78
End: 2023-08-24
Payer: MEDICARE

## 2023-08-24 DIAGNOSIS — Z74.09 IMPAIRED FUNCTIONAL MOBILITY, BALANCE, GAIT, AND ENDURANCE: Primary | ICD-10-CM

## 2023-08-24 PROCEDURE — 97530 THERAPEUTIC ACTIVITIES: CPT | Mod: KX,PN,CQ

## 2023-08-24 PROCEDURE — 97110 THERAPEUTIC EXERCISES: CPT | Mod: KX,PN,CQ

## 2023-08-24 NOTE — PROGRESS NOTES
"[facility]   Physical Therapy Treatment Note      Name: Rod De La Paz  Clinic Number: [id number]    Therapy Diagnosis:   Encounter Diagnosis   Name Primary?    Impaired functional mobility, balance, gait, and endurance Yes       Physician: Luther Fischer MD    Visit Date: 8/24/2023    Physician Orders: PT Eval and Treat   Medical Diagnosis from Referral: Localized primary osteoarthritis of left lower leg   Evaluation Date: 8/2/2023  Authorization Period Expiration: 8/2/2024  Plan of Care Expiration: 11/1/2023  Progress Note Due: 9/2/ 2023   Visit # / Visits authorized: 6 +eval  FOTO: 1/ 3     Precautions: Standard and VLADIMIR precautions        Time In: 10:00 AM  Time Out: 11:00 AM  Total Billable Time: 55 minutes    PTA Visit #: 2/5   *Left VLADIMIR lateral approach    Subjective     Pt reports: No new c/o's.   He was compliant with home exercise program.  Response to previous treatment: No problem  Functional change: --    Pain: 1/10  Location: left hip    Objective      Objective Measures updated at progress report unless specified.     Treatment     Rod received the treatments listed below:      therapeutic exercises to develop strength, endurance, ROM, and core stabilization for 25 minutes including:  Quad sets x 2 mins  Hooklying Ball squeeze x 2 mins  Hooklying Abdominal Sets x 20  Hooklying bridges with ball squeeze x 25  Hooklying Left knee fall out  x 15  Supine Hamstring sets x 2 mins  Supine Heel slides x 3 mins  SAQ grey roll x 3 mins  Standing heel raise 3 x 10 no UES   Supine Hip ABD with sliding board x 20   Seated Hamstring curl BTB x 15  Prone HS curls x 20   Standing 3 way hip flexion x 15 ea  Standing mini squats x 15  Supine Hamstring stretch 4 x 15"H    therapeutic activities to improve functional performance for 30 minutes, including:  Nustep level 3 x 16 mins   Forwards and Bwds walking with cues for normalized stride /heel to toe gait without  UES  Toe taps 6" Step x 2 " "mins  Step Ups  6" step  - FWD x 25  -Lateral x 20  Side stepping  x 3 laps  SLS 3  x 30 sec Left LE        Patient Education and Home Exercises       Education provided:   - Caution with sleeping    Written Home Exercises Provided: Patient instructed to cont prior HEP. Exercises were reviewed and Rod was able to demonstrate them prior to the end of the session.  Rod demonstrated good  understanding of the education provided. See EMR under Patient Instructions for exercises provided during therapy sessions    Assessment   He tolerated today's session well. Increased reps and difficulty as tolerated. No adverse response. Minimal cues required following instruction. Will continue to advance pt as tolerated.    Rod Is progressing well towards his goals.   Pt prognosis is Good.     Pt will continue to benefit from skilled outpatient physical therapy to address the deficits listed in the problem list box on initial evaluation, provide pt/family education and to maximize pt's level of independence in the home and community environment.     Pt's spiritual, cultural and educational needs considered and pt agreeable to plan of care and goals.     Anticipated barriers to physical therapy: None     Goals:   Short Term Goals: 3 weeks   Reduction in pain levels to no more than 1/10 with daily activities for improved ADL performance.   Ambulating with cane at household/community distances   Able to perform sit <>stand with good technique, no pain or compensation   Compliant with HEP      Long Term Goals: 6 weeks   Reduction in pain to 0/10 with return to usual daily activities.   Able to demonstrate functional AROM within restrictions in left hip without increased pain or compensation.   Ambulating 3 blocks without use of AD - no pain or compensation.   Able to perform sit <>stand 8 reps in 30 secs. without compensation.   Able to demonstrate 1/2 grade increase in LE strength for improved functional mobility and activity " performance.   FOTO score improved to 68%   Independent with HEP for continued improvement in function.        Plan     Plan of care Certification: 8/2/2023 to 11/2/2023.     Outpatient Physical Therapy 2 times weekly for 6 weeks to include the following interventions: Gait Training, Manual Therapy, Neuromuscular Re-ed, Patient Education, Therapeutic Activities, and Therapeutic Exercise.     Jonathan Favre, PTA

## 2023-08-28 ENCOUNTER — CLINICAL SUPPORT (OUTPATIENT)
Dept: REHABILITATION | Facility: HOSPITAL | Age: 78
End: 2023-08-28
Payer: MEDICARE

## 2023-08-28 DIAGNOSIS — Z74.09 IMPAIRED FUNCTIONAL MOBILITY, BALANCE, GAIT, AND ENDURANCE: Primary | ICD-10-CM

## 2023-08-28 PROCEDURE — 97530 THERAPEUTIC ACTIVITIES: CPT | Mod: KX,PN

## 2023-08-28 PROCEDURE — 97110 THERAPEUTIC EXERCISES: CPT | Mod: KX,PN

## 2023-08-28 NOTE — PROGRESS NOTES
"[facility]   Physical Therapy Treatment Note      Name: Rod De La Paz  Clinic Number: [id number]    Therapy Diagnosis:   No diagnosis found.      Physician: Luther Fischer MD    Visit Date: 8/28/2023    Physician Orders: PT Eval and Treat   Medical Diagnosis from Referral: Localized primary osteoarthritis of left lower leg   Evaluation Date: 8/2/2023  Authorization Period Expiration: 8/2/2024  Plan of Care Expiration: 11/1/2023  Progress Note Due: 9/2/ 2023   Visit # / Visits authorized: 7 +eval  FOTO: 1/ 3     Precautions: Standard and VLADIMIR precautions        Time In: 9:56 AM  Time Out: 10:55 AM  Total Billable Time: 55 minutes    PTA Visit #: 2/5   *Left VLADIMIR lateral approach  Sx Date: 7/19/23  Subjective     Pt reports: No new c/o's. I bent down to place the dog bowl on the floor and broke 90 deg but I feel fine.  He was compliant with home exercise program.  Response to previous treatment: No problem  Functional change: --    Pain: 0/10  Location: left hip    Objective      Objective Measures updated at progress report unless specified.     Treatment     Rod received the treatments listed below:      therapeutic exercises to develop strength, endurance, ROM, and core stabilization for 25 minutes including:  Quad sets x 2 mins  Supine SLR X 15  Supine DKTC with RSB (up to 90 deg hip flexion) x 15  Hooklying Ball squeeze x 2 mins  Hooklying Abdominal Sets x 20  Hooklying bridges with ball squeeze x 30  Hooklying Left knee fall out  x 25 3-5"H  SL clams RTB 2 X 10   SL hip ABD X 10  Supine Hamstring sets x 2 mins  Supine Heel slides x 3 mins  SAQ grey roll x 3 mins  Standing heel raise 3 x 10  on wedge  Supine Hip ABD with sliding board x 20   Seated Hamstring curl BTB x 15  Prone HS curls x 20   Standing 3 way hip flexion x 15 ea  Standing hip ext x 15   Standing mini squats x 20  Supine Hamstring stretch 4 x 15"H  Standing Hamstring stretch on step 3 x 30 sec L    therapeutic activities " "to improve functional performance for 30 minutes, including:  Nustep level 4 x 15 mins   Forwards and Bwds walking with cues for normalized stride /heel to toe gait without  UES  Toe taps 6" Step x 2 mins  Step Ups  6" step  - FWD x 25  -Lateral x 20  Side stepping  x 4 laps  SLS 3  x 30 sec Left LE        Patient Education and Home Exercises       Education provided:   - Hold on returning to golf    Written Home Exercises Provided: Patient instructed to cont prior HEP. Exercises were reviewed and Rod was able to demonstrate them prior to the end of the session.  Rod demonstrated good  understanding of the education provided. See EMR under Patient Instructions for exercises provided during therapy sessions    Assessment   5 weeks 5 days post op. He tolerated today's session well. Increased reps and difficulty as tolerated. No adverse response. No pain reported.Minimal cues required following instruction. Pt continues to progress well towards reaching goals. Will continue to advance pt as tolerated.    Rod Is progressing well towards his goals.   Pt prognosis is Good.     Pt will continue to benefit from skilled outpatient physical therapy to address the deficits listed in the problem list box on initial evaluation, provide pt/family education and to maximize pt's level of independence in the home and community environment.     Pt's spiritual, cultural and educational needs considered and pt agreeable to plan of care and goals.     Anticipated barriers to physical therapy: None     Goals:   Short Term Goals: 3 weeks   Reduction in pain levels to no more than 1/10 with daily activities for improved ADL performance.   Ambulating with cane at household/community distances   Able to perform sit <>stand with good technique, no pain or compensation   Compliant with HEP      Long Term Goals: 6 weeks   Reduction in pain to 0/10 with return to usual daily activities.   Able to demonstrate functional AROM within " restrictions in left hip without increased pain or compensation.   Ambulating 3 blocks without use of AD - no pain or compensation.   Able to perform sit <>stand 8 reps in 30 secs. without compensation.   Able to demonstrate 1/2 grade increase in LE strength for improved functional mobility and activity performance.   FOTO score improved to 68%   Independent with HEP for continued improvement in function.        Plan     Plan of care Certification: 8/2/2023 to 11/2/2023.     Outpatient Physical Therapy 2 times weekly for 6 weeks to include the following interventions: Gait Training, Manual Therapy, Neuromuscular Re-ed, Patient Education, Therapeutic Activities, and Therapeutic Exercise.     Lucia Altamirano, PT

## 2023-08-31 ENCOUNTER — CLINICAL SUPPORT (OUTPATIENT)
Dept: REHABILITATION | Facility: HOSPITAL | Age: 78
End: 2023-08-31
Payer: MEDICARE

## 2023-08-31 DIAGNOSIS — Z74.09 IMPAIRED FUNCTIONAL MOBILITY, BALANCE, GAIT, AND ENDURANCE: Primary | ICD-10-CM

## 2023-08-31 PROCEDURE — 97110 THERAPEUTIC EXERCISES: CPT | Mod: KX,PN,CQ

## 2023-08-31 PROCEDURE — 97530 THERAPEUTIC ACTIVITIES: CPT | Mod: KX,PN,CQ

## 2023-08-31 NOTE — PROGRESS NOTES
"[facility]   Physical Therapy Treatment Note      Name: Rod De La Paz  Clinic Number: [id number]    Therapy Diagnosis:   Encounter Diagnosis   Name Primary?    Impaired functional mobility, balance, gait, and endurance Yes         Physician: Luther Fischer MD    Visit Date: 8/31/2023    Physician Orders: PT Eval and Treat   Medical Diagnosis from Referral: Localized primary osteoarthritis of left lower leg   Evaluation Date: 8/2/2023  Authorization Period Expiration: 8/2/2024  Plan of Care Expiration: 11/1/2023  Progress Note Due: 9/2/ 2023   Visit # / Visits authorized: 8 +eval  FOTO: 1/ 3     Precautions: Standard and VLADIMIR precautions        Time In: 10:00 AM  Time Out: 11:00 AM  Total Billable Time: 55 minutes    PTA Visit #: 1/5   *Left VLADIMIR lateral approach  Sx Date: 7/19/23  Subjective     Pt reports: No new c/o's.   He was compliant with home exercise program.  Response to previous treatment: No problem  Functional change: --    Pain: 0/10  Location: left hip    Objective      Objective Measures updated at progress report unless specified.     Treatment     Rod received the treatments listed below:      therapeutic exercises to develop strength, endurance, ROM, and core stabilization for 25 minutes including:  Quad sets x 2 mins  Supine SLR X 15  Supine DKTC with RSB (up to 90 deg hip flexion) x 15  Hooklying Ball squeeze x 2 mins  Hooklying Abdominal Sets x 20  Hooklying bridges with ball squeeze x 30  Hooklying Left knee fall out  x 25 3-5"H  SL clams RTB 2 X 10   SL hip ABD X 10  Supine Heel slides x 3 mins  SAQ grey roll x 3 mins  Standing heel raise 3 x 10  on wedge  Supine Hip ABD with sliding board x 20   Seated Hamstring curl BTB x 15  Prone HS curls x 20   Standing Hip Flex/Abd/Ext x 15 ea   Standing mini squats x 20  Supine Hamstring stretch 4 x 15"H  Standing Hamstring stretch on step 3 x 30 sec L    therapeutic activities to improve functional performance for 30 minutes, " "including:  Nustep level 4 x 15 mins   Forwards and Bwds walking with cues for normalized stride /heel to toe gait without UES  Toe taps 6" Step x 2 mins  Step Ups  6" step  - FWD x 25  -Lateral x 20  Side stepping  x 4 laps  SLS 3  x 30 sec Left LE        Patient Education and Home Exercises       Education provided:   - Hold on returning to golf    Written Home Exercises Provided: Patient instructed to cont prior HEP. Exercises were reviewed and Rod was able to demonstrate them prior to the end of the session.  Rod demonstrated good  understanding of the education provided. See EMR under Patient Instructions for exercises provided during therapy sessions    Assessment   He tolerated today's session well. Increased reps and difficulty as tolerated. No adverse response. No pain reported. Minimal cues required following instruction. Pt continues to progress well towards reaching goals. Will continue to advance pt as tolerated.    Rod Is progressing well towards his goals.   Pt prognosis is Good.     Pt will continue to benefit from skilled outpatient physical therapy to address the deficits listed in the problem list box on initial evaluation, provide pt/family education and to maximize pt's level of independence in the home and community environment.     Pt's spiritual, cultural and educational needs considered and pt agreeable to plan of care and goals.     Anticipated barriers to physical therapy: None     Goals:   Short Term Goals: 3 weeks   Reduction in pain levels to no more than 1/10 with daily activities for improved ADL performance.   Ambulating with cane at household/community distances   Able to perform sit <>stand with good technique, no pain or compensation   Compliant with HEP      Long Term Goals: 6 weeks   Reduction in pain to 0/10 with return to usual daily activities.   Able to demonstrate functional AROM within restrictions in left hip without increased pain or compensation.   Ambulating 3 " blocks without use of AD - no pain or compensation.   Able to perform sit <>stand 8 reps in 30 secs. without compensation.   Able to demonstrate 1/2 grade increase in LE strength for improved functional mobility and activity performance.   FOTO score improved to 68%   Independent with HEP for continued improvement in function.        Plan     Plan of care Certification: 8/2/2023 to 11/2/2023.     Outpatient Physical Therapy 2 times weekly for 6 weeks to include the following interventions: Gait Training, Manual Therapy, Neuromuscular Re-ed, Patient Education, Therapeutic Activities, and Therapeutic Exercise.     Jonathan Favre, PTA

## 2023-09-05 ENCOUNTER — CLINICAL SUPPORT (OUTPATIENT)
Dept: REHABILITATION | Facility: HOSPITAL | Age: 78
End: 2023-09-05
Payer: MEDICARE

## 2023-09-05 DIAGNOSIS — Z74.09 IMPAIRED FUNCTIONAL MOBILITY, BALANCE, GAIT, AND ENDURANCE: Primary | ICD-10-CM

## 2023-09-05 PROCEDURE — 97110 THERAPEUTIC EXERCISES: CPT | Mod: KX,PN

## 2023-09-05 PROCEDURE — 97530 THERAPEUTIC ACTIVITIES: CPT | Mod: KX,PN

## 2023-09-05 NOTE — PROGRESS NOTES
12[facility]   Physical Therapy Treatment Note / Progress Note      Name: Rod De La Paz  Clinic Number: [id number]    Therapy Diagnosis:   Encounter Diagnosis   Name Primary?    Impaired functional mobility, balance, gait, and endurance Yes         Physician: Luther Fischer MD    Visit Date: 9/5/2023    Physician Orders: PT Eval and Treat   Medical Diagnosis from Referral: Localized primary osteoarthritis of left lower leg   Evaluation Date: 8/2/2023  Authorization Period Expiration: 8/2/2024  Plan of Care Expiration: 11/1/2023  Progress Note Due: 10/2/2023    Visit # / Visits authorized: 9 +eval  FOTO: 2 / 3     Precautions: Standard and VLADIMIR precautions        Time In: 11:00 AM  Time Out: 12:00 PM  Total Billable Time: 55 minutes    PTA Visit #: 0/5   *Left VLADIMIR lateral approach  Sx Date: 7/19/23  Subjective     Pt reports: No new c/o's; got a full release from MD to start progressing activities; still needs to maintain hip precautions until 6 months post-op.   He was compliant with home exercise program.  Response to previous treatment: No problem  Functional change: walking without use of any AD;     Pain: 0/10  Location: left hip    Objective      Objective Measures updated at progress report unless specified.     Hip Range of Motion:    Left active Left Passive Right active  Right Passive   Flexion 75 > 90  85 > 90 WFL WFL   Abduction 15 > 20 18 > 20  WFL WFL   Extension 5 > 8 5 > 10  WFL WFL   Ext. Rotation 10 > 20 25 > 27 WFL WFL   Int. Rotation NT NT  WFL WFL             Lower Extremity Strength    Right LE Left LE   Knee extension: 4+/5 4-/5  > 4/5   Knee flexion: 4/5 ? 4+/5  4-/5 > 4+/5    Hip flexion: 5/5 4/5 > 4/5    Hip Internal Rotation:  4/5    NT      Hip External Rotation: 4+/5    3-/5 > 4-/5       Hip extension:  4/5 3+/5 > 4-/5   Hip abduction: 4/5 3-/5 > 3/5   Hip adduction: 4/5 3+/5 > 3+/5    Ankle dorsiflexion: 5/5 5/5   Ankle plantarflexion: 4/5 3+/5 > 4/5       SLS:  "Firm 6 sec,   > 12 sec   Toe Tap test: 4 reps on 6" step with unaffecting leg in 15 seconds > 8 reps   30 sec sit <> stand from elevated surface: 9 reps, good stability noted   Step-ups: 4" step > 6" step with slight UE assist           Treatment     Rod received the treatments listed below:      therapeutic exercises to develop strength, endurance, ROM, and core stabilization for 25 minutes including:  Quad sets x 2 mins  Supine SLR X 15  Supine DKTC with RSB (up to 90 deg hip flexion) x 15  Hooklying Ball squeeze x 2 mins  Hooklying Abdominal Sets x 20  Hooklying bridges with ball squeeze x 30  Hooklying Left knee fall out  x 25 3-5"H  SL clams RTB 2 X 10   SL hip ABD X 10  Supine Heel slides x 3 mins  SAQ grey roll x 3 mins  Standing heel raise 3 x 10  on wedge  Supine Hip ABD with sliding board x 20   Seated Hamstring curl BTB x 15  Prone HS curls x 20   Standing Hip Flex/Abd/Ext x 15 ea   Standing mini squats x 20  Supine Hamstring stretch 4 x 15"H  Standing Hamstring stretch on step 3 x 30 sec L    therapeutic activities to improve functional performance for 30 minutes, including:  Recumbent Bike  x 10  mins   Forwards and Bwds walking with cues for normalized stride /heel to toe gait without UES  Toe taps 6" Step x 2 mins  Step Ups  6" step  - FWD x 25  -Lateral x 20  Side stepping  x 4 laps  SLS 3  x 30 sec Left LE        Patient Education and Home Exercises       Education provided:   - will start chip/putting now with MD release     Written Home Exercises Provided: Patient instructed to cont prior HEP. Exercises were reviewed and Rod was able to demonstrate them prior to the end of the session.  Rod demonstrated good  understanding of the education provided. See EMR under Patient Instructions for exercises provided during therapy sessions    Assessment   He tolerated today's session well. Increased reps and difficulty as tolerated. Progressed to use of Recumbent Bike - No adverse response. No pain " reported. Minimal cues required following instruction. Pt continues to progress well towards reaching goals. Will continue to advance pt as tolerated.    Rod Is progressing well towards his goals.   Pt prognosis is Good.     Pt will continue to benefit from skilled outpatient physical therapy to address the deficits listed in the problem list box on initial evaluation, provide pt/family education and to maximize pt's level of independence in the home and community environment.     Pt's spiritual, cultural and educational needs considered and pt agreeable to plan of care and goals.     Anticipated barriers to physical therapy: None     Goals:   Short Term Goals: 3 weeks   Reduction in pain levels to no more than 1/10 with daily activities for improved ADL performance.  > MET   Ambulating with cane at household/community distances  > MET   Able to perform sit <>stand with good technique, no pain or compensation  > MET   Compliant with HEP > MET      Long Term Goals: 6 weeks   Reduction in pain to 0/10 with return to usual daily activities. > Ongoing, but MET for today   Able to demonstrate functional AROM within restrictions in left hip without increased pain or compensation.  > Good Progress   Ambulating 3 blocks without use of AD - no pain or compensation. > Good Progress   Able to perform sit <>stand 8 reps in 30 secs. without compensation.  > MET   Able to demonstrate 1/2 grade increase in LE strength for improved functional mobility and activity performance. > Ongoing   FOTO score improved to 68%  > Good progress - improved from 40 > 56% max function   Independent with HEP for continued improvement in function. > Ongoing        Plan     Plan of care Certification: 8/2/2023 to 11/2/2023.     Outpatient Physical Therapy 2 times weekly for 6 weeks to include the following interventions: Gait Training, Manual Therapy, Neuromuscular Re-ed, Patient Education, Therapeutic Activities, and Therapeutic Exercise.     Rose  Favian, PT

## 2023-09-07 ENCOUNTER — CLINICAL SUPPORT (OUTPATIENT)
Dept: REHABILITATION | Facility: HOSPITAL | Age: 78
End: 2023-09-07
Payer: MEDICARE

## 2023-09-07 DIAGNOSIS — Z74.09 IMPAIRED FUNCTIONAL MOBILITY, BALANCE, GAIT, AND ENDURANCE: Primary | ICD-10-CM

## 2023-09-07 PROCEDURE — 97530 THERAPEUTIC ACTIVITIES: CPT | Mod: KX,PN

## 2023-09-07 PROCEDURE — 97110 THERAPEUTIC EXERCISES: CPT | Mod: KX,PN

## 2023-09-07 NOTE — PROGRESS NOTES
"[facility]   Physical Therapy Treatment Note / Progress Note      Name: Rod De La Paz  Clinic Number: [id number]    Therapy Diagnosis:   Encounter Diagnosis   Name Primary?    Impaired functional mobility, balance, gait, and endurance Yes       Physician: Luther Fischer MD    Visit Date: 9/7/2023    Physician Orders: PT Eval and Treat   Medical Diagnosis from Referral: Localized primary osteoarthritis of left lower leg   Evaluation Date: 8/2/2023  Authorization Period Expiration: 8/2/2024  Plan of Care Expiration: 11/1/2023  Progress Note Due: 10/2/2023    Visit # / Visits authorized: 10  +eval  FOTO: 2 / 3     Precautions: Standard and VLADIMIR precautions        Time In: 10:00 AM  Time Out: 11:00 am    Total Billable Time: 55 minutes    PTA Visit #: 0/5   *Left VLADIMIR lateral approach  Sx Date: 7/19/23  Subjective     Pt reports: No new c/o's; had a golf lesson Tuesday - chipping/putting; went well;   He was compliant with home exercise program.  Response to previous treatment: good   Functional change: able to get his socks on Indep this morning; working on being able to tie his shoes with less effort.      Pain: 0/10  Location: left hip    Objective      Objective Measures updated at progress report unless specified.     Treatment     Rod received the treatments listed below:      therapeutic exercises to develop strength, endurance, ROM, and core stabilization for 30 minutes including:  Quad sets x 2 mins  Supine SLR X 15  Hooklying Ball squeeze x 2 mins  Hooklying Abdominal Sets x 20  Hooklying bridges with ball squeeze x 30  Hooklying Left knee fall out  x 25 3-5"H  SL clams  x 15 - pillow between knees   SL hip ABD X 10 - pillow between knees; assist to maintain good position   Supine Heel slides x 3 mins  SAQ grey roll x 2 mins - 5# on LLE   Standing heel raise 3 x 10  on wedge  Seated Hamstring curl BTB x 15  Prone HS curls x 20   Standing Hip Flex/Abd/Ext x 15 ea  - added green band " "  Standing Hamstring stretch on step 3 x 30 sec L    therapeutic activities to improve functional performance for 25 minutes, including:  Recumbent Bike  x 15  mins   Toe taps 8" Step x 2 mins - progress to Air-Ex next visit   Step Ups  6" step  - FWD x 25 - leading with each leg - full knee extension on left   -Lateral x 25  Step down/bkwds - 4" step - down with RLE, step backwards up with left x 20   SLS 3  x 30 sec Left LE  Squats: maintain hip flex precautions x 20         Patient Education and Home Exercises       Education provided:   - will start chip/putting now with MD release     Written Home Exercises Provided: Patient instructed to cont prior HEP. Exercises were reviewed and Rod was able to demonstrate them prior to the end of the session.  Rod demonstrated good  understanding of the education provided. See EMR under Patient Instructions for exercises provided during therapy sessions    Assessment   He tolerated today's session well. Increased reps and difficulty of activities performed without adverse response; No pain reported. Minimal cues required following instruction. Pt continues to progress well towards reaching goals. Will continue to advance pt as tolerated.    Rod Is progressing well towards his goals.   Pt prognosis is Good.     Pt will continue to benefit from skilled outpatient physical therapy to address the deficits listed in the problem list box on initial evaluation, provide pt/family education and to maximize pt's level of independence in the home and community environment.     Pt's spiritual, cultural and educational needs considered and pt agreeable to plan of care and goals.     Anticipated barriers to physical therapy: None     Goals:   Short Term Goals: 3 weeks   Reduction in pain levels to no more than 1/10 with daily activities for improved ADL performance.  > MET   Ambulating with cane at household/community distances  > MET   Able to perform sit <>stand with good " technique, no pain or compensation  > MET   Compliant with HEP > MET      Long Term Goals: 6 weeks   Reduction in pain to 0/10 with return to usual daily activities. > Ongoing, but MET for today   Able to demonstrate functional AROM within restrictions in left hip without increased pain or compensation.  > Good Progress   Ambulating 3 blocks without use of AD - no pain or compensation. > Good Progress   Able to perform sit <>stand 8 reps in 30 secs. without compensation.  > MET   Able to demonstrate 1/2 grade increase in LE strength for improved functional mobility and activity performance. > Ongoing   FOTO score improved to 68%  > Good progress - improved from 40 > 56% max function   Independent with HEP for continued improvement in function. > Ongoing        Plan     Plan of care Certification: 8/2/2023 to 11/2/2023.     Outpatient Physical Therapy 2 times weekly for 6 weeks to include the following interventions: Gait Training, Manual Therapy, Neuromuscular Re-ed, Patient Education, Therapeutic Activities, and Therapeutic Exercise.     Rose Ballesteros, PT

## 2023-09-11 ENCOUNTER — CLINICAL SUPPORT (OUTPATIENT)
Dept: REHABILITATION | Facility: HOSPITAL | Age: 78
End: 2023-09-11
Payer: MEDICARE

## 2023-09-11 DIAGNOSIS — Z74.09 IMPAIRED FUNCTIONAL MOBILITY, BALANCE, GAIT, AND ENDURANCE: Primary | ICD-10-CM

## 2023-09-11 PROCEDURE — 97110 THERAPEUTIC EXERCISES: CPT | Mod: KX,PN

## 2023-09-11 PROCEDURE — 97530 THERAPEUTIC ACTIVITIES: CPT | Mod: KX,PN

## 2023-09-11 NOTE — PROGRESS NOTES
"[facility]   Physical Therapy Treatment Note / Progress Note      Name: Rod De La Paz  Clinic Number: [id number]    Therapy Diagnosis:   Encounter Diagnosis   Name Primary?    Impaired functional mobility, balance, gait, and endurance Yes       Physician: Luther Fischer MD    Visit Date: 9/11/2023    Physician Orders: PT Eval and Treat   Medical Diagnosis from Referral: Localized primary osteoarthritis of left lower leg   Evaluation Date: 8/2/2023  Authorization Period Expiration: 8/2/2024  Plan of Care Expiration: 11/1/2023  Progress Note Due: 10/2/2023    Visit # / Visits authorized: 11  +eval  FOTO: 2 / 3     Precautions: Standard and VLADIMIR precautions        Time In: 10:00 AM  Time Out: 11:10  am    Total Billable Time: 55 minutes    PTA Visit #: 0/5   *Left VLADIMIR lateral approach  Sx Date: 7/19/23  Subjective     Pt reports: No new c/o's; progressing well at home with activities   He was compliant with home exercise program.  Response to previous treatment: good   Functional change: chipping/putting without any increased hip pain     Pain: 0/10  Location: left hip    Objective      Objective Measures updated at progress report unless specified.     Treatment     Rod received the treatments listed below:      therapeutic exercises to develop strength, endurance, ROM, and core stabilization for 30 minutes including:  Quad sets x 2 mins  Supine SLR X 15  Hooklying Ball squeeze x 2 mins  Hooklying Abdominal Sets x 20  Hooklying bridges with ball squeeze x 30  Hooklying Left knee fall out  x 25 3-5"H  SL clams  x 15 - pillow between knees   SL hip ABD X 10 - pillow between knees; assist to maintain good position   Supine Heel slides x 3 mins   Standing heel raise 3 x 10  on wedge  Prone HS curls x 20   Standing Hip Flex/Abd/Ext x 15 ea  - added green band   Standing Hamstring stretch on step 3 x 30 sec L  Quantum knee flex/ext  Flexion: 30 # x 20 - single leg at a time  Extension: 30# x 20 " "single leg at a time.     therapeutic activities to improve functional performance for 25 minutes, including:  Recumbent Bike  x 15  mins   Toe taps 8" Step x 2 mins - standing on Air-Ex  Step-on/off Air Ex mat without UE assist x 2 min  (front/back and side<>side)   Step Ups  6" step  - FWD x 25 - leading with each leg - full knee extension on left   -Lateral x 25  Step down/bkwds - 4" step - down with RLE, step backwards up with left x 20   SLS 3  x 30 sec Left LE  Squats: maintain hip flex precautions x 20         Patient Education and Home Exercises       Education provided:   -update of exercises to work on balance   - will start chip/putting now with MD release     Written Home Exercises Provided: Patient instructed to cont prior HEP. Exercises were reviewed and Rod was able to demonstrate them prior to the end of the session.  Rod demonstrated good  understanding of the education provided. See EMR under Patient Instructions for exercises provided during therapy sessions    Assessment   He tolerated today's session well. Increased reps and difficulty of activities performed without adverse response; Added balance challenges to prepare him for higher level recreational activities; No pain reported. Minimal cues required following instruction. Pt continues to progress well towards reaching goals. Will continue to advance pt as tolerated.    Rod Is progressing well towards his goals.   Pt prognosis is Good.     Pt will continue to benefit from skilled outpatient physical therapy to address the deficits listed in the problem list box on initial evaluation, provide pt/family education and to maximize pt's level of independence in the home and community environment.     Pt's spiritual, cultural and educational needs considered and pt agreeable to plan of care and goals.     Anticipated barriers to physical therapy: None     Goals:   Short Term Goals: 3 weeks   Reduction in pain levels to no more than 1/10 with " daily activities for improved ADL performance.  > MET   Ambulating with cane at household/community distances  > MET   Able to perform sit <>stand with good technique, no pain or compensation  > MET   Compliant with HEP > MET      Long Term Goals: 6 weeks   Reduction in pain to 0/10 with return to usual daily activities. > Ongoing, but MET for today   Able to demonstrate functional AROM within restrictions in left hip without increased pain or compensation.  > Good Progress   Ambulating 3 blocks without use of AD - no pain or compensation. > Good Progress   Able to perform sit <>stand 8 reps in 30 secs. without compensation.  > MET   Able to demonstrate 1/2 grade increase in LE strength for improved functional mobility and activity performance. > Ongoing   FOTO score improved to 68%  > Good progress - improved from 40 > 56% max function   Independent with HEP for continued improvement in function. > Ongoing        Plan     Plan of care Certification: 8/2/2023 to 11/2/2023.     Outpatient Physical Therapy 2 times weekly for 6 weeks to include the following interventions: Gait Training, Manual Therapy, Neuromuscular Re-ed, Patient Education, Therapeutic Activities, and Therapeutic Exercise.     Rose Ballesteros, PT

## 2023-09-15 ENCOUNTER — CLINICAL SUPPORT (OUTPATIENT)
Dept: REHABILITATION | Facility: HOSPITAL | Age: 78
End: 2023-09-15
Payer: MEDICARE

## 2023-09-15 DIAGNOSIS — Z74.09 IMPAIRED FUNCTIONAL MOBILITY, BALANCE, GAIT, AND ENDURANCE: Primary | ICD-10-CM

## 2023-09-15 PROCEDURE — 97110 THERAPEUTIC EXERCISES: CPT | Mod: KX,PN

## 2023-09-15 PROCEDURE — 97530 THERAPEUTIC ACTIVITIES: CPT | Mod: KX,PN

## 2023-09-15 NOTE — PROGRESS NOTES
"[facility]   Physical Therapy Treatment Note / Progress Note      Name: Rod De La Paz  Clinic Number: [id number]    Therapy Diagnosis:   No diagnosis found.      Physician: Luther Fischer MD    Visit Date: 9/15/2023    Physician Orders: PT Eval and Treat   Medical Diagnosis from Referral: Localized primary osteoarthritis of left lower leg   Evaluation Date: 8/2/2023  Authorization Period Expiration: 8/2/2024  Plan of Care Expiration: 11/1/2023  Progress Note Due: 10/2/2023    Visit # / Visits authorized: 12  +eval  FOTO: 2 / 3     Precautions: Standard and VLADIMIR precautions        Time In: 2:30 pm   Time Out: 3:30 pm   Total Billable Time: 55 minutes    PTA Visit #: 0/5   *Left VLADIMIR lateral approach  Sx Date: 7/19/23  Subjective     Pt reports: No new c/o's; progressing well at home with activities, did a little more chipping/putting today without any increase in hip pain   He was compliant with home exercise program.  Response to previous treatment: good   Functional change: chipping/putting without any increased hip pain     Pain: 0/10  Location: left hip    Objective      Objective Measures updated at progress report unless specified.     Treatment     Rod received the treatments listed below:      therapeutic exercises to develop strength, endurance, ROM, and core stabilization for 30 minutes including:  Quad sets x 2 mins  Supine SLR X 15  Hooklying Ball squeeze x 2 mins  Hooklying Abdominal Sets x 20  Hooklying bridges with ball squeeze x 30  Hooklying Left knee fall out  x 25 3-5"H  SL clams  x 15 - pillow between knees   SL hip ABD X 10 - pillow between knees; assist to maintain good position   Supine Heel slides x 3 mins   Standing heel raise 3 x 10  on wedge  Prone HS curls x 20   Standing Hip Flex/Abd/Ext x 15 ea  - added green band   Standing Hamstring stretch on step 3 x 30 sec L  Quantum knee flex/ext  Flexion: 30 # x 20 - single leg at a time  Extension: 30# x 20 single leg " "at a time.     therapeutic activities to improve functional performance for 25 minutes, including:  Recumbent Bike  x 15  mins   Toe taps 8" Step x 2 mins - standing on Air-Ex  Step-on/off Air Ex mat without UE assist x 2 min  (front/back and side<>side)   Step Ups  6" step  - FWD x 25 - leading with each leg - full knee extension on left   -Lateral x 25  Step down/bkwds - 4" step - down with RLE, step backwards up with left x 20   SLS 3  x 30 sec Left LE  Squats: maintain hip flex precautions x 20         Patient Education and Home Exercises       Education provided:   -update of exercises to work on balance   - will start chip/putting now with MD release     Written Home Exercises Provided: Patient instructed to cont prior HEP. Exercises were reviewed and Rod was able to demonstrate them prior to the end of the session.  Rod demonstrated good  understanding of the education provided. See EMR under Patient Instructions for exercises provided during therapy sessions    Assessment   He tolerated today's session well. Increased reps and difficulty of activities performed without adverse response; Added balance challenges to prepare him for higher level recreational activities; No pain reported. Minimal cues required following instruction. Pt continues to progress well towards reaching goals. Will continue to advance pt as tolerated.    Rod Is progressing well towards his goals.   Pt prognosis is Good.     Pt will continue to benefit from skilled outpatient physical therapy to address the deficits listed in the problem list box on initial evaluation, provide pt/family education and to maximize pt's level of independence in the home and community environment.     Pt's spiritual, cultural and educational needs considered and pt agreeable to plan of care and goals.     Anticipated barriers to physical therapy: None     Goals:   Short Term Goals: 3 weeks   Reduction in pain levels to no more than 1/10 with daily " activities for improved ADL performance.  > MET   Ambulating with cane at household/community distances  > MET   Able to perform sit <>stand with good technique, no pain or compensation  > MET   Compliant with HEP > MET      Long Term Goals: 6 weeks   Reduction in pain to 0/10 with return to usual daily activities. > Ongoing, but MET for today   Able to demonstrate functional AROM within restrictions in left hip without increased pain or compensation.  > Good Progress   Ambulating 3 blocks without use of AD - no pain or compensation. > Good Progress   Able to perform sit <>stand 8 reps in 30 secs. without compensation.  > MET   Able to demonstrate 1/2 grade increase in LE strength for improved functional mobility and activity performance. > Ongoing   FOTO score improved to 68%  > Good progress - improved from 40 > 56% max function   Independent with HEP for continued improvement in function. > Ongoing        Plan     Plan of care Certification: 8/2/2023 to 11/2/2023.     Outpatient Physical Therapy 2 times weekly for 6 weeks to include the following interventions: Gait Training, Manual Therapy, Neuromuscular Re-ed, Patient Education, Therapeutic Activities, and Therapeutic Exercise.     Rose Ballesteros, PT

## 2023-09-21 ENCOUNTER — CLINICAL SUPPORT (OUTPATIENT)
Dept: REHABILITATION | Facility: HOSPITAL | Age: 78
End: 2023-09-21
Payer: MEDICARE

## 2023-09-21 DIAGNOSIS — Z74.09 IMPAIRED FUNCTIONAL MOBILITY, BALANCE, GAIT, AND ENDURANCE: Primary | ICD-10-CM

## 2023-09-21 PROCEDURE — 97530 THERAPEUTIC ACTIVITIES: CPT | Mod: KX,PN

## 2023-09-21 PROCEDURE — 97110 THERAPEUTIC EXERCISES: CPT | Mod: KX,PN

## 2023-09-21 NOTE — PROGRESS NOTES
"[facility]   Physical Therapy Treatment Note      Name: Rod De La Paz  Clinic Number: [id number]    Therapy Diagnosis:   Encounter Diagnosis   Name Primary?    Impaired functional mobility, balance, gait, and endurance Yes         Physician: Luther Fischer MD    Visit Date: 9/21/2023    Physician Orders: PT Eval and Treat   Medical Diagnosis from Referral: Localized primary osteoarthritis of left lower leg   Evaluation Date: 8/2/2023  Authorization Period Expiration: 8/2/2024  Plan of Care Expiration: 11/1/2023  Progress Note Due: 10/2/2023    Visit # / Visits authorized: 13  +eval  FOTO: 2 / 3     Precautions: Standard and VLADIMIR precautions        Time In: 12:30 pm   Time Out: 1:30 pm   Total Billable Time: 60 minutes    PTA Visit #: 0/5   *Left VLADIMIR lateral approach  Sx Date: 7/19/23  Subjective     Pt reports: No new c/o's; progressing well at home with activities, did a little more chipping/putting today some soreness of the low back .  He was compliant with home exercise program.  Response to previous treatment: good   Functional change: chipping/putting without any increased hip pain     Pain: 0/10  Location: left hip    Objective      Objective Measures updated at progress report unless specified.     Treatment     Rod received the treatments listed below:      therapeutic exercises to develop strength, endurance, ROM, and core stabilization for 30 minutes including:  Quad sets x 2 mins  Supine hamstring stretch w/ Strap 2 x 30"  Supine SLR X 15  Hooklying Ball squeeze x 2 mins  Hooklying Abdominal Sets x 20  Hooklying bridges with ball squeeze x 30  Hooklying Left knee fall out  x 25 3-5"H  SL clams  x 15 - pillow between knees   SL hip ABD X 10 - pillow between knees; assist to maintain good position   Supine Heel slides x 3 mins   Standing heel raise 3 x 10  on wedge  Prone HS curls x 20   Standing Hip Flex/Abd/Ext x 15 ea  - added green band   Standing Hamstring stretch on step " "3 x 30 sec L  Quantum knee flex/ext  Flexion: 30 # x 30 - single leg at a time  Extension: 30# x 30 single leg at a time.     Quantum leg press 60 lbs single leg x 15      therapeutic activities to improve functional performance for 30 minutes, including:  Recumbent Bike  x 15  mins   Toe taps 8" Step x 2 mins - standing on Air-Ex  Step-on/off Air Ex mat without UE assist x 2 min  (front/back and side<>side)   Step Ups  6" step  - FWD x 25 - leading with each leg - full knee extension on left   -Lateral x 25  Step down/bkwds - 4" step - down with RLE, step backwards up with left x 20   SLS 3  x 30 sec Left LE  Tandem on foam 4 x 30" alternating   Squats: maintain hip flex precautions x 20         Patient Education and Home Exercises       Education provided:   -update of exercises to work on balance   - will start chip/putting now with MD release     Written Home Exercises Provided: Patient instructed to cont prior HEP. Exercises were reviewed and Rod was able to demonstrate them prior to the end of the session.  Rod demonstrated good  understanding of the education provided. See EMR under Patient Instructions for exercises provided during therapy sessions    Assessment   He tolerated today's session well. Continued to progress balance and strengthening as tolerated to prepare him for higher level recreational activities; No pain reported. Introduced leg single limb leg press to progress quad strengthening. Minimal cues required following instruction. Pt continues to progress well towards reaching goals. Will continue to advance pt as tolerated.    Rod Is progressing well towards his goals.   Pt prognosis is Good.     Pt will continue to benefit from skilled outpatient physical therapy to address the deficits listed in the problem list box on initial evaluation, provide pt/family education and to maximize pt's level of independence in the home and community environment.     Pt's spiritual, cultural and " educational needs considered and pt agreeable to plan of care and goals.     Anticipated barriers to physical therapy: None     Goals:   Short Term Goals: 3 weeks   Reduction in pain levels to no more than 1/10 with daily activities for improved ADL performance.  > MET   Ambulating with cane at household/community distances  > MET   Able to perform sit <>stand with good technique, no pain or compensation  > MET   Compliant with HEP > MET      Long Term Goals: 6 weeks   Reduction in pain to 0/10 with return to usual daily activities. > Ongoing, but MET for today   Able to demonstrate functional AROM within restrictions in left hip without increased pain or compensation.  > Good Progress   Ambulating 3 blocks without use of AD - no pain or compensation. > Good Progress   Able to perform sit <>stand 8 reps in 30 secs. without compensation.  > MET   Able to demonstrate 1/2 grade increase in LE strength for improved functional mobility and activity performance. > Ongoing   FOTO score improved to 68%  > Good progress - improved from 40 > 56% max function   Independent with HEP for continued improvement in function. > Ongoing        Plan     Plan of care Certification: 8/2/2023 to 11/2/2023.     Outpatient Physical Therapy 2 times weekly for 6 weeks to include the following interventions: Gait Training, Manual Therapy, Neuromuscular Re-ed, Patient Education, Therapeutic Activities, and Therapeutic Exercise.     Lucia Altamirano, PT

## 2023-09-28 ENCOUNTER — CLINICAL SUPPORT (OUTPATIENT)
Dept: REHABILITATION | Facility: HOSPITAL | Age: 78
End: 2023-09-28
Payer: MEDICARE

## 2023-09-28 DIAGNOSIS — Z74.09 IMPAIRED FUNCTIONAL MOBILITY, BALANCE, GAIT, AND ENDURANCE: Primary | ICD-10-CM

## 2023-09-28 PROCEDURE — 97110 THERAPEUTIC EXERCISES: CPT | Mod: KX,PN

## 2023-09-28 PROCEDURE — 97530 THERAPEUTIC ACTIVITIES: CPT | Mod: KX,PN

## 2023-09-28 NOTE — PROGRESS NOTES
[facility]   Physical Therapy Treatment Note / Discharge Note      Name: Rod De La Paz  Clinic Number: [id number]    Therapy Diagnosis:   Encounter Diagnosis   Name Primary?    Impaired functional mobility, balance, gait, and endurance Yes         Physician: Luther Fischer MD    Visit Date: 9/28/2023    Physician Orders: PT Eval and Treat   Medical Diagnosis from Referral: Localized primary osteoarthritis of left lower leg   Evaluation Date: 8/2/2023  Authorization Period Expiration: 8/2/2024  Plan of Care Expiration: 11/1/2023  Progress Note Due: 10/2/2023    Visit # / Visits authorized: 14  +eval  FOTO: 2 / 3     Precautions: Standard and VLADIMIR precautions        Time In: 10:00 am   Time Out: 11:00 am    Total Billable Time: 60 minutes    PTA Visit #: 0/5   *Left VLADIMIR lateral approach  Sx Date: 7/19/23  Subjective     Pt reports: No issues noted; hip feels good; today is last PT visit; patient is doing very well, returned to his PLOF as far as daily and recreational activities are concerned.  He feels comfortable returning to [employer] to continue his work-outs.   He was compliant with home exercise program.  Response to previous treatment: good   Functional change: doing everything he needs to do, following all hip precautions, playing golf.     Pain: 0/10  Location: left hip    Objective      Objective Measures updated at progress report unless specified.   Hip Range of Motion:    Left active Left Passive Right active  Right Passive   Flexion 75 > 95  85 > 105 WFL WFL   Abduction 15 > 20 18 > 20  WFL WFL   Extension 5 > 8 5 > 10  WFL WFL   Ext. Rotation 10 > 20 25 > 27 WFL WFL   Int. Rotation NT NT  WFL WFL             Lower Extremity Strength    Right LE Left LE   Knee extension: 4+/5 > 5/5 4-/5  > 4+/5   Knee flexion: 4/5 > 4+/5  4-/5 > 4+/5    Hip flexion: 5/5 4/5 > 4/+5    Hip Internal Rotation:  4/5    NT      Hip External Rotation: 4+/5    3-/5 > 4/5       Hip extension:  4/5 3+/5  "> 4/5   Hip abduction: 4/5 3-/5 > 3+/5   Hip adduction: 4/5 3+/5 > 4/5   Ankle dorsiflexion: 5/5 5/5   Ankle plantarflexion: 4/5 3+/5 > 4/5       SLS: Firm 6 sec,   > 20 sec   Toe Tap test: 4 reps on 6" step with unaffecting leg in 15 seconds > 8 reps   30 sec sit <> stand from elevated surface: 9 reps, good stability noted   Step-ups: 4" step > 6" step with slight UE assist      FOTO:  81% max function          Treatment     Rod received the treatments listed below:      therapeutic exercises to develop strength, endurance, ROM, and core stabilization for 30 minutes including:  Quad sets x 2 mins  Supine hamstring stretch w/ Strap 2 x 30"  Supine SLR X 15  Hooklying Ball squeeze x 2 mins  Hooklying Abdominal Sets x 20  Hooklying bridges with ball squeeze x 30  Hooklying Left knee fall out  x 25 3-5"H  SL clams  x 15 - pillow between knees   SL hip ABD X 10 - pillow between knees; assist to maintain good position   Supine Heel slides x 3 mins   Standing heel raise 3 x 10  on wedge  Prone HS curls x 20   Standing Hip Flex/Abd/Ext x 15 ea  - added green band   Standing Hamstring stretch on step 3 x 30 sec L  Quantum knee flex/ext  Flexion: 30 # x 30 - single leg at a time  Extension: 30# x 30 single leg at a time.     Quantum leg press 60 lbs single leg x 15      therapeutic activities to improve functional performance for 30 minutes, including:  Recumbent Bike  x 15  mins   Toe taps 8" Step x 2 mins - standing on Air-Ex  Step-on/off Air Ex mat without UE assist x 2 min  (front/back and side<>side)   Step Ups  6" step  - FWD x 25 - leading with each leg - full knee extension on left   -Lateral x 25  Step down/bkwds - 4" step - down with RLE, step backwards up with left x 20   Heel taps from 4" step x 20 each leg   SLS 3  x 30 sec Left LE  Tandem on foam 4 x 30" alternating   Squats: maintain hip flex precautions x 20           Patient Education and Home Exercises       Education provided:   -update of exercises to " work on balance   - will start chip/putting now with MD release     Written Home Exercises Provided: Patient instructed to cont prior HEP. Exercises were reviewed and Rod was able to demonstrate them prior to the end of the session.  Rod demonstrated good  understanding of the education provided. See EMR under Patient Instructions for exercises provided during therapy sessions    Assessment   He tolerated today's session well. All exercises managed well without increase in symptoms.  Feels ready to return to gym for exercise; feels ready to be discharged from PT. He has returned to Independent function; strength in LE's is grossly 4-5/5.     Rod Is progressing well towards his goals.   Pt prognosis is Good.       Pt's spiritual, cultural and educational needs considered and pt agreeable to plan of care and goals.     Anticipated barriers to physical therapy: None     Goals:   Short Term Goals: 3 weeks   Reduction in pain levels to no more than 1/10 with daily activities for improved ADL performance.  > MET   Ambulating with cane at household/community distances  > MET   Able to perform sit <>stand with good technique, no pain or compensation  > MET   Compliant with HEP > MET      Long Term Goals: 6 weeks   Reduction in pain to 0/10 with return to usual daily activities. > MET    Able to demonstrate functional AROM within restrictions in left hip without increased pain or compensation> MET   Ambulating 3 blocks without use of AD - no pain or compensation. > MET   Able to perform sit <>stand 8 reps in 30 secs. without compensation.  > MET   Able to demonstrate 1/2 grade increase in LE strength for improved functional mobility and activity performance. > partially MET   FOTO score improved to 68%  > Good progress - improved from 40 > % 81max function   Independent with HEP for continued improvement in function. > MET        Plan     Discharge from PT     Rose Ballesteros, PT

## 2024-07-12 DIAGNOSIS — M70.61 TROCHANTERIC BURSITIS OF RIGHT HIP: Primary | ICD-10-CM

## 2024-07-22 ENCOUNTER — CLINICAL SUPPORT (OUTPATIENT)
Dept: REHABILITATION | Facility: HOSPITAL | Age: 79
End: 2024-07-22
Payer: MEDICARE

## 2024-07-22 DIAGNOSIS — Z74.09 IMPAIRED FUNCTIONAL MOBILITY AND ACTIVITY TOLERANCE: Primary | ICD-10-CM

## 2024-07-22 DIAGNOSIS — M70.61 TROCHANTERIC BURSITIS OF RIGHT HIP: ICD-10-CM

## 2024-07-22 PROBLEM — R26.89 IMPAIRED GAIT AND MOBILITY: Status: RESOLVED | Noted: 2023-01-03 | Resolved: 2024-07-22

## 2024-07-22 PROCEDURE — 97140 MANUAL THERAPY 1/> REGIONS: CPT | Mod: PN

## 2024-07-22 PROCEDURE — 97161 PT EVAL LOW COMPLEX 20 MIN: CPT | Mod: PN

## 2024-07-22 PROCEDURE — 97014 ELECTRIC STIMULATION THERAPY: CPT | Mod: PN

## 2024-07-22 NOTE — PLAN OF CARE
[facility]   Physical Therapy Initial Evaluation      Name: Rod De La Paz  Clinic Number: [id number]    Therapy Diagnosis:   Encounter Diagnoses   Name Primary?    Trochanteric bursitis of right hip     Impaired functional mobility and activity tolerance Yes        Physician: Luther Fischer MD    Physician Orders: PT Eval and Treat   Medical Diagnosis from Referral:   M70.61 (ICD-10-CM) - Trochanteric bursitis of right hip   Evaluation Date: 7/22/2024  Authorization Period Expiration: 12/31/2024  Plan of Care Expiration: 10/22/2024  Progress Note Due: 8/30/2024  Date of Surgery: n/a   Visit # / Visits authorized: 1/ 1   FOTO: 1/ 3    Precautions: Standard     Time In: 10:30 am   Time Out: 11:40 am   Total Billable Time: 70 minutes    Subjective     Date of onset: about a month ago - tripped over large limb in yard -fell backward onto right side     History of current condition - Rod reports: He has been doing really well since his left VLADIMIR last year.  Playing golf 4+ days per week, walking the dog, etc  It was while walking the dog that his injury occurred - let the dog run in the backyard, didn't see a large limb on the ground, tripped over this and hit the tree and fell backwards onto his right side - in the grass.  Reports that his hip was sore, became more painful over the next few days with walking; He had a trip planned to [place] with his kids - was unable to get an appointment with Dr. Fischer prior to leaving.  On the trip, had to do quite a bit of walking and hips became more painful.  Upon return, got into see ortho.  He received a steroid injection and was started on Meloxicam.  Hip was xrayed - no disruption of VLADIMIR on this side noted, ortho wrote referral for therapy.  Rod stated that his hip is most painful first thing in the morning, but gets better the more he moves around;  He does note more pain with walking on uneven surfaces, going up/down steps and getting on/off  toilet as well as with toilet hygiene - reaching around his right side.   Rod reported that all is well with his prostate CA - no return, PSA tests have all been great.     Falls: other than recent episode, no falls     Imaging: nothing on file, patient stated that they xrayed his hip at [place] - no issues noted.     Prior Therapy: Yes for other diagnosis - Rod was here last year for Left VLADIMIR; Also for Right VLADIMIR before this   Social History: lives with wife, single story home;  has a pool and has been trying to exercise in this   Occupation: retired   Prior Level of Function: Independent, active   Current Level of Function: Independent, less activity as result of fall - hasn't returned to golf, still walking the dog     Pain:  Current 1/ 10, worst 6/10, best 0/10   Location: right  hip   Description: Aching  Aggravating Factors: first thing in AM pain is the worse.  Up/down steps, walking on uneven surfaces   Easing Factors: steroid injection and meloxicam is helping     Patients goals: To be out of pain right hip and return to his PLOF      Medical History:   No past medical history on file.    Surgical History:   Rod De La Paz  has no past surgical history on file.    Medications:   Rod currently has no medications in their medication list.    Allergies:   Review of patient's allergies indicates:  Not on File     Objective      Observation: Rod ambulated into clinic; slight antalgic gait pattern noted with decreased stance time on RLE, shortened step length left; Alert/oriented x 4; In no acute distress.     Posture: slight forward head, anterior pelvic tilt,     Hip Range of Motion:   Left active Left Passive Right active  Right Passive   Flexion 110   WNL   Abduction 35 WNL 35 WNL   Extension 10 WNL 8 WNL   Ext. Rotation 40 WNL 35 WFL   Int. Rotation 25 WNL 25 WFL          Lower Extremity Strength   Right LE Left LE   Knee extension: 5/5 5/5   Knee flexion: 5/5 5/5   Hip flexion: 5/5 5/5   Hip  "Internal Rotation:  4-/5    4/5      Hip External Rotation: 3+/5    4/5      Hip extension:  4-/5 4-/5   Hip abduction: 3+/5 4-/5   Hip adduction: 3+/5 3+/5   Ankle dorsiflexion: 5/5 5/5   Ankle plantarflexion: 5/5 5/5     Special Tests:   Left Right   FADIR Negative Negative   PAUL Negative    Mildly +       Scour Negative    Negative      Log Roll Negative    Negative      SLR Negative    Negative      Slump Negative Negative   [place] Negative    Negative        SLS (R)  Firm 10 sec,  Foam 10 sec with no UE assist - able to maintain for 30 sec with light UE assist   Toe Tap test: 6 reps on 6" step with unaffecting leg in 15 seconds - performed while standing on Air-Ex mat as well - able to maintain balance, but noted sligtht increase in his right hip pain due to unstable surface   30 sec chair rise: 8 reps with arms crossed  Steps: 2 feet up/down steps without railing; With railing can perform 1 foot per step, but with some discomfort and slower pace.     Palpation: moderate tenderness to palpation at right posterior hip - and superior hip - femoral head - gemellus, obturator, piriformis and gluteal medius,minimus. No pain for discomfort into right quad, ITB or hamstring, Does have some tenderness at TFL.     Sensation: intact to light touch RLE     Flexibility:     90/90 SLR = R minimal restriction, L minimal restriction   Ely's test: R no restriction, L no restriction   Mireya's test: R no restriction, L no restriction   Raji test: R minimal restriction, L minimal restriction    PT Evaluation Completed? Yes  Discussed Plan of Care with patient: Yes    Intake Outcome Measure for FOTO Hip  Survey    Therapist reviewed FOTO scores for Rod De La Paz on 7/22/2024.   FOTO report - see Media section or FOTO account episode details.     Intake Score: 49%              Treatment     Total Treatment time (time-based codes) separate from Evaluation: 25 minutes     Rod received the treatments listed below:  "     Discussed trial of Dry Needling with patient - he is familiar with this - had it for his back several years ago.  Patient was agreeable to performing this on his hip today.  Reviewed all indications/contraindications with patient; Had him sign consent for Dry Needling - scanned into MEDIA section of chart. Reviewed what to expect after the dry needling with patient.     manual therapy techniques: Soft tissue Mobilization, functional dry needling  were applied to the: Right hip/gluteals for 25 minutes, including:  Patient positioned in S/L on the left - right hip up; STM to piriformis, gluteal med/minimus and rotators at attachment to posterior hip; LF-ES per protocol for right hip posterior hip/gluteals utilizing 75 and 60 mm needles.  Needles inserted per protocol with clockwise winding for increased tissue grasp.  Stimulation adjusted to patient tolerance, good rhythmical contraction of tissue noted.  Needles left in-situ x 16 mins.  Removed needles without adverse effects.   Review info again with patient;   Exercises: clams, s/l hip abduction, bridges, SLR, squats  - all of his hip exercises from previous tx.     Patient Education and Home Exercises     Education provided:   - Role of PT; POC to include dry needling, exercise, STM     Written Home Exercises Provided: yes. Exercises were reviewed and Rod was able to demonstrate them prior to the end of the session.  Rod demonstrated good  understanding of the education provided. See EMR under Patient Instructions for exercises provided during therapy sessions.    Assessment     Rod is a 79 y.o. male referred to outpatient Physical Therapy with a medical diagnosis of Right trochanteric bursitis. Patient presents with slightly antalgic gait pattern, impaired functional mobility and activity tolerance, mild weakness/pain in right hip mm.  Patient responded well to Dry Needling today, will further assess at next visit.  He is already noting improvement from  steroid injection and use of NSAIDS, should continue to progress with Skilled therapy interventon to assist in reduction of inflammation and tissue tightness/injury at posterior hip.     Patient prognosis is Good.   Patient will benefit from skilled outpatient Physical Therapy to address the deficits stated above and in the chart below, provide patient /family education, and to maximize patientt's level of independence.     Plan of care discussed with patient: Yes  Patient's spiritual, cultural and educational needs considered and patient is agreeable to the plan of care and goals as stated below:     Anticipated Barriers for therapy: none     Medical Necessity is demonstrated by the following  History  Co-morbidities and personal factors that may impact the plan of care [x] LOW: no personal factors / co-morbidities  [] MODERATE: 1-2 personal factors / co-morbidities  [] HIGH: 3+ personal factors / co-morbidities    Moderate / High Support Documentation:   Co-morbidities affecting plan of care: n/a    Personal Factors:   no deficits     Examination  Body Structures and Functions, activity limitations and participation restrictions that may impact the plan of care [x] LOW: addressing 1-2 elements  [] MODERATE: 3+ elements  [] HIGH: 4+ elements (please support below)    Moderate / High Support Documentation: n/a     Clinical Presentation [x] LOW: stable  [] MODERATE: Evolving  [] HIGH: Unstable     Decision Making/ Complexity Score: low       Goals:  Short Term Goals: 3 weeks   Demonstrate improvement in recent symptoms to progress toward long term goals   Correct standing postural deficits to reduce pain and promote postural awareness for injury prevention   Demonstrate compliance with initial exercise program    Long Term Goals: 6 weeks   Able to perform all household activities with no limitation   Able to perform all work related activities with no limitation   Perform hip AROM through all planes without symptoms  or limitation   Able to ambulate community required distances with no limitation.   Ascend/descend flight of steps with no limitation   Able to perform transfers from all surfaces with no limitation   Able to return to golf with no limitation   FOTO score improvement to > 74  Independent with HEP for continued improvement in function.     Plan     Plan of care Certification: 7/22/2024 to 10/22/2024.    Outpatient Physical Therapy 2 times weekly for 6 weeks to include the following interventions: Manual Therapy, Neuromuscular Re-ed, Patient Education, Therapeutic Activities, and Therapeutic Exercise.     Rose Ballesteros, PT        Physician's Signature: _________________________________________ Date: ________________

## 2024-07-24 ENCOUNTER — CLINICAL SUPPORT (OUTPATIENT)
Dept: REHABILITATION | Facility: HOSPITAL | Age: 79
End: 2024-07-24
Payer: MEDICARE

## 2024-07-24 DIAGNOSIS — Z74.09 IMPAIRED FUNCTIONAL MOBILITY AND ACTIVITY TOLERANCE: Primary | ICD-10-CM

## 2024-07-24 PROCEDURE — 97112 NEUROMUSCULAR REEDUCATION: CPT | Mod: PN

## 2024-07-24 PROCEDURE — 97140 MANUAL THERAPY 1/> REGIONS: CPT | Mod: PN

## 2024-07-24 PROCEDURE — 97110 THERAPEUTIC EXERCISES: CPT | Mod: PN

## 2024-07-24 NOTE — PROGRESS NOTES
[facility]   Physical Therapy Treatment Note      Name: Rod De La Paz  [facility] Number: [id number]    Therapy Diagnosis:   Encounter Diagnosis   Name Primary?    Impaired functional mobility and activity tolerance Yes     Physician: Luther Fischer MD    Visit Date: 7/24/2024    Physician Orders: PT Eval and Treat   Medical Diagnosis from Referral:   M70.61 (ICD-10-CM) - Trochanteric bursitis of right hip   Evaluation Date: 7/22/2024  Authorization Period Expiration: 12/31/2024  Plan of Care Expiration: 10/22/2024  Progress Note Due: 8/30/2024  Date of Surgery: n/a   Visit # / Visits authorized: 1/ 1   FOTO: 1/ 3     Precautions: Standard      Time In: 10:30 am   Time Out: 11:40 am   Total Billable Time: 70 minutesPhysician Orders: PT Eval and Treat   Medical Diagnosis from Referral:   M70.61 (ICD-10-CM) - Trochanteric bursitis of right hip   Evaluation Date: 7/22/2024  Authorization Period Expiration: 12/31/2024  Plan of Care Expiration: 10/22/2024  Progress Note Due: 8/30/2024  Date of Surgery: n/a   Visit # / Visits authorized: 1/ 12  FOTO: 1/ 3     Precautions: Standard      Time In: 8:45 am   Time Out: 9:30 am   Total Billable Time: 45 minutes    PTA Visit #: 0/5       Subjective     Patient reports: he is about the same;   He was compliant with home exercise program.  Response to previous treatment: eval  - felt less pain after dry needling for the next 24 hours   Functional change: still with pain in right hip at times.     Pain: 2/10  Location: right hip      Objective      Objective Measures updated at progress report unless specified.     Treatment     Rod received the treatments listed below:      therapeutic exercises to develop strength, endurance, and ROM for 15 minutes including:  Recumbent bike : level 8 x 12 mins   Achilles stretch on wedge: 30 sec x 2  Single leg heel raises (R) x 15     manual therapy techniques: Soft tissue Mobilization were applied to the: right hip for  "10 minutes, including:  Right posterior hip STM/friction massage across posterior hip tendon atttachments to release tissue adhesions, ITB STM from iliac crest to knee; Passive stretching to right hip - rotation/IR/ER and hamstring   Prone: piriformis STM and release on right;     neuromuscular re-education activities to improve: Balance, Coordination, and Posture for 20 minutes. The following activities were included:  Toe-Taps while standing on Air-Ex mat - 8" step x 2 mins   SLS on Air-Ex on Right - 30 sec x 2  Tandem Stance on Air-Ex: 30 sec x 2   Step-ups: 6" step - lead with right - forward and lateral x 20 reps each   S/L on mat: hip/knee flexion forward with resistance at foot into hip extension x 10   Hooklying position: alternate hip flexion - knee toward shoulder x 10   Bridging x 15       therapeutic activities to improve functional performance for   minutes, including:      gait training to improve functional mobility and safety for   minutes, including:    Patient Education and Home Exercises       Education provided:   - continue with exercise, walking at home; stretching hip     Written Home Exercises Provided: Patient instructed to cont prior HEP. Exercises were reviewed and Rod was able to demonstrate them prior to the end of the session.  Rod demonstrated good  understanding of the education provided. See Electronic Medical Record under Patient Instructions for exercises provided during therapy sessions    Assessment     Rod is about the same after evaluation, but did well with all activities today without exacerbation of right hip pain; Noted some increased discomfort with standing ex on Air-Ex, but able to perform activities without LOB.     Rod Is progressing well towards his goals.   Patient prognosis is Good.     Patient will continue to benefit from skilled outpatient physical therapy to address the deficits listed in the problem list box on initial evaluation, provide pt/family " education and to maximize pt's level of independence in the home and community environment.     Patient's spiritual, cultural and educational needs considered and pt agreeable to plan of care and goals.     Anticipated barriers to physical therapy: none     Goals:   Short Term Goals: 3 weeks   Demonstrate improvement in recent symptoms to progress toward long term goals   Correct standing postural deficits to reduce pain and promote postural awareness for injury prevention   Demonstrate compliance with initial exercise program     Long Term Goals: 6 weeks   Able to perform all household activities with no limitation   Able to perform all work related activities with no limitation   Perform hip AROM through all planes without symptoms or limitation   Able to ambulate community required distances with no limitation.   Ascend/descend flight of steps with no limitation   Able to perform transfers from all surfaces with no limitation   Able to return to golf with no limitation   FOTO score improvement to > 74  Independent with HEP for continued improvement in function.     Plan     Plan of care Certification: 7/22/2024 to 10/22/2024.     Outpatient Physical Therapy 2 times weekly for 6 weeks to include the following interventions: Manual Therapy, Neuromuscular Re-ed, Patient Education, Therapeutic Activities, and Therapeutic Exercise.        Rose Ballesteros, PT

## 2024-07-29 ENCOUNTER — CLINICAL SUPPORT (OUTPATIENT)
Dept: REHABILITATION | Facility: HOSPITAL | Age: 79
End: 2024-07-29
Payer: MEDICARE

## 2024-07-29 DIAGNOSIS — Z74.09 IMPAIRED FUNCTIONAL MOBILITY AND ACTIVITY TOLERANCE: Primary | ICD-10-CM

## 2024-07-29 PROCEDURE — 97530 THERAPEUTIC ACTIVITIES: CPT | Mod: PN

## 2024-07-29 PROCEDURE — 97112 NEUROMUSCULAR REEDUCATION: CPT | Mod: PN

## 2024-07-29 PROCEDURE — 97110 THERAPEUTIC EXERCISES: CPT | Mod: PN

## 2024-07-29 NOTE — PROGRESS NOTES
[facility]   Physical Therapy Treatment Note      Name: Rod De La Paz  Clinic Number: [id number]    Therapy Diagnosis:   Encounter Diagnosis   Name Primary?    Impaired functional mobility and activity tolerance Yes     Physician: Luther Fischer MD    Visit Date: 7/29/2024    Physician Orders: PT Eval and Treat   Medical Diagnosis from Referral:   M70.61 (ICD-10-CM) - Trochanteric bursitis of right hip   Evaluation Date: 7/22/2024  Authorization Period Expiration: 12/31/2024  Plan of Care Expiration: 10/22/2024  Progress Note Due: 8/30/2024  Date of Surgery: n/a   Visit # / Visits authorized: 2/12    FOTO: 1/ 3     Precautions: Standard         Time In: 3:00 pm  Time Out:  3:45 pm    Total Billable Time: 45 minutes    PTA Visit #: 0/5       Subjective     Patient reports: I am the best I've been since I fell.  I woke up this morning without any pain in my hip.   He was compliant with home exercise program.  Response to previous treatment: good,    Functional change:  moving better with less pain     Pain: 0-2/10  Location: right hip      Objective      Objective Measures updated at progress report unless specified.     Treatment     Rod received the treatments listed below:      therapeutic exercises to develop strength, endurance, and ROM for 15 minutes including:  Recumbent bike : level 8 x 12 mins   Achilles stretch on wedge: 30 sec x 2  Single leg heel raises (R) x 15     manual therapy techniques: Soft tissue Mobilization were applied to the: right hip for 5 minutes, including:  Right posterior hip STM/friction massage across posterior hip tendon atttachments to release tissue adhesions, ITB STM from iliac crest to knee; Passive stretching to right hip - rotation/IR/ER and hamstring   Prone: piriformis STM and release on right;     neuromuscular re-education activities to improve: Balance, Coordination, and Posture for 10 minutes. The following activities were included:  Toe-Taps  "while standing on Air-Ex mat - 8" step x 2 mins   SLS on Air-Ex on Right - 30 sec x 2  Tandem Stance on Air-Ex: 30 sec x 2   SLS (R) firm surface: move left leg into flex/abd/ext/rotation to challenge balance on right 30 sec x 3        therapeutic activities to improve functional performance for 15  minutes, including:  Step-ups: 6" step - lead with right - forward and lateral x 20 reps each   Squats x 15   Clams with blue band resistance x 15   S/L hip abduction  - blue band x 15  Bridges - blud band around thigh x 15  Supine - legs straight - blue band twisted around feet - alternate hip/knee flexion x 10       gait training to improve functional mobility and safety for   minutes, including:    Patient Education and Home Exercises       Education provided:   - continue with exercise, walking at home; stretching hip     Written Home Exercises Provided: Patient instructed to cont prior HEP. Exercises were reviewed and Rod was able to demonstrate them prior to the end of the session.  Rod demonstrated good  understanding of the education provided. See Electronic Medical Record under Patient Instructions for exercises provided during therapy sessions    Assessment     Rod is noting improvement in right hip pain - with no pain upon getting up this morning - first time since injury.  Strength/balance and gait improved.     Rod Is progressing well towards his goals.   Patient prognosis is Good.     Patient will continue to benefit from skilled outpatient physical therapy to address the deficits listed in the problem list box on initial evaluation, provide pt/family education and to maximize pt's level of independence in the home and community environment.     Patient's spiritual, cultural and educational needs considered and pt agreeable to plan of care and goals.     Anticipated barriers to physical therapy: none     Goals:   Short Term Goals: 3 weeks   Demonstrate improvement in recent symptoms to progress " toward long term goals   Correct standing postural deficits to reduce pain and promote postural awareness for injury prevention   Demonstrate compliance with initial exercise program     Long Term Goals: 6 weeks   Able to perform all household activities with no limitation   Able to perform all work related activities with no limitation   Perform hip AROM through all planes without symptoms or limitation   Able to ambulate community required distances with no limitation.   Ascend/descend flight of steps with no limitation   Able to perform transfers from all surfaces with no limitation   Able to return to golf with no limitation   FOTO score improvement to > 74  Independent with HEP for continued improvement in function.     Plan     Plan of care Certification: 7/22/2024 to 10/22/2024.     Outpatient Physical Therapy 2 times weekly for 6 weeks to include the following interventions: Manual Therapy, Neuromuscular Re-ed, Patient Education, Therapeutic Activities, and Therapeutic Exercise.        Rose Ballesteros, PT

## 2024-07-31 ENCOUNTER — CLINICAL SUPPORT (OUTPATIENT)
Dept: REHABILITATION | Facility: HOSPITAL | Age: 79
End: 2024-07-31
Payer: MEDICARE

## 2024-07-31 DIAGNOSIS — Z74.09 IMPAIRED FUNCTIONAL MOBILITY AND ACTIVITY TOLERANCE: Primary | ICD-10-CM

## 2024-07-31 PROCEDURE — 97110 THERAPEUTIC EXERCISES: CPT | Mod: PN,CQ

## 2024-07-31 PROCEDURE — 97530 THERAPEUTIC ACTIVITIES: CPT | Mod: PN,CQ

## 2024-07-31 PROCEDURE — 97112 NEUROMUSCULAR REEDUCATION: CPT | Mod: PN,CQ

## 2024-07-31 NOTE — PROGRESS NOTES
"[facility]   Physical Therapy Treatment Note      Name: Rod De La Paz  Clinic Number: [id number]    Therapy Diagnosis:   Encounter Diagnosis   Name Primary?    Impaired functional mobility and activity tolerance Yes     Physician: Luther Fischer MD    Visit Date: 7/31/2024    Physician Orders: PT Eval and Treat   Medical Diagnosis from Referral:   M70.61 (ICD-10-CM) - Trochanteric bursitis of right hip   Evaluation Date: 7/22/2024  Authorization Period Expiration: 12/31/2024  Plan of Care Expiration: 10/22/2024  Progress Note Due: 8/30/2024  Date of Surgery: n/a   Visit # / Visits authorized: 3/12    FOTO: 1/ 3     Precautions: Standard         Time In: 8:45 AM  Time Out:  9:30 AM   Total Billable Time: 40 minutes    PTA Visit #: 1/5       Subjective     Patient reports: Feeling good.   He was compliant with home exercise program.  Response to previous treatment: good,    Functional change:  moving better with less pain     Pain: 0/10  Location: right hip      Objective      Objective Measures updated at progress report unless specified.     Treatment     Rod received the treatments listed below:      therapeutic exercises to develop strength, endurance, and ROM for 15 minutes including:  Recumbent bike : level 8 x 15 mins   Achilles stretch on wedge: 30 sec x 2  Single leg heel raises (R) x 15     manual therapy techniques: Soft tissue Mobilization were applied to the: right hip for  minutes, including:  Right posterior hip STM/friction massage across posterior hip tendon atttachments to release tissue adhesions, ITB STM from iliac crest to knee; Passive stretching to right hip - rotation/IR/ER and hamstring   Prone: piriformis STM and release on right;     neuromuscular re-education activities to improve: Balance, Coordination, and Posture for 10 minutes. The following activities were included:  Toe-Taps while standing on Air-Ex mat - 8" step x 2 mins   SLS on Air-Ex on Right - 30 sec " "x 2  Tandem Stance on Air-Ex: 30 sec x 2   SLS (R) firm surface: move left leg into flex/abd/ext/rotation to challenge balance on right 30 sec x 3      therapeutic activities to improve functional performance for 15  minutes, including:  Step-ups: 6" step - lead with right - forward and lateral x 20 reps each   Squats x 15   Clams with blue band resistance x 15   S/L hip abduction  - blue band x 15  Bridges - blue band around thigh x 15  Supine - legs straight - blue band twisted around feet - alternate hip/knee flexion x 10       gait training to improve functional mobility and safety for   minutes, including:    Patient Education and Home Exercises       Education provided:   - continue with exercise, walking at home; stretching hip     Written Home Exercises Provided: Patient instructed to cont prior HEP. Exercises were reviewed and Rod was able to demonstrate them prior to the end of the session.  Rod demonstrated good  understanding of the education provided. See Electronic Medical Record under Patient Instructions for exercises provided during therapy sessions    Assessment     Rod is noting improvement in right hip pain - with no pain upon getting up this morning. Strength/balance and gait improved.     Rod Is progressing well towards his goals.   Patient prognosis is Good.     Patient will continue to benefit from skilled outpatient physical therapy to address the deficits listed in the problem list box on initial evaluation, provide pt/family education and to maximize pt's level of independence in the home and community environment.     Patient's spiritual, cultural and educational needs considered and pt agreeable to plan of care and goals.     Anticipated barriers to physical therapy: none     Goals:   Short Term Goals: 3 weeks   Demonstrate improvement in recent symptoms to progress toward long term goals   Correct standing postural deficits to reduce pain and promote postural awareness for " injury prevention   Demonstrate compliance with initial exercise program     Long Term Goals: 6 weeks   Able to perform all household activities with no limitation   Able to perform all work related activities with no limitation   Perform hip AROM through all planes without symptoms or limitation   Able to ambulate community required distances with no limitation.   Ascend/descend flight of steps with no limitation   Able to perform transfers from all surfaces with no limitation   Able to return to golf with no limitation   FOTO score improvement to > 74  Independent with HEP for continued improvement in function.     Plan     Plan of care Certification: 7/22/2024 to 10/22/2024.     Outpatient Physical Therapy 2 times weekly for 6 weeks to include the following interventions: Manual Therapy, Neuromuscular Re-ed, Patient Education, Therapeutic Activities, and Therapeutic Exercise.        Jonathan Favre, PTA

## 2024-08-08 ENCOUNTER — CLINICAL SUPPORT (OUTPATIENT)
Dept: REHABILITATION | Facility: HOSPITAL | Age: 79
End: 2024-08-08
Payer: MEDICARE

## 2024-08-08 DIAGNOSIS — Z74.09 IMPAIRED FUNCTIONAL MOBILITY AND ACTIVITY TOLERANCE: Primary | ICD-10-CM

## 2024-08-08 PROCEDURE — 97112 NEUROMUSCULAR REEDUCATION: CPT | Mod: PN

## 2024-08-08 PROCEDURE — 97140 MANUAL THERAPY 1/> REGIONS: CPT | Mod: PN

## 2024-08-12 ENCOUNTER — CLINICAL SUPPORT (OUTPATIENT)
Dept: REHABILITATION | Facility: HOSPITAL | Age: 79
End: 2024-08-12
Payer: MEDICARE

## 2024-08-12 DIAGNOSIS — Z74.09 IMPAIRED FUNCTIONAL MOBILITY AND ACTIVITY TOLERANCE: Primary | ICD-10-CM

## 2024-08-12 PROCEDURE — 97110 THERAPEUTIC EXERCISES: CPT | Mod: KX,PN

## 2024-08-12 PROCEDURE — 97112 NEUROMUSCULAR REEDUCATION: CPT | Mod: KX,PN

## 2024-08-12 PROCEDURE — 97140 MANUAL THERAPY 1/> REGIONS: CPT | Mod: KX,PN

## 2024-08-12 NOTE — PROGRESS NOTES
[facility]   Physical Therapy Treatment Note      Name: Rod De La Paz  Clinic Number: [id number]    Therapy Diagnosis:   Encounter Diagnosis   Name Primary?    Impaired functional mobility and activity tolerance Yes     Physician: Luther Fischer MD    Visit Date: 8/12/2024    Physician Orders: PT Eval and Treat   Medical Diagnosis from Referral:   M70.61 (ICD-10-CM) - Trochanteric bursitis of right hip   Evaluation Date: 7/22/2024  Authorization Period Expiration: 12/31/2024  Plan of Care Expiration: 10/22/2024  Progress Note Due: 8/30/2024  Date of Surgery: n/a   Visit # / Visits authorized: 5 /12    FOTO: 1/ 3     Precautions: Standard         Time In: 10: 30  AM  Time Out: 11:15  AM   Total Billable Time: 45 minutes    PTA Visit #: 0/5       Subjective     Patient reports: Doing slightly better; Borrowed a friends Upright Walker - much easier for him to get around with improved posture in standing; Going to see Pain management MD tomorrow.   He was compliant with home exercise program.  Response to previous treatment: good - noting less pain in RLE today   Functional change:  was able to stand from bed this morning and take several steps to bathroom - pain, but tolerable; Using Upright walker for distance mobility - safer, better posture; reduced pain in RLE.     Pain: 5-6 /10  Location: right hip      Objective      Objective Measures updated at progress report unless specified.     Treatment     Rod received the treatments listed below:      therapeutic exercises to develop strength, endurance, and ROM for 15 minutes including:  Recumbent bike : level 5 x 15 mins   Achilles stretch on wedge: 30 sec x 2  Single leg heel raises (R) x 15       manual therapy techniques: Soft tissue Mobilization were applied to the: right hip for  20  minutes, including:   PROM right hip through full ROM without increase in pain; S/L on left:  segmental mobilization to right side of lumbar spine - all  "segments with blocking of segment above for max facet movement; paraspinal skin rolling, long axis distraction of paraspinal/erector spine mm with increased hold time for tissue release; Performed same on Left side; Prone: lamina release lumbar spine; piriformis STM with trigger point release; friction massage of posterior hip tendon/ligamentous attachments to reduce inflammation.       neuromuscular re-education activities to improve: Balance, Coordination, and Posture for 12 minutes. The following activities were included:  Posterior pelvic tilt x 10  H/S stretch x 30 sec x 2  Piriformis stretch (r) 30 sec x 2  Prone - lumbar extension x 6  Quadruped: cat/camel x 6   Toe-Taps while standing on Air-Ex mat - 8" step x 2 mins   SLS on Air-Ex on Right - 30 sec x 2  Tandem Stance on Air-Ex: 30 sec x 2   SLS (R) firm surface: move left leg into flex/abd/ext/rotation to challenge balance on right 30 sec x 3      therapeutic activities to improve functional performance for 0  minutes, including:  Step-ups: 6" step - lead with right - forward and lateral x 20 reps each   Squats x 15   Clams with blue band resistance x 15   S/L hip abduction  - blue band x 15  Bridges - blue band around thigh x 15  Supine - legs straight - blue band twisted around feet - alternate hip/knee flexion x 10       gait training to improve functional mobility and safety for   minutes, including:    Patient Education and Home Exercises       Education provided:   - continue with exercise, walking at home; stretching hip     Written Home Exercises Provided: Patient instructed to cont prior HEP. Exercises were reviewed and Rod was able to demonstrate them prior to the end of the session.  Rod demonstrated good  understanding of the education provided. See Electronic Medical Record under Patient Instructions for exercises provided during therapy sessions    Assessment     Rod is moving better today; pain is more concentrated in posterior right " hip - area of tenderness with palpation - which is same area that was involved on evaluation; still getting radicular symptoms into anterior thigh, but less. Was able to put more pressure through RLE this morning, able to make transitional movements/ transfers with less pain. Going to pain management tomorrow.     Rod Is progressing well towards his goals.   Patient prognosis is Good.     Patient will continue to benefit from skilled outpatient physical therapy to address the deficits listed in the problem list box on initial evaluation, provide pt/family education and to maximize pt's level of independence in the home and community environment.     Patient's spiritual, cultural and educational needs considered and pt agreeable to plan of care and goals.     Anticipated barriers to physical therapy: none     Goals:   Short Term Goals: 3 weeks   Demonstrate improvement in recent symptoms to progress toward long term goals  > Progressing   Correct standing postural deficits to reduce pain and promote postural awareness for injury prevention   Demonstrate compliance with initial exercise program     Long Term Goals: 6 weeks   Able to perform all household activities with no limitation   Able to perform all work related activities with no limitation   Perform hip AROM through all planes without symptoms or limitation   Able to ambulate community required distances with no limitation.   Ascend/descend flight of steps with no limitation   Able to perform transfers from all surfaces with no limitation   Able to return to golf with no limitation   FOTO score improvement to > 74  Independent with HEP for continued improvement in function.     Plan     Plan of care Certification: 7/22/2024 to 10/22/2024.     Outpatient Physical Therapy 2 times weekly for 6 weeks to include the following interventions: Manual Therapy, Neuromuscular Re-ed, Patient Education, Therapeutic Activities, and Therapeutic Exercise.        Rose Ballesteros,  PT

## 2024-09-04 ENCOUNTER — CLINICAL SUPPORT (OUTPATIENT)
Dept: REHABILITATION | Facility: HOSPITAL | Age: 79
End: 2024-09-04
Payer: MEDICARE

## 2024-09-04 DIAGNOSIS — Z74.09 IMPAIRED FUNCTIONAL MOBILITY AND ACTIVITY TOLERANCE: Primary | ICD-10-CM

## 2024-09-04 PROCEDURE — 97140 MANUAL THERAPY 1/> REGIONS: CPT | Mod: KX,PN

## 2024-09-04 PROCEDURE — 97112 NEUROMUSCULAR REEDUCATION: CPT | Mod: KX,PN

## 2024-09-04 PROCEDURE — 97530 THERAPEUTIC ACTIVITIES: CPT | Mod: KX,PN

## 2024-09-04 NOTE — PROGRESS NOTES
[facility]   Physical Therapy Treatment Note      Name: Rod De La Paz  Clinic Number: [id number]    Therapy Diagnosis:   Encounter Diagnosis   Name Primary?    Impaired functional mobility and activity tolerance Yes     Physician: Luther Fischer MD    Visit Date: 9/4/2024    Physician Orders: PT Eval and Treat   Medical Diagnosis from Referral:   M70.61 (ICD-10-CM) - Trochanteric bursitis of right hip   Evaluation Date: 7/22/2024  Authorization Period Expiration: 12/31/2024  Plan of Care Expiration: 10/22/2024  Progress Note Due: 10/6/2024  Date of Surgery: n/a   Visit # / Visits authorized: 6 /12    FOTO: 1/ 3     Precautions: Standard         Time In: 8:45  AM  Time Out: 9:30   AM   Total Billable Time: 45 minutes    PTA Visit #: 0/5       Subjective     Patient reports:  Had RAYMOND 3 weeks ago with good resolution of his right lumbar and RLE pain; He reports that he has reduced his activity for past 2 weeks as directed and now feels like he is ready to start getting back into regular activity.  Reports pain is just in right posterior hip   He was compliant with home exercise program.  Response to previous treatment:  about 4 week hold secondary to Right sciatica pain - able to get RAYMOND 3 weeks ago with excellent results.   Functional change:  walking without increased symptoms in right hip; able to put weight through leg without pain; hasn't tried to play golf yet;     Pain: 2-3 /10  Location: right hip      Objective      Objective Measures updated at progress report unless specified.     Treatment     Rod received the treatments listed below:      therapeutic exercises to develop strength, endurance, and ROM for 0 minutes including:  Recumbent bike : level 5 x 15 mins   Achilles stretch on wedge: 30 sec x 2  Single leg heel raises (R) x 15       manual therapy techniques: Soft tissue Mobilization were applied to the: right hip for  10  minutes, including:   PROM right hip through full  "ROM without increase in pain; palpation of right hip -  posterior/top of femoral head is area of greatest discomfort - this has returned to more of his original pain before his trip to [place].  Performed friction massage of tissue around femoral head followed by ROM through full movement in all planes.       neuromuscular re-education activities to improve: Balance, Coordination, and Posture for 15 minutes. The following activities were included:  Posterior pelvic tilt x 10  H/S stretch x 30 sec x 2  Piriformis stretch (r) 30 sec x 2  Prone - lumbar extension x 6  Quadruped: cat/camel x 6   Toe-Taps while standing on Air-Ex mat - 8" step x 2 mins   SLS on Air-Ex on Right - 30 sec x 2  Tandem Stance on Air-Ex: 30 sec x 2   SLS (R) firm surface: move left leg into flex/abd/ext/rotation to challenge balance on right 30 sec x 3      therapeutic activities to improve functional performance for 20  minutes, including:  Step-ups: 6" step - lead with right - forward and lateral x 20 reps each   3-way hip - green tband x 15 each   Squats x 15   Clams with blue band resistance x 15   S/L hip abduction  - blue band x 15  Bridges - blue band around thigh x 15  Supine - legs straight - blue band twisted around feet - alternate hip/knee flexion x 10       gait training to improve functional mobility and safety for   minutes, including:    Patient Education and Home Exercises       Education provided:   - continue with exercise, walking at home; stretching hip     Written Home Exercises Provided: Patient instructed to cont prior HEP. Exercises were reviewed and Rod was able to demonstrate them prior to the end of the session.  Rod demonstrated good  understanding of the education provided. See Electronic Medical Record under Patient Instructions for exercises provided during therapy sessions    Assessment     Rod has been away from PT since 8/12/24 - was able to see pain management MD and set-up RAYMOND for lumbar spine.  He is " now able to ambulate without use of any AD; He appears back to his usual state of health before his trip to [place].  He has mild discomfort superior/posterior femoral head; able to perform all exercise/activities above without increased symptoms.  Will benefit from continued Skilled PT for a couple of weeks to address lingering strength/mobility deficits in order to return patient to his PLOF.     Rod Is progressing well towards his goals.   Patient prognosis is Good.     Patient will continue to benefit from skilled outpatient physical therapy to address the deficits listed in the problem list box on initial evaluation, provide pt/family education and to maximize pt's level of independence in the home and community environment.     Patient's spiritual, cultural and educational needs considered and pt agreeable to plan of care and goals.     Anticipated barriers to physical therapy: none     Goals:   Short Term Goals: 3 weeks   Demonstrate improvement in recent symptoms to progress toward long term goals  > Progressing   Correct standing postural deficits to reduce pain and promote postural awareness for injury prevention > progressing   Demonstrate compliance with initial exercise program > Progressing      Long Term Goals: 6 weeks   Able to perform all household activities with no limitation   Able to perform all work related activities with no limitation   Perform hip AROM through all planes without symptoms or limitation  > Progressing   Able to ambulate community required distances with no limitation.   Ascend/descend flight of steps with no limitation   Able to perform transfers from all surfaces with no limitation  > MET   Able to return to golf with no limitation   FOTO score improvement to > 74  Independent with HEP for continued improvement in function.     Plan     Plan of care Certification: 7/22/2024 to 10/22/2024.     Outpatient Physical Therapy 2 times weekly for 6 weeks to include the following  interventions: Manual Therapy, Neuromuscular Re-ed, Patient Education, Therapeutic Activities, and Therapeutic Exercise.        Rose Ballesteros, PT

## 2024-09-11 ENCOUNTER — CLINICAL SUPPORT (OUTPATIENT)
Dept: REHABILITATION | Facility: HOSPITAL | Age: 79
End: 2024-09-11
Payer: MEDICARE

## 2024-09-11 DIAGNOSIS — Z74.09 IMPAIRED FUNCTIONAL MOBILITY AND ACTIVITY TOLERANCE: Primary | ICD-10-CM

## 2024-09-11 PROCEDURE — 97140 MANUAL THERAPY 1/> REGIONS: CPT | Mod: PN

## 2024-09-11 PROCEDURE — 97110 THERAPEUTIC EXERCISES: CPT | Mod: PN

## 2024-09-11 PROCEDURE — 97530 THERAPEUTIC ACTIVITIES: CPT | Mod: PN

## 2024-09-11 PROCEDURE — 97112 NEUROMUSCULAR REEDUCATION: CPT | Mod: PN

## 2024-09-11 NOTE — PROGRESS NOTES
[facility]   Physical Therapy Treatment Note      Name: Rod De La Paz  Clinic Number: [id number]    Therapy Diagnosis:   Encounter Diagnosis   Name Primary?    Impaired functional mobility and activity tolerance Yes     Physician: Luther Fischer MD    Visit Date: 9/11/2024    Physician Orders: PT Eval and Treat   Medical Diagnosis from Referral:   M70.61 (ICD-10-CM) - Trochanteric bursitis of right hip   Evaluation Date: 7/22/2024  Authorization Period Expiration: 12/31/2024  Plan of Care Expiration: 10/22/2024  Progress Note Due: 10/6/2024  Date of Surgery: n/a   Visit # / Visits authorized: 7 /12    FOTO: 1/ 3     Precautions: Standard         Time In: 7:15  AM  Time Out: 8:15  AM   Total Billable Time: 55 minutes    PTA Visit #: 0/5       Subjective     Patient reports:  played 9 holes of golf Sunday, did OK, just fatigued; Hip still with slight discomfort superior/posterior, also some mild occasional lower back discomfort.   He was compliant with home exercise program.  Response to previous treatment:  about 4 week hold secondary to Right sciatica pain - able to get RAYMOND 3 weeks ago with excellent results.   Functional change:  walking without increased symptoms in right hip; able to put weight through leg without pain;     Pain: 2-3 /10  Location: right hip      Objective      Objective Measures updated at progress report unless specified.     Treatment     Rod received the treatments listed below:      therapeutic exercises to develop strength, endurance, and ROM for 15 minutes including:  Recumbent bike : level 5 x 15 mins   Achilles stretch on wedge: 30 sec x 2  Single leg heel raises (R) x 15       manual therapy techniques: Soft tissue Mobilization were applied to the: right hip for  10  minutes, including:   IASTM to right lumbar spine, right posterior/superior hip;  PROM right hip through all planes with end range stretching.       neuromuscular re-education activities to  "improve: Balance, Coordination, and Posture for 15 minutes. The following activities were included:  Posterior pelvic tilt x 10  H/S stretch x 30 sec x 2  Piriformis stretch (r) 30 sec x 2  Prone - lumbar extension x 6  Quadruped: cat/camel x 6   Toe-Taps while standing on Air-Ex mat - 8" step x 2 mins   SLS on Air-Ex on Right - 30 sec x 2  Tandem Stance on Air-Ex: 30 sec x 2   SLS (R) firm surface: move left leg into flex/abd/ext/rotation to challenge balance on right 30 sec x 3      therapeutic activities to improve functional performance for 15  minutes, including:  Step-ups: 8" step - lead with right - forward and lateral x 20 reps each  - on/off Air-Ex   3-way hip - green tband x 15 each   Squats x 15   Clams with blue band resistance x 15   S/L hip abduction  - blue band x 15  Bridges - blue band around thigh x 15  Supine - legs straight - blue band twisted around feet - alternate hip/knee flexion x 10       gait training to improve functional mobility and safety for   minutes, including:    Patient Education and Home Exercises       Education provided:   - continue with exercise, walking at home; stretching hip     Written Home Exercises Provided: Patient instructed to cont prior HEP. Exercises were reviewed and Rod was able to demonstrate them prior to the end of the session.  Rod demonstrated good  understanding of the education provided. See Electronic Medical Record under Patient Instructions for exercises provided during therapy sessions    Assessment     Rod is progressing well with improved mobility and daily function.  He feels like he is just about back to where he was prior to leaving for [place]. Strength improving as well  Mild right hip discomfort noted with higher impact activities.   Will benefit from continued Skilled PT for a couple of weeks to address lingering strength/mobility deficits in order to return patient to his PLOF.     Rod Is progressing well towards his goals.   Patient " prognosis is Good.     Patient will continue to benefit from skilled outpatient physical therapy to address the deficits listed in the problem list box on initial evaluation, provide pt/family education and to maximize pt's level of independence in the home and community environment.     Patient's spiritual, cultural and educational needs considered and pt agreeable to plan of care and goals.     Anticipated barriers to physical therapy: none     Goals:   Short Term Goals: 3 weeks   Demonstrate improvement in recent symptoms to progress toward long term goals  > Progressing   Correct standing postural deficits to reduce pain and promote postural awareness for injury prevention > progressing   Demonstrate compliance with initial exercise program > Progressing      Long Term Goals: 6 weeks   Able to perform all household activities with no limitation   Able to perform all work related activities with no limitation   Perform hip AROM through all planes without symptoms or limitation  > Progressing   Able to ambulate community required distances with no limitation.   Ascend/descend flight of steps with no limitation   Able to perform transfers from all surfaces with no limitation  > MET   Able to return to golf with no limitation   FOTO score improvement to > 74  Independent with HEP for continued improvement in function.     Plan     Plan of care Certification: 7/22/2024 to 10/22/2024.     Outpatient Physical Therapy 2 times weekly for 6 weeks to include the following interventions: Manual Therapy, Neuromuscular Re-ed, Patient Education, Therapeutic Activities, and Therapeutic Exercise.        Rose Ballesteros, PT

## 2024-09-13 ENCOUNTER — CLINICAL SUPPORT (OUTPATIENT)
Dept: REHABILITATION | Facility: HOSPITAL | Age: 79
End: 2024-09-13
Payer: MEDICARE

## 2024-09-13 DIAGNOSIS — Z74.09 IMPAIRED FUNCTIONAL MOBILITY AND ACTIVITY TOLERANCE: Primary | ICD-10-CM

## 2024-09-13 PROCEDURE — 97530 THERAPEUTIC ACTIVITIES: CPT | Mod: KX,PN,CQ

## 2024-09-13 PROCEDURE — 97112 NEUROMUSCULAR REEDUCATION: CPT | Mod: KX,PN,CQ

## 2024-09-13 PROCEDURE — 97110 THERAPEUTIC EXERCISES: CPT | Mod: KX,PN,CQ

## 2024-09-13 NOTE — PROGRESS NOTES
[facility]   Physical Therapy Treatment Note      Name: Rod De La Paz  Clinic Number: [id number]    Therapy Diagnosis:   Encounter Diagnosis   Name Primary?    Impaired functional mobility and activity tolerance Yes     Physician: Luther Fischer MD    Visit Date: 9/13/2024    Physician Orders: PT Eval and Treat   Medical Diagnosis from Referral:   M70.61 (ICD-10-CM) - Trochanteric bursitis of right hip   Evaluation Date: 7/22/2024  Authorization Period Expiration: 12/31/2024  Plan of Care Expiration: 10/22/2024  Progress Note Due: 10/6/2024  Date of Surgery: n/a   Visit # / Visits authorized: 8 / 12    FOTO: 1/ 3     Precautions: Standard         Time In: 7:05  AM  Time Out: 8:00 AM   Total Billable Time: 45 minutes    PTA Visit #: 1/5       Subjective     Patient reports: No new c/o's.   He was compliant with home exercise program.  Response to previous treatment:  about 4 week hold secondary to Right sciatica pain - able to get RAYMOND 3 weeks ago with excellent results.   Functional change:  walking without increased symptoms in right hip; able to put weight through leg without pain;     Pain: 0 /10  Location: right hip      Objective      Objective Measures updated at progress report unless specified.     Treatment     Rod received the treatments listed below:      therapeutic exercises to develop strength, endurance, and ROM for 15 minutes including:  Recumbent bike : level 6 x 15 mins   Achilles stretch on wedge: 30 sec x 3  Single leg heel raises (R) x 15       manual therapy techniques: Soft tissue Mobilization were applied to the: right hip for 0 minutes, including:   IASTM to right lumbar spine, right posterior/superior hip;  PROM right hip through all planes with end range stretching.       neuromuscular re-education activities to improve: Balance, Coordination, and Posture for 15 minutes. The following activities were included:  Posterior pelvic tilt x 15  H/S stretch x 30 sec x  "2  Piriformis stretch (r) 30 sec x 2  Prone - lumbar extension x 6  Quadruped: cat/camel x 6   Toe-Taps while standing on Air-Ex mat - 8" step x 2 mins   SLS on Air-Ex on Right - 30 sec x 2  Tandem Stance on Air-Ex: 30 sec x 2   SLS (R) firm surface: move left leg into flex/abd/ext/rotation to challenge balance on right 30 sec x 3      therapeutic activities to improve functional performance for 15  minutes, including:  Step-ups: 8" step - lead with right - forward and lateral x 20 reps each  - on/off Air-Ex   3-way hip - green tband x 15 each   Squats x 15   Clams with blue band resistance x 15   S/L hip abduction  - blue band x 15  Bridges - blue band around thigh x 15  Supine - legs straight - blue band twisted around feet - alternate hip/knee flexion x 10       gait training to improve functional mobility and safety for   minutes, including:    Patient Education and Home Exercises       Education provided:   - continue with exercise, walking at home; stretching hip     Written Home Exercises Provided: Patient instructed to cont prior HEP. Exercises were reviewed and Rod was able to demonstrate them prior to the end of the session.  Rod demonstrated good  understanding of the education provided. See Electronic Medical Record under Patient Instructions for exercises provided during therapy sessions    Assessment     Rod is progressing well with improved mobility and daily function.  He feels like he is just about back to where he was prior to leaving for [place]. Strength improving as well  Mild right hip discomfort noted with higher impact activities.   Will benefit from continued Skilled PT for a couple of weeks to address lingering strength/mobility deficits in order to return patient to his PLOF.     Rod Is progressing well towards his goals.   Patient prognosis is Good.     Patient will continue to benefit from skilled outpatient physical therapy to address the deficits listed in the problem list box " on initial evaluation, provide pt/family education and to maximize pt's level of independence in the home and community environment.     Patient's spiritual, cultural and educational needs considered and pt agreeable to plan of care and goals.     Anticipated barriers to physical therapy: none     Goals:   Short Term Goals: 3 weeks   Demonstrate improvement in recent symptoms to progress toward long term goals  > Progressing   Correct standing postural deficits to reduce pain and promote postural awareness for injury prevention > progressing   Demonstrate compliance with initial exercise program > Progressing      Long Term Goals: 6 weeks   Able to perform all household activities with no limitation   Able to perform all work related activities with no limitation   Perform hip AROM through all planes without symptoms or limitation  > Progressing   Able to ambulate community required distances with no limitation.   Ascend/descend flight of steps with no limitation   Able to perform transfers from all surfaces with no limitation  > MET   Able to return to golf with no limitation   FOTO score improvement to > 74  Independent with HEP for continued improvement in function.     Plan     Plan of care Certification: 7/22/2024 to 10/22/2024.     Outpatient Physical Therapy 2 times weekly for 6 weeks to include the following interventions: Manual Therapy, Neuromuscular Re-ed, Patient Education, Therapeutic Activities, and Therapeutic Exercise.        Jonathan Favre, PTA

## 2024-09-16 ENCOUNTER — CLINICAL SUPPORT (OUTPATIENT)
Dept: REHABILITATION | Facility: HOSPITAL | Age: 79
End: 2024-09-16
Payer: MEDICARE

## 2024-09-16 DIAGNOSIS — Z74.09 IMPAIRED FUNCTIONAL MOBILITY AND ACTIVITY TOLERANCE: Primary | ICD-10-CM

## 2024-09-16 PROCEDURE — 97530 THERAPEUTIC ACTIVITIES: CPT | Mod: KX,PN,CQ

## 2024-09-16 PROCEDURE — 97110 THERAPEUTIC EXERCISES: CPT | Mod: KX,PN,CQ

## 2024-09-16 PROCEDURE — 97112 NEUROMUSCULAR REEDUCATION: CPT | Mod: KX,PN,CQ

## 2024-09-16 NOTE — PROGRESS NOTES
[facility]   Physical Therapy Treatment Note      Name: Rod De La Paz  Clinic Number: [id number]    Therapy Diagnosis:   Encounter Diagnosis   Name Primary?    Impaired functional mobility and activity tolerance Yes     Physician: Luther Fischer MD    Visit Date: 9/16/2024    Physician Orders: PT Eval and Treat   Medical Diagnosis from Referral:   M70.61 (ICD-10-CM) - Trochanteric bursitis of right hip   Evaluation Date: 7/22/2024  Authorization Period Expiration: 12/31/2024  Plan of Care Expiration: 10/22/2024  Progress Note Due: 10/6/2024  Date of Surgery: n/a   Visit # / Visits authorized: 9 / 12    FOTO: 1/ 3     Precautions: Standard         Time In: 8:00 AM  Time Out: 9:00 AM   Total Billable Time: 45 minutes    PTA Visit #: 2/5       Subjective     Patient reports: No new c/o's. I played 18 holes of golf yesterday.   He was compliant with home exercise program.  Response to previous treatment:  about 4 week hold secondary to Right sciatica pain - able to get RAYMOND 3 weeks ago with excellent results.   Functional change:  walking without increased symptoms in right hip; able to put weight through leg without pain;     Pain: 1-2 /10  Location: right hip      Objective      Objective Measures updated at progress report unless specified.     Treatment     Rod received the treatments listed below:      therapeutic exercises to develop strength, endurance, and ROM for 15 minutes including:  Recumbent bike : level 6 x 15 mins   Achilles stretch on wedge: 30 sec x 3  Single leg heel raises (R) x 15       manual therapy techniques: Soft tissue Mobilization were applied to the: right hip for 0 minutes, including:   IASTM to right lumbar spine, right posterior/superior hip;  PROM right hip through all planes with end range stretching.       neuromuscular re-education activities to improve: Balance, Coordination, and Posture for 15 minutes. The following activities were included:  Posterior  "pelvic tilt x 15  H/S stretch x 30 sec x 2  Piriformis stretch (r) 30 sec x 2  Prone - lumbar extension x 6  Quadruped: cat/camel x 6   Toe-Taps while standing on Air-Ex mat - 8" step x 2 mins   SLS on Air-Ex on Right - 30 sec x 2  Tandem Stance on Air-Ex: 30 sec x 2   SLS (R) firm surface: move left leg into flex/abd/ext/rotation to challenge balance on right 30 sec x 3      therapeutic activities to improve functional performance for 15  minutes, including:  Step-ups: 8" step - lead with right - forward and lateral x 20 reps each  - on/off Air-Ex   3-way hip - green tband x 15 each   Squats x 15   Clams with blue band resistance x 15   S/L hip abduction  - blue band x 15  Bridges - blue band around thigh x 15  Supine - legs straight - blue band twisted around feet - alternate hip/knee flexion x 10       gait training to improve functional mobility and safety for   minutes, including:    Patient Education and Home Exercises       Education provided:   - continue with exercise, walking at home; stretching hip     Written Home Exercises Provided: Patient instructed to cont prior HEP. Exercises were reviewed and Rod was able to demonstrate them prior to the end of the session.  Rod demonstrated good  understanding of the education provided. See Electronic Medical Record under Patient Instructions for exercises provided during therapy sessions    Assessment     Rod is progressing well with improved mobility and daily function.  He feels like he is just about back to where he was prior to leaving for [place]. Strength improving as well  Mild right hip discomfort noted with higher impact activities.   Will benefit from continued Skilled PT for a couple of weeks to address lingering strength/mobility deficits in order to return patient to his PLOF.     Rod Is progressing well towards his goals.   Patient prognosis is Good.     Patient will continue to benefit from skilled outpatient physical therapy to address the " deficits listed in the problem list box on initial evaluation, provide pt/family education and to maximize pt's level of independence in the home and community environment.     Patient's spiritual, cultural and educational needs considered and pt agreeable to plan of care and goals.     Anticipated barriers to physical therapy: none     Goals:   Short Term Goals: 3 weeks   Demonstrate improvement in recent symptoms to progress toward long term goals  > Progressing   Correct standing postural deficits to reduce pain and promote postural awareness for injury prevention > progressing   Demonstrate compliance with initial exercise program > Progressing      Long Term Goals: 6 weeks   Able to perform all household activities with no limitation   Able to perform all work related activities with no limitation   Perform hip AROM through all planes without symptoms or limitation  > Progressing   Able to ambulate community required distances with no limitation.   Ascend/descend flight of steps with no limitation   Able to perform transfers from all surfaces with no limitation  > MET   Able to return to golf with no limitation   FOTO score improvement to > 74  Independent with HEP for continued improvement in function.     Plan     Plan of care Certification: 7/22/2024 to 10/22/2024.     Outpatient Physical Therapy 2 times weekly for 6 weeks to include the following interventions: Manual Therapy, Neuromuscular Re-ed, Patient Education, Therapeutic Activities, and Therapeutic Exercise.        Jonathan Favre, PTA

## 2024-09-18 ENCOUNTER — CLINICAL SUPPORT (OUTPATIENT)
Dept: REHABILITATION | Facility: HOSPITAL | Age: 79
End: 2024-09-18
Payer: MEDICARE

## 2024-09-18 DIAGNOSIS — Z74.09 IMPAIRED FUNCTIONAL MOBILITY AND ACTIVITY TOLERANCE: Primary | ICD-10-CM

## 2024-09-18 PROCEDURE — 97110 THERAPEUTIC EXERCISES: CPT | Mod: KX,PN

## 2024-09-18 PROCEDURE — 97530 THERAPEUTIC ACTIVITIES: CPT | Mod: KX,PN

## 2024-09-18 PROCEDURE — 97140 MANUAL THERAPY 1/> REGIONS: CPT | Mod: KX,PN

## 2024-09-18 PROCEDURE — 97112 NEUROMUSCULAR REEDUCATION: CPT | Mod: KX,PN

## 2024-09-18 NOTE — PROGRESS NOTES
[facility]   Physical Therapy Treatment Note      Name: Rod De La Paz  Clinic Number: [id number]    Therapy Diagnosis:   Encounter Diagnosis   Name Primary?    Impaired functional mobility and activity tolerance Yes     Physician: Luther Fischer MD    Visit Date: 9/18/2024    Physician Orders: PT Eval and Treat   Medical Diagnosis from Referral:   M70.61 (ICD-10-CM) - Trochanteric bursitis of right hip   Evaluation Date: 7/22/2024  Authorization Period Expiration: 12/31/2024  Plan of Care Expiration: 10/22/2024  Progress Note Due: 10/6/2024  Date of Surgery: n/a   Visit # / Visits authorized: 10  / 12    FOTO: 1/ 3     Precautions: Standard         Time In: 8:45 AM  Time Out:  9:45 AM   Total Billable Time: 60 minutes    PTA Visit #: 0 /5       Subjective     Patient reports: I'm doing well; no hip pain, just some lower back discomfort at times.   He was compliant with home exercise program.  Response to previous treatment:  good   Functional change:  walking more; playing golf; able to walk in yard without increased symptoms.     Pain: 1-2 /10  Location: right hip      Objective      Objective Measures updated at progress report unless specified.     Treatment     Rod received the treatments listed below:      therapeutic exercises to develop strength, endurance, and ROM for 15 minutes including:  Recumbent bike : level 6 x 15 mins   Achilles stretch on wedge: 30 sec x 3  Single leg heel raises (R) x 15       manual therapy techniques: Soft tissue Mobilization were applied to the: right hip for 10 minutes, including:   IASTM to right lumbar spine, right posterior/superior hip;  PROM right hip through all planes with end range stretching.  S/L segmental lumbar flexion with blocking of segment above - L1 through L5/S1;       neuromuscular re-education activities to improve: Balance, Coordination, and Posture for 15 minutes. The following activities were included:  Posterior pelvic tilt x  "15  H/S stretch x 30 sec x 2  Piriformis stretch (r) 30 sec x 2  Prone - lumbar extension x 6  Quadruped: cat/camel x 6   Toe-Taps while standing on Air-Ex mat - 8" step x 2 mins   SLS on Air-Ex on Right - 30 sec x 2  Tandem Stance on Air-Ex: 30 sec x 2   SLS (R) firm surface: move left leg into flex/abd/ext/rotation to challenge balance on right 30 sec x 3      therapeutic activities to improve functional performance for 15  minutes, including:  Step-ups: 8" step - lead with right - forward and lateral x 20 reps each  - on/off Air-Ex   3-way hip - green tband x 15 each   Squats x 15   Clams with blue band resistance x 15   S/L hip abduction  - blue band x 15  Bridges - blue band around thigh x 15  Supine - legs straight - blue band twisted around feet - alternate hip/knee flexion x 10       gait training to improve functional mobility and safety for   minutes, including:    Patient Education and Home Exercises       Education provided:   -discussed starting a walking program to get ready for his trip in October - river cruise with side-trips that will entail quite a bit of walking, which Rod is not used too. He was in agreement with this.   - watch body mechanics with lifting to protect back.     Written Home Exercises Provided: Patient instructed to cont prior HEP. Exercises were reviewed and Rod was able to demonstrate them prior to the end of the session.  Rod demonstrated good  understanding of the education provided. See Electronic Medical Record under Patient Instructions for exercises provided during therapy sessions    Assessment     Rod is progressing well with improved mobility and daily function.  He feels like he is just about back to where he was prior to leaving for [place]. Strength improving as well  Mild right hip discomfort noted with higher impact activities.  Still having some lumbar discomfort with prolonged activities;  Will benefit from continued Skilled PT for a couple of weeks to " address lingering strength/mobility deficits in order to return patient to his PLOF.     Rod Is progressing well towards his goals.   Patient prognosis is Good.     Patient will continue to benefit from skilled outpatient physical therapy to address the deficits listed in the problem list box on initial evaluation, provide pt/family education and to maximize pt's level of independence in the home and community environment.     Patient's spiritual, cultural and educational needs considered and pt agreeable to plan of care and goals.     Anticipated barriers to physical therapy: none     Goals:   Short Term Goals: 3 weeks   Demonstrate improvement in recent symptoms to progress toward long term goals  > Progressing   Correct standing postural deficits to reduce pain and promote postural awareness for injury prevention > progressing   Demonstrate compliance with initial exercise program > Progressing      Long Term Goals: 6 weeks   Able to perform all household activities with no limitation   Able to perform all work related activities with no limitation   Perform hip AROM through all planes without symptoms or limitation  > Progressing   Able to ambulate community required distances with no limitation.   Ascend/descend flight of steps with no limitation   Able to perform transfers from all surfaces with no limitation  > MET   Able to return to golf with no limitation   FOTO score improvement to > 74  Independent with HEP for continued improvement in function.     Plan     Plan of care Certification: 7/22/2024 to 10/22/2024.     Outpatient Physical Therapy 2 times weekly for 6 weeks to include the following interventions: Manual Therapy, Neuromuscular Re-ed, Patient Education, Therapeutic Activities, and Therapeutic Exercise.        Rose Ballesteros, PT

## 2024-09-23 ENCOUNTER — CLINICAL SUPPORT (OUTPATIENT)
Dept: REHABILITATION | Facility: HOSPITAL | Age: 79
End: 2024-09-23
Payer: MEDICARE

## 2024-09-23 DIAGNOSIS — Z74.09 IMPAIRED FUNCTIONAL MOBILITY AND ACTIVITY TOLERANCE: Primary | ICD-10-CM

## 2024-09-23 PROCEDURE — 97530 THERAPEUTIC ACTIVITIES: CPT | Mod: KX,PN

## 2024-09-23 PROCEDURE — 97110 THERAPEUTIC EXERCISES: CPT | Mod: KX,PN

## 2024-09-23 PROCEDURE — 97112 NEUROMUSCULAR REEDUCATION: CPT | Mod: KX,PN

## 2024-09-23 NOTE — PROGRESS NOTES
[facility]   Physical Therapy Treatment Note      Name: Rod De La Paz  Clinic Number: [id number]    Therapy Diagnosis:   Encounter Diagnosis   Name Primary?    Impaired functional mobility and activity tolerance Yes     Physician: Luther Fischer MD    Visit Date: 9/23/2024    Physician Orders: PT Eval and Treat   Medical Diagnosis from Referral:   M70.61 (ICD-10-CM) - Trochanteric bursitis of right hip   Evaluation Date: 7/22/2024  Authorization Period Expiration: 12/31/2024  Plan of Care Expiration: 10/22/2024  Progress Note Due: 10/6/2024  Date of Surgery: n/a   Visit # / Visits authorized: 11 / 12    FOTO: 1/ 3     Precautions: Standard         Time In: 8:40 AM  Time Out:  9:35 AM   Total Billable Time: 45  minutes    PTA Visit #: 0 /5       Subjective     Patient reports: I'm doing well; no hip pain, just some lower back discomfort at times.  He was compliant with home exercise program.  Response to previous treatment:  good   Functional change:  walking more; playing golf; able to walk in yard without increased symptoms.     Pain: 1-2 /10  Location: right hip      Objective      Objective Measures updated at progress report unless specified.     Treatment     Rod received the treatments listed below:      therapeutic exercises to develop strength, endurance, and ROM for 15 minutes including:  Recumbent bike : level 6 x 15 mins   Achilles stretch on wedge: 30 sec x 3  Single leg heel raises (R) x 15       manual therapy techniques: Soft tissue Mobilization were applied to the: right hip for 10 minutes, including:   IASTM to right lumbar spine, right posterior/superior hip;  PROM right hip through all planes with end range stretching.  S/L segmental lumbar flexion with blocking of segment above - L1 through L5/S1;       neuromuscular re-education activities to improve: Balance, Coordination, and Posture for 15 minutes. The following activities were included:  Posterior pelvic tilt x  "15  H/S stretch x 30 sec x 2  Piriformis stretch (r) 30 sec x 2  Prone - lumbar extension x 6  Quadruped: cat/camel x 6   Toe-Taps while standing on Air-Ex mat - 8" step x 2 mins   SLS on Air-Ex on Right - 30 sec x 2  Tandem Stance on Air-Ex: 30 sec x 2   SLS (R) firm surface: move left leg into flex/abd/ext/rotation to challenge balance on right 30 sec x 3      therapeutic activities to improve functional performance for 20  minutes, including:  Step-ups: 8" step - lead with right - forward and lateral x 20 reps each  - on/off Air-Ex   3-way hip - blue tband x 15 each   Squats x 15   Clams with blue band resistance x 15   S/L hip abduction  - blue band x 15  Bridges - blue band around thigh x 15  Supine - legs straight - blue band twisted around feet - alternate hip/knee flexion x 10       gait training to improve functional mobility and safety for   minutes, including:    Patient Education and Home Exercises       Education provided:   -discussed starting a walking program to get ready for his trip in October - river cruise with side-trips that will entail quite a bit of walking, which Rod is not used too. He was in agreement with this.   - watch body mechanics with lifting to protect back.     Written Home Exercises Provided: Patient instructed to cont prior HEP. Exercises were reviewed and Rod was able to demonstrate them prior to the end of the session.  Rod demonstrated good  understanding of the education provided. See Electronic Medical Record under Patient Instructions for exercises provided during therapy sessions    Assessment     Rod is progressing well with improved mobility and daily function.   Strength improving as well  Mild right hip discomfort noted with higher impact activities.  Still having some lumbar discomfort with prolonged activities;  Will benefit from continued Skilled PT for a couple of weeks to address lingering strength/mobility deficits in order to return patient to his PLOF. "     Rod Is progressing well towards his goals.   Patient prognosis is Good.     Patient will continue to benefit from skilled outpatient physical therapy to address the deficits listed in the problem list box on initial evaluation, provide pt/family education and to maximize pt's level of independence in the home and community environment.     Patient's spiritual, cultural and educational needs considered and pt agreeable to plan of care and goals.     Anticipated barriers to physical therapy: none     Goals:   Short Term Goals: 3 weeks   Demonstrate improvement in recent symptoms to progress toward long term goals  > Progressing   Correct standing postural deficits to reduce pain and promote postural awareness for injury prevention > progressing   Demonstrate compliance with initial exercise program > Progressing      Long Term Goals: 6 weeks   Able to perform all household activities with no limitation   Able to perform all work related activities with no limitation   Perform hip AROM through all planes without symptoms or limitation  > Progressing   Able to ambulate community required distances with no limitation.   Ascend/descend flight of steps with no limitation   Able to perform transfers from all surfaces with no limitation  > MET   Able to return to golf with no limitation   FOTO score improvement to > 74  Independent with HEP for continued improvement in function.     Plan     Plan of care Certification: 7/22/2024 to 10/22/2024.     Outpatient Physical Therapy 2 times weekly for 6 weeks to include the following interventions: Manual Therapy, Neuromuscular Re-ed, Patient Education, Therapeutic Activities, and Therapeutic Exercise.        Rose Ballesteros, PT

## 2024-09-25 ENCOUNTER — CLINICAL SUPPORT (OUTPATIENT)
Dept: REHABILITATION | Facility: HOSPITAL | Age: 79
End: 2024-09-25
Payer: MEDICARE

## 2024-09-25 DIAGNOSIS — Z74.09 IMPAIRED FUNCTIONAL MOBILITY AND ACTIVITY TOLERANCE: Primary | ICD-10-CM

## 2024-09-25 PROCEDURE — 97110 THERAPEUTIC EXERCISES: CPT | Mod: PN

## 2024-09-25 PROCEDURE — 97530 THERAPEUTIC ACTIVITIES: CPT | Mod: PN

## 2024-09-25 PROCEDURE — 97112 NEUROMUSCULAR REEDUCATION: CPT | Mod: PN

## 2024-09-25 NOTE — PROGRESS NOTES
[facility]   Physical Therapy Treatment Note      Name: Rod De La Paz  Clinic Number: [id number]    Therapy Diagnosis:   Encounter Diagnosis   Name Primary?    Impaired functional mobility and activity tolerance Yes     Physician: Luther Fischer MD    Visit Date: 9/25/2024    Physician Orders: PT Eval and Treat   Medical Diagnosis from Referral:   M70.61 (ICD-10-CM) - Trochanteric bursitis of right hip   Evaluation Date: 7/22/2024  Authorization Period Expiration: 12/31/2024  Plan of Care Expiration: 10/22/2024  Progress Note Due: 10/6/2024  Date of Surgery: n/a   Visit # / Visits authorized: 12 / 12    FOTO: 1/ 3     Precautions: Standard         Time In: 8:45AM  Time Out:  9:35 AM   Total Billable Time: 45  minutes    PTA Visit #: 0 /5       Subjective     Patient reports: I'm doing well; no hip pain, just some lower back discomfort at times.  He was compliant with home exercise program.  Response to previous treatment:  good   Functional change:  walking more; playing golf; able to walk in yard without increased symptoms.     Pain: 1-2 /10  Location: right hip      Objective      Objective Measures updated at progress report unless specified.     Treatment     Rod received the treatments listed below:      therapeutic exercises to develop strength, endurance, and ROM for 15 minutes including:  Recumbent bike : level 6 x 15 mins   Achilles stretch on wedge: 30 sec x 3  Single leg heel raises (R) x 15       manual therapy techniques: Soft tissue Mobilization were applied to the: right hip for 0 minutes, including:   IASTM to right lumbar spine, right posterior/superior hip;  PROM right hip through all planes with end range stretching.  S/L segmental lumbar flexion with blocking of segment above - L1 through L5/S1;       neuromuscular re-education activities to improve: Balance, Coordination, and Posture for 15 minutes. The following activities were included:  Posterior pelvic tilt x  "15  H/S stretch x 30 sec x 2  Piriformis stretch (r) 30 sec x 2  Prone - lumbar extension x 6  Quadruped: cat/camel x 6   Toe-Taps while standing on Air-Ex mat - 8" step x 2 mins   SLS on Air-Ex on Right - 30 sec x 2  Tandem Stance on Air-Ex: 30 sec x 2   SLS (R) firm surface: move left leg into flex/abd/ext/rotation to challenge balance on right 30 sec x 3      therapeutic activities to improve functional performance for 20  minutes, including:  Step-ups: 8" step - lead with right - forward and lateral x 20 reps each  - on/off Air-Ex   3-way hip - blue tband x 15 each   Squats x 15   Clams with blue band resistance x 15   S/L hip abduction  - blue band x 15  Bridges - blue band around thigh x 15  Supine - legs straight - blue band twisted around feet - alternate hip/knee flexion x 10       gait training to improve functional mobility and safety for   minutes, including:    Patient Education and Home Exercises       Education provided:   -discussed starting a walking program to get ready for his trip in October - river cruise with side-trips that will entail quite a bit of walking, which Rod is not used too. He was in agreement with this.   - watch body mechanics with lifting to protect back.     Written Home Exercises Provided: Patient instructed to cont prior HEP. Exercises were reviewed and Rod was able to demonstrate them prior to the end of the session.  Rod demonstrated good  understanding of the education provided. See Electronic Medical Record under Patient Instructions for exercises provided during therapy sessions    Assessment     Rod is progressing well with improved mobility and daily function.   Strength improving as well  Mild right hip discomfort noted with higher impact activities.  Still having some lumbar discomfort with prolonged activities;  Will benefit from continued Skilled PT for a couple of weeks to address lingering strength/mobility deficits in order to return patient to his PLOF. "     Rod Is progressing well towards his goals.   Patient prognosis is Good.     Patient will continue to benefit from skilled outpatient physical therapy to address the deficits listed in the problem list box on initial evaluation, provide pt/family education and to maximize pt's level of independence in the home and community environment.     Patient's spiritual, cultural and educational needs considered and pt agreeable to plan of care and goals.     Anticipated barriers to physical therapy: none     Goals:   Short Term Goals: 3 weeks   Demonstrate improvement in recent symptoms to progress toward long term goals  > Progressing   Correct standing postural deficits to reduce pain and promote postural awareness for injury prevention > progressing   Demonstrate compliance with initial exercise program > Progressing      Long Term Goals: 6 weeks   Able to perform all household activities with no limitation   Able to perform all work related activities with no limitation   Perform hip AROM through all planes without symptoms or limitation  > Progressing   Able to ambulate community required distances with no limitation.   Ascend/descend flight of steps with no limitation   Able to perform transfers from all surfaces with no limitation  > MET   Able to return to golf with no limitation   FOTO score improvement to > 74  Independent with HEP for continued improvement in function.     Plan     Plan of care Certification: 7/22/2024 to 10/22/2024.     Outpatient Physical Therapy 2 times weekly for 6 weeks to include the following interventions: Manual Therapy, Neuromuscular Re-ed, Patient Education, Therapeutic Activities, and Therapeutic Exercise.        Rose Ballesteros, PT

## 2024-09-30 ENCOUNTER — CLINICAL SUPPORT (OUTPATIENT)
Dept: REHABILITATION | Facility: HOSPITAL | Age: 79
End: 2024-09-30
Payer: MEDICARE

## 2024-09-30 DIAGNOSIS — Z74.09 IMPAIRED FUNCTIONAL MOBILITY AND ACTIVITY TOLERANCE: Primary | ICD-10-CM

## 2024-09-30 PROCEDURE — 97530 THERAPEUTIC ACTIVITIES: CPT | Mod: KX,PN,CQ

## 2024-09-30 PROCEDURE — 97112 NEUROMUSCULAR REEDUCATION: CPT | Mod: KX,PN,CQ

## 2024-09-30 PROCEDURE — 97110 THERAPEUTIC EXERCISES: CPT | Mod: KX,PN,CQ

## 2024-09-30 NOTE — PROGRESS NOTES
[facility]   Physical Therapy Treatment Note      Name: Rod De La Paz  Clinic Number: [id number]    Therapy Diagnosis:   Encounter Diagnosis   Name Primary?    Impaired functional mobility and activity tolerance Yes     Physician: Luther Fischer MD    Visit Date: 9/30/2024    Physician Orders: PT Eval and Treat   Medical Diagnosis from Referral:   M70.61 (ICD-10-CM) - Trochanteric bursitis of right hip   Evaluation Date: 7/22/2024  Authorization Period Expiration: 12/31/2024  Plan of Care Expiration: 10/22/2024  Progress Note Due: 10/6/2024  Date of Surgery: n/a   Visit # / Visits authorized: 13 / 13  FOTO: 1/ 3     Precautions: Standard         Time In: 7:55 AM  Time Out:  8:50 AM   Total Billable Time: 45 minutes    PTA Visit #: 1/5       Subjective     Patient reports: No new c/o's.   He was compliant with home exercise program.  Response to previous treatment:  good   Functional change:  walking more; playing golf; able to walk in yard without increased symptoms.     Pain: 1/10  Location: right hip      Objective      Objective Measures updated at progress report unless specified.     Treatment     Rod received the treatments listed below:      therapeutic exercises to develop strength, endurance, and ROM for 15 minutes including:  Recumbent bike : level 6 x 15 mins   Achilles stretch on wedge: 30 sec x 3  Single leg heel raises (R) x 15       manual therapy techniques: Soft tissue Mobilization were applied to the: right hip for 0 minutes, including:   IASTM to right lumbar spine, right posterior/superior hip;  PROM right hip through all planes with end range stretching.  S/L segmental lumbar flexion with blocking of segment above - L1 through L5/S1;       neuromuscular re-education activities to improve: Balance, Coordination, and Posture for 15 minutes. The following activities were included:  Posterior pelvic tilt x 15  H/S stretch x 30 sec x 2  Piriformis stretch (r) 30 sec x  "2  Prone - lumbar extension x 6  Quadruped: cat/camel x 6   Toe-Taps while standing on Air-Ex mat - 8" step x 2 mins   SLS on Air-Ex on Right - 30 sec x 2  Tandem Stance on Air-Ex: 30 sec x 2   SLS (R) firm surface: move left leg into flex/abd/ext/rotation to challenge balance on right 30 sec x 3      therapeutic activities to improve functional performance for 20  minutes, including:  Step-ups: 8" step - lead with right - forward and lateral x 20 reps each  - on/off Air-Ex   3-way hip - blue tband x 15 each   Squats x 15   Clams with blue band resistance x 15   S/L hip abduction  - blue band x 15  Bridges - blue band around thigh x 15  Supine - legs straight - blue band twisted around feet - alternate hip/knee flexion x 10       gait training to improve functional mobility and safety for   minutes, including:    Patient Education and Home Exercises       Education provided:   -discussed starting a walking program to get ready for his trip in October - river cruise with side-trips that will entail quite a bit of walking, which Rod is not used too. He was in agreement with this.   - watch body mechanics with lifting to protect back.     Written Home Exercises Provided: Patient instructed to cont prior HEP. Exercises were reviewed and Rod was able to demonstrate them prior to the end of the session.  Rod demonstrated good  understanding of the education provided. See Electronic Medical Record under Patient Instructions for exercises provided during therapy sessions    Assessment     Rod is progressing well with improved mobility and daily function.   Strength improving as well  Mild right hip discomfort noted with higher impact activities.  Still having some lumbar discomfort with prolonged activities;  Will benefit from continued Skilled PT for a couple of weeks to address lingering strength/mobility deficits in order to return patient to his PLOF.     Rod Is progressing well towards his goals.   Patient " prognosis is Good.     Patient will continue to benefit from skilled outpatient physical therapy to address the deficits listed in the problem list box on initial evaluation, provide pt/family education and to maximize pt's level of independence in the home and community environment.     Patient's spiritual, cultural and educational needs considered and pt agreeable to plan of care and goals.     Anticipated barriers to physical therapy: none     Goals:   Short Term Goals: 3 weeks   Demonstrate improvement in recent symptoms to progress toward long term goals  > Progressing   Correct standing postural deficits to reduce pain and promote postural awareness for injury prevention > progressing   Demonstrate compliance with initial exercise program > Progressing      Long Term Goals: 6 weeks   Able to perform all household activities with no limitation   Able to perform all work related activities with no limitation   Perform hip AROM through all planes without symptoms or limitation  > Progressing   Able to ambulate community required distances with no limitation.   Ascend/descend flight of steps with no limitation   Able to perform transfers from all surfaces with no limitation  > MET   Able to return to golf with no limitation   FOTO score improvement to > 74  Independent with HEP for continued improvement in function.     Plan     Plan of care Certification: 7/22/2024 to 10/22/2024.     Outpatient Physical Therapy 2 times weekly for 6 weeks to include the following interventions: Manual Therapy, Neuromuscular Re-ed, Patient Education, Therapeutic Activities, and Therapeutic Exercise.        Jonathan Favre, PTA

## 2024-10-02 ENCOUNTER — CLINICAL SUPPORT (OUTPATIENT)
Dept: REHABILITATION | Facility: HOSPITAL | Age: 79
End: 2024-10-02
Payer: MEDICARE

## 2024-10-02 DIAGNOSIS — Z74.09 IMPAIRED FUNCTIONAL MOBILITY AND ACTIVITY TOLERANCE: Primary | ICD-10-CM

## 2024-10-02 PROCEDURE — 97112 NEUROMUSCULAR REEDUCATION: CPT | Mod: KX,PN

## 2024-10-02 PROCEDURE — 97110 THERAPEUTIC EXERCISES: CPT | Mod: KX,PN

## 2024-10-02 PROCEDURE — 97530 THERAPEUTIC ACTIVITIES: CPT | Mod: KX,PN

## 2024-10-02 NOTE — PROGRESS NOTES
[facility]   Physical Therapy Treatment Note      Name: Rod De La Paz  Clinic Number: [id number]    Therapy Diagnosis:   Encounter Diagnosis   Name Primary?    Impaired functional mobility and activity tolerance Yes     Physician: Luther Fischer MD    Visit Date: 10/2/2024    Physician Orders: PT Eval and Treat   Medical Diagnosis from Referral:   M70.61 (ICD-10-CM) - Trochanteric bursitis of right hip   Evaluation Date: 7/22/2024  Authorization Period Expiration: 12/31/2024  Plan of Care Expiration: 10/22/2024  Progress Note Due: 10/1/2024  Date of Surgery: n/a   Visit # / Visits authorized: 14  FOTO: 3/ 3     Precautions: Standard         Time In: 7:55 AM  Time Out:  8:50 AM   Total Billable Time: 45 minutes    PTA Visit #: 0/5       Subjective     Patient reports: No new c/o's regarding lower back or right hip; noticing some foot slap on Right - especially with faster pace walking   He was compliant with home exercise program.  Response to previous treatment:  good   Functional change:  walking more; playing golf; able to walk in yard without increased symptoms.     Pain: 1/10  Location: right hip      Objective      Objective Measures updated at progress report unless specified.     Treatment     Rod received the treatments listed below:      therapeutic exercises to develop strength, endurance, and ROM for 15 minutes including:  Recumbent bike : level 6 x 15 mins   Achilles stretch on wedge: 30 sec x 3  Single leg heel raises (R) x 15       manual therapy techniques: Soft tissue Mobilization were applied to the: right hip for 0 minutes, including:   IASTM to right lumbar spine, right posterior/superior hip;  PROM right hip through all planes with end range stretching.  S/L segmental lumbar flexion with blocking of segment above - L1 through L5/S1;       neuromuscular re-education activities to improve: Balance, Coordination, and Posture for 15 minutes. The following activities were  "included:  Instructed patient in resistive DF with use of green tband - DF/Eversion  Posterior pelvic tilt x 15  H/S stretch x 30 sec x 2  Piriformis stretch (r) 30 sec x 2  Prone - lumbar extension x 6  Quadruped: cat/camel x 6   Toe-Taps while standing on Air-Ex mat - 8" step x 2 mins   SLS on Air-Ex on Right - 30 sec x 2  Tandem Stance on Air-Ex: 30 sec x 2   SLS (R) firm surface: move left leg into flex/abd/ext/rotation to challenge balance on right 30 sec x 3      therapeutic activities to improve functional performance for 20  minutes, including:  Step-ups: 8" step - lead with right - forward and lateral x 20 reps each  - on/off Air-Ex   3-way hip - blue tband x 15 each   Squats x 15   Clams with blue band resistance x 15   S/L hip abduction  - blue band x 15  Bridges - blue band around thigh x 15  Supine - legs straight - blue band twisted around feet - alternate hip/knee flexion x 10       gait training to improve functional mobility and safety for   minutes, including:    Patient Education and Home Exercises       Education provided:   -reason for foot drop on right  - linked to neuropathy, lumbar problem; attention to walking, gave him exercises to address DF.    - watch body mechanics with lifting to protect back.     Written Home Exercises Provided: Patient instructed to cont prior HEP. Exercises were reviewed and Rod was able to demonstrate them prior to the end of the session.  Rod demonstrated good  understanding of the education provided. See Electronic Medical Record under Patient Instructions for exercises provided during therapy sessions    Assessment     Rod is progressing well with improved mobility and daily function.   Strength improving as well  Mild right hip discomfort noted with higher impact activities.  Still having some lumbar discomfort with prolonged activities;  Noted some right foot slapping with gait today - addressed with HEP for DF; Rod is leaving next week for cruise.  " He is doing well enough that he could transition to HEP/Gym program at this point; Offered him the opportunity to continue with Skilled therapy following his trip if he noted any decline in function or increased RLE discomfort.  He will contact us upon return and let me know.     Rod Is progressing well towards his goals.   Patient prognosis is Good.     Patient will continue to benefit from skilled outpatient physical therapy to address the deficits listed in the problem list box on initial evaluation, provide pt/family education and to maximize pt's level of independence in the home and community environment.     Patient's spiritual, cultural and educational needs considered and pt agreeable to plan of care and goals.     Anticipated barriers to physical therapy: none     Goals:   Short Term Goals: 3 weeks   Demonstrate improvement in recent symptoms to progress toward long term goals  > Progressing   Correct standing postural deficits to reduce pain and promote postural awareness for injury prevention > progressing   Demonstrate compliance with initial exercise program > Progressing      Long Term Goals: 6 weeks   Able to perform all household activities with no limitation  > MET   Able to perform all work related activities with no limitation  > Progressing   Perform hip AROM through all planes without symptoms or limitation  > Progressing   Able to ambulate community required distances with no limitation.  > Progressing   Ascend/descend flight of steps with no limitation  > MET   Able to perform transfers from all surfaces with no limitation  > MET   Able to return to golf with no limitation  > MET   FOTO score improvement to > 74  Independent with HEP for continued improvement in function.     Plan     Plan of care Certification: 7/22/2024 to 10/22/2024.     Outpatient Physical Therapy 2 times weekly for 6 weeks to include the following interventions: Manual Therapy, Neuromuscular Re-ed, Patient Education,  Therapeutic Activities, and Therapeutic Exercise.        Rose Ballesteros, PT

## 2024-10-22 ENCOUNTER — CLINICAL SUPPORT (OUTPATIENT)
Dept: REHABILITATION | Facility: HOSPITAL | Age: 79
End: 2024-10-22
Payer: MEDICARE

## 2024-10-22 DIAGNOSIS — Z74.09 IMPAIRED FUNCTIONAL MOBILITY AND ACTIVITY TOLERANCE: Primary | ICD-10-CM

## 2024-10-22 PROCEDURE — 97530 THERAPEUTIC ACTIVITIES: CPT | Mod: KX,PN

## 2024-10-22 PROCEDURE — 97112 NEUROMUSCULAR REEDUCATION: CPT | Mod: KX,PN

## 2024-10-22 NOTE — PROGRESS NOTES
[facility]   Physical Therapy Treatment Note      Name: Rod De La Paz  Clinic Number: [id number]    Therapy Diagnosis:   Encounter Diagnosis   Name Primary?    Impaired functional mobility and activity tolerance Yes     Physician: Luther Fischer MD    Visit Date: 10/22/2024    Physician Orders: PT Eval and Treat   Medical Diagnosis from Referral:   M70.61 (ICD-10-CM) - Trochanteric bursitis of right hip   Evaluation Date: 7/22/2024  Authorization Period Expiration: 12/31/2024  Plan of Care Expiration: 10/22/2024 - update POC expiration 12/31/2024  Progress Note Due:   Date of Surgery: n/a   Visit # / Visits authorized: 15  FOTO: 3/ 3     Precautions: Standard         Time In: 9:30  AM  Time Out:  10:25  AM   Total Billable Time: 30 minutes - Split billing     PTA Visit #: 0/5       Subjective     Patient reports: Back from trip - had a great time, did ok while on the Yacht - able to balance himself well.  Continues to note right foot slap when he walks - faster walking he notices it more.   Realizes that he needs to do more walking for his LE's.    He was compliant with home exercise program.  Response to previous treatment:  good   Functional change:  walking more; playing golf; able to walk in yard without increased symptoms.     Pain: 1/10  Location: right hip      Objective      Objective Measures updated at progress report unless specified.     Treatment     Rod received the treatments listed below:      therapeutic exercises to develop strength, endurance, and ROM for 15 minutes including:  Trial of Elliptical: level 1 x 7 mins - LE's only - initially had trouble getting knees into better extension to support himself, required CGA initially, but then able to handle himself.   Recumbent bike : level 6 x 15 mins   Achilles stretch on wedge: 30 sec x 3  Single leg heel raises (R) x 15       manual therapy techniques: Soft tissue Mobilization were applied to the: right hip for 0  "minutes, including:   IASTM to right lumbar spine, right posterior/superior hip;  PROM right hip through all planes with end range stretching.  S/L segmental lumbar flexion with blocking of segment above - L1 through L5/S1;       neuromuscular re-education activities to improve: Balance, Coordination, and Posture for 15 minutes. The following activities were included:  Instructed patient in resistive DF with use of blue  tband - DF/Eversion  Posterior pelvic tilt x 15  H/S stretch x 30 sec x 3 on each leg - pull foot into DF and push against strap into PF for eccentric work   Piriformis stretch (r) 30 sec x 2  Prone - lumbar extension x 6  Quadruped: cat/camel x 6   Toe-Taps while standing on Air-Ex mat - 8" step x 2 mins   SLS on Air-Ex on Right - 30 sec x 2  Tandem Stance on Air-Ex: 30 sec x 2   SLS (R) firm surface: move left leg into flex/abd/ext/rotation to challenge balance on right 30 sec x 3      therapeutic activities to improve functional performance for 20  minutes, including:  Step-ups: 8" step - lead with right - forward and lateral x 20 reps each  - on/off Air-Ex   3-way hip - blue tband x 15 each   Squats x 15   Clams with blue band resistance x 15   S/L hip abduction  - blue band x 15  Bridges - blue band around thigh x 15  Supine - legs straight - blue band twisted around feet - alternate hip/knee flexion x 10       gait training to improve functional mobility and safety for   minutes, including:    Patient Education and Home Exercises       Education provided:   -reason for foot drop on right  - linked to neuropathy, lumbar problem; attention to walking, gave him exercises to address DF.    - watch body mechanics with lifting to protect back.     Written Home Exercises Provided: Patient instructed to cont prior HEP. Exercises were reviewed and Rod was able to demonstrate them prior to the end of the session.  Rod demonstrated good  understanding of the education provided. See Electronic Medical " Record under Patient Instructions for exercises provided during therapy sessions    Assessment     Rod is progressing well with improved mobility and daily function.   Strength improving as well  Mild right hip discomfort noted with higher impact activities.  Still having some lumbar discomfort with prolonged activities;  Noted some right foot slapping with gait today - addressed with HEP for DF;   Will continue with lower back/hip strengthening and addressing of right foot slap with walking.  Complete current POC.     Rod Is progressing well towards his goals.   Patient prognosis is Good.     Patient will continue to benefit from skilled outpatient physical therapy to address the deficits listed in the problem list box on initial evaluation, provide pt/family education and to maximize pt's level of independence in the home and community environment.     Patient's spiritual, cultural and educational needs considered and pt agreeable to plan of care and goals.     Anticipated barriers to physical therapy: none     Goals:   Short Term Goals: 3 weeks   Demonstrate improvement in recent symptoms to progress toward long term goals  > Progressing   Correct standing postural deficits to reduce pain and promote postural awareness for injury prevention > progressing   Demonstrate compliance with initial exercise program > Progressing      Long Term Goals: 6 weeks   Able to perform all household activities with no limitation  > MET   Able to perform all work related activities with no limitation  > Progressing   Perform hip AROM through all planes without symptoms or limitation  > Progressing   Able to ambulate community required distances with no limitation.  > Progressing   Ascend/descend flight of steps with no limitation  > MET   Able to perform transfers from all surfaces with no limitation  > MET   Able to return to golf with no limitation  > MET   FOTO score improvement to > 74  Independent with HEP for continued  improvement in function.     Plan     Plan of care Certification: 10/22/2024 > 12/31/2024     Outpatient Physical Therapy 2 times weekly for 4-6 weeks to include the following interventions: Manual Therapy, Neuromuscular Re-ed, Patient Education, Therapeutic Activities, and Therapeutic Exercise.        Rose Ballesteros, PT

## 2024-10-28 ENCOUNTER — CLINICAL SUPPORT (OUTPATIENT)
Dept: REHABILITATION | Facility: HOSPITAL | Age: 79
End: 2024-10-28
Payer: MEDICARE

## 2024-10-28 DIAGNOSIS — Z74.09 IMPAIRED FUNCTIONAL MOBILITY AND ACTIVITY TOLERANCE: Primary | ICD-10-CM

## 2024-10-28 PROCEDURE — 97110 THERAPEUTIC EXERCISES: CPT | Mod: KX,PN,CQ

## 2024-10-28 PROCEDURE — 97112 NEUROMUSCULAR REEDUCATION: CPT | Mod: KX,PN,CQ

## 2024-10-28 PROCEDURE — 97530 THERAPEUTIC ACTIVITIES: CPT | Mod: KX,PN,CQ

## 2024-10-30 ENCOUNTER — CLINICAL SUPPORT (OUTPATIENT)
Dept: REHABILITATION | Facility: HOSPITAL | Age: 79
End: 2024-10-30
Payer: MEDICARE

## 2024-10-30 DIAGNOSIS — Z74.09 IMPAIRED FUNCTIONAL MOBILITY AND ACTIVITY TOLERANCE: Primary | ICD-10-CM

## 2024-10-30 PROCEDURE — 97112 NEUROMUSCULAR REEDUCATION: CPT | Mod: KX,PN

## 2024-10-30 PROCEDURE — 97530 THERAPEUTIC ACTIVITIES: CPT | Mod: KX,PN

## 2024-10-30 PROCEDURE — 97110 THERAPEUTIC EXERCISES: CPT | Mod: KX,PN

## 2024-11-04 ENCOUNTER — CLINICAL SUPPORT (OUTPATIENT)
Dept: REHABILITATION | Facility: HOSPITAL | Age: 79
End: 2024-11-04
Payer: MEDICARE

## 2024-11-04 DIAGNOSIS — Z74.09 IMPAIRED FUNCTIONAL MOBILITY AND ACTIVITY TOLERANCE: Primary | ICD-10-CM

## 2024-11-04 PROCEDURE — 97112 NEUROMUSCULAR REEDUCATION: CPT | Mod: KX,PN,CQ

## 2024-11-04 PROCEDURE — 97110 THERAPEUTIC EXERCISES: CPT | Mod: KX,PN,CQ

## 2024-11-04 PROCEDURE — 97530 THERAPEUTIC ACTIVITIES: CPT | Mod: KX,PN,CQ

## 2024-11-04 NOTE — PROGRESS NOTES
[facility]   Physical Therapy Treatment Note      Name: oRd De La Paz  Clinic Number: [id number]    Therapy Diagnosis:   Encounter Diagnosis   Name Primary?    Impaired functional mobility and activity tolerance Yes     Physician: Luther Fischer MD    Visit Date: 11/4/2024    Physician Orders: PT Eval and Treat   Medical Diagnosis from Referral:   M70.61 (ICD-10-CM) - Trochanteric bursitis of right hip   Evaluation Date: 7/22/2024  Authorization Period Expiration: 12/31/2024  Plan of Care Expiration:  12/31/2024  Progress Note Due:   Date of Surgery: n/a   Visit # / Visits authorized: 18  FOTO: 3/ 3     Precautions: Standard         Time In:  8:45 AM  Time Out: 9:35 AM   Total Billable Time: 45 minutes     PTA Visit #: 1/5       Subjective     Patient reports: No new c/o's.  He was compliant with home exercise program.  Response to previous treatment:  good   Functional change:  walking more; playing golf; able to walk in yard without increased symptoms.     Pain: 0/10  Location: right hip      Objective      Objective Measures updated at progress report unless specified.     Treatment     Rod received the treatments listed below:      therapeutic exercises to develop strength, endurance, and ROM for 15 minutes including:  Trial of Elliptical: level 1 x 7 mins - LE's only - initially had trouble getting knees into better extension to support himself, required CGA initially, but then able to handle himself.   Recumbent bike : level 6 x 15 mins   Nu-Step : level 5 x 15 mins   Achilles stretch on wedge: 30 sec x 3  Single leg heel raises (R) x 15       manual therapy techniques: Soft tissue Mobilization were applied to the: right hip for 0 minutes, including:   IASTM to right lumbar spine, right posterior/superior hip;  PROM right hip through all planes with end range stretching.  S/L segmental lumbar flexion with blocking of segment above - L1 through L5/S1;       neuromuscular  "re-education activities to improve: Balance, Coordination, and Posture for 15 minutes. The following activities were included:  Instructed patient in resistive DF with use of blue  tband - DF/Eversion  Posterior pelvic tilt x 15  H/S stretch x 30 sec x 3 on each leg - pull foot into DF and push against strap into PF for eccentric work   Piriformis stretch (r) 30 sec x 2  Prone - lumbar extension x 6  Quadruped: cat/camel x 6   Toe-Taps while standing on Air-Ex mat - 8" step x 2 mins   SLS on Air-Ex on Right - 30 sec x 2  Tandem Stance on Air-Ex: 30 sec x 2   SLS (R) firm surface: move left leg into flex/abd/ext/rotation to challenge balance on right 30 sec x 3      therapeutic activities to improve functional performance for 15 minutes, including:  Step-ups: 8" step - lead with right - forward and lateral x 20 reps each  - on/off Air-Ex   3-way hip - green (med)  tband x 15 each   Squats x 15   Clams with green (med) band resistance x 15   S/L hip abduction  - green (med) band x 15  Bridges - green(med) band around thigh x 15  Supine - legs straight - blue band twisted around feet - alternate hip/knee flexion x 10   Monster walks - 20ft  - side-step back - repeat 2-3x - yellow (light) tband between ankles.       gait training to improve functional mobility and safety for   minutes, including:    Patient Education and Home Exercises       Education provided:   -reason for foot drop on right  - linked to neuropathy, lumbar problem; attention to walking, gave him exercises to address DF.    - watch body mechanics with lifting to protect back.     Written Home Exercises Provided: Patient instructed to cont prior HEP. Exercises were reviewed and Rod was able to demonstrate them prior to the end of the session.  Rod demonstrated good  understanding of the education provided. See Electronic Medical Record under Patient Instructions for exercises provided during therapy sessions    Assessment     Rod is progressing " well with improved mobility and daily function. Strength improving as well  Mild right hip discomfort noted with higher impact activities.  Still having some lumbar discomfort with prolonged activities;  Noted some right foot slapping with gait today - addressed with HEP for DF;   Will continue with lower back/hip strengthening and addressing of right foot slap with walking.  Complete current POC.     Rod Is progressing well towards his goals.   Patient prognosis is Good.     Patient will continue to benefit from skilled outpatient physical therapy to address the deficits listed in the problem list box on initial evaluation, provide pt/family education and to maximize pt's level of independence in the home and community environment.     Patient's spiritual, cultural and educational needs considered and pt agreeable to plan of care and goals.     Anticipated barriers to physical therapy: none     Goals:   Short Term Goals: 3 weeks   Demonstrate improvement in recent symptoms to progress toward long term goals  > Progressing   Correct standing postural deficits to reduce pain and promote postural awareness for injury prevention > progressing   Demonstrate compliance with initial exercise program > Progressing      Long Term Goals: 6 weeks   Able to perform all household activities with no limitation  > MET   Able to perform all work related activities with no limitation  > Progressing   Perform hip AROM through all planes without symptoms or limitation  > Progressing   Able to ambulate community required distances with no limitation.  > Progressing   Ascend/descend flight of steps with no limitation  > MET   Able to perform transfers from all surfaces with no limitation  > MET   Able to return to golf with no limitation  > MET   FOTO score improvement to > 74  Independent with HEP for continued improvement in function.     Plan     Plan of care Certification: 10/22/2024 > 12/31/2024     Outpatient Physical Therapy 2  times weekly for 4-6 weeks to include the following interventions: Manual Therapy, Neuromuscular Re-ed, Patient Education, Therapeutic Activities, and Therapeutic Exercise.        Jonathan Favre, PTA

## 2024-11-11 ENCOUNTER — CLINICAL SUPPORT (OUTPATIENT)
Dept: REHABILITATION | Facility: HOSPITAL | Age: 79
End: 2024-11-11
Payer: MEDICARE

## 2024-11-11 DIAGNOSIS — Z74.09 IMPAIRED FUNCTIONAL MOBILITY AND ACTIVITY TOLERANCE: Primary | ICD-10-CM

## 2024-11-11 PROCEDURE — 97112 NEUROMUSCULAR REEDUCATION: CPT | Mod: PN

## 2024-11-11 PROCEDURE — 97110 THERAPEUTIC EXERCISES: CPT | Mod: PN

## 2024-11-11 PROCEDURE — 97530 THERAPEUTIC ACTIVITIES: CPT | Mod: PN

## 2024-11-11 NOTE — PROGRESS NOTES
[facility]   Physical Therapy Treatment Note      Name: Rod De La Paz  Clinic Number: [id number]    Therapy Diagnosis:   Encounter Diagnosis   Name Primary?    Impaired functional mobility and activity tolerance Yes     Physician: Luther Fischer MD    Visit Date: 11/11/2024    Physician Orders: PT Eval and Treat   Medical Diagnosis from Referral:   M70.61 (ICD-10-CM) - Trochanteric bursitis of right hip   Evaluation Date: 7/22/2024  Authorization Period Expiration: 12/31/2024  Plan of Care Expiration:  12/31/2024  Progress Note Due:   Date of Surgery: n/a   Visit # / Visits authorized: 19  FOTO: 3/ 3     Precautions: Standard         Time In:  8:45 AM  Time Out: 9:35   AM   Total Billable Time: 45 minutes     PTA Visit #: 0/5       Subjective     Patient reports: No new c/o's. Played golf everyday, no longer having hip or lower back pain   He was compliant with home exercise program.  Response to previous treatment:  good   Functional change:  walking more; playing golf; able to walk in yard without increased symptoms.     Pain: 0/10  Location: right hip      Objective      Objective Measures updated at progress report unless specified.     Treatment     Rod received the treatments listed below:      therapeutic exercises to develop strength, endurance, and ROM for 15 minutes including:  Trial of Elliptical: level 1 x 7 mins - LE's only - initially had trouble getting knees into better extension to support himself, required CGA initially, but then able to handle himself.   Recumbent bike : level 6 x 15 mins   Nu-Step : level 5 x 15 mins   Achilles stretch on wedge: 30 sec x 3  Single leg heel raises (R) x 15       manual therapy techniques: Soft tissue Mobilization were applied to the: right hip for 0 minutes, including:   IASTM to right lumbar spine, right posterior/superior hip;  PROM right hip through all planes with end range stretching.  S/L segmental lumbar flexion with blocking of  "segment above - L1 through L5/S1;       neuromuscular re-education activities to improve: Balance, Coordination, and Posture for 15 minutes. The following activities were included:  Instructed patient in resistive DF with use of blue  tband - DF/Eversion  Posterior pelvic tilt x 15  H/S stretch x 30 sec x 3 on each leg - pull foot into DF and push against strap into PF for eccentric work   Piriformis stretch (r) 30 sec x 2  Prone - lumbar extension x 6  Quadruped: cat/camel x 6   Toe-Taps while standing on Air-Ex mat - 8" step x 2 mins   SLS on Air-Ex on Right - 30 sec x 2  Tandem Stance on Air-Ex: 30 sec x 2   SLS (R) firm surface: move left leg into flex/abd/ext/rotation to challenge balance on right 30 sec x 3      therapeutic activities to improve functional performance for 15 minutes, including:  Step-ups: 8" step - lead with right - forward and lateral x 20 reps each  - on/off Air-Ex   3-way hip - green (med)  tband x 15 each   Squats x 15   Clams with blue (heavy) band resistance x 15   S/L hip abduction  - blue (heavy) band x 15  Bridges - green(med) band around thigh x 15  Supine - legs straight - blue band twisted around feet - alternate hip/knee flexion x 10   Monster walks - 20ft  - side-step back - repeat 2-3x - yellow (light) tband between ankles.       gait training to improve functional mobility and safety for   minutes, including:    Patient Education and Home Exercises       Education provided:   -reason for foot drop on right  - linked to neuropathy, lumbar problem; attention to walking, gave him exercises to address DF.    - watch body mechanics with lifting to protect back.     Written Home Exercises Provided: Patient instructed to cont prior HEP. Exercises were reviewed and Rod was able to demonstrate them prior to the end of the session.  Rod demonstrated good  understanding of the education provided. See Electronic Medical Record under Patient Instructions for exercises provided during " therapy sessions    Assessment     Rod is progressing well with improved mobility and daily function. Strength improving as well  Mild right hip discomfort noted with higher impact activities.  Still having some lumbar discomfort with prolonged activities;  Noted some right foot slapping with gait today - addressed with HEP for DF;   Will continue with lower back/hip strengthening and addressing of right foot slap with walking.  Complete current POC.     Rod Is progressing well towards his goals.   Patient prognosis is Good.     Patient will continue to benefit from skilled outpatient physical therapy to address the deficits listed in the problem list box on initial evaluation, provide pt/family education and to maximize pt's level of independence in the home and community environment.     Patient's spiritual, cultural and educational needs considered and pt agreeable to plan of care and goals.     Anticipated barriers to physical therapy: none     Goals:   Short Term Goals: 3 weeks   Demonstrate improvement in recent symptoms to progress toward long term goals  > Progressing   Correct standing postural deficits to reduce pain and promote postural awareness for injury prevention > progressing   Demonstrate compliance with initial exercise program > Progressing      Long Term Goals: 6 weeks   Able to perform all household activities with no limitation  > MET   Able to perform all work related activities with no limitation  > Progressing   Perform hip AROM through all planes without symptoms or limitation  > Progressing   Able to ambulate community required distances with no limitation.  > Progressing   Ascend/descend flight of steps with no limitation  > MET   Able to perform transfers from all surfaces with no limitation  > MET   Able to return to golf with no limitation  > MET   FOTO score improvement to > 74  Independent with HEP for continued improvement in function.     Plan     Plan of care Certification:  10/22/2024 > 12/31/2024     Outpatient Physical Therapy 2 times weekly for 4-6 weeks to include the following interventions: Manual Therapy, Neuromuscular Re-ed, Patient Education, Therapeutic Activities, and Therapeutic Exercise.        Rose Ballesteros, PT

## 2024-11-13 ENCOUNTER — CLINICAL SUPPORT (OUTPATIENT)
Dept: REHABILITATION | Facility: HOSPITAL | Age: 79
End: 2024-11-13
Payer: MEDICARE

## 2024-11-13 DIAGNOSIS — Z74.09 IMPAIRED FUNCTIONAL MOBILITY AND ACTIVITY TOLERANCE: Primary | ICD-10-CM

## 2024-11-13 PROCEDURE — 97112 NEUROMUSCULAR REEDUCATION: CPT | Mod: KX,PN

## 2024-11-13 PROCEDURE — 97530 THERAPEUTIC ACTIVITIES: CPT | Mod: KX,PN

## 2024-11-13 PROCEDURE — 97110 THERAPEUTIC EXERCISES: CPT | Mod: KX,PN

## 2024-11-13 NOTE — PROGRESS NOTES
[facility]   Physical Therapy Treatment Note / Discharge Note      Name: Rod De La Paz  Clinic Number: [id number]    Therapy Diagnosis:   Encounter Diagnosis   Name Primary?    Impaired functional mobility and activity tolerance Yes     Physician: Luther Fischer MD    Visit Date: 11/13/2024    Physician Orders: PT Eval and Treat   Medical Diagnosis from Referral:   M70.61 (ICD-10-CM) - Trochanteric bursitis of right hip   Evaluation Date: 7/22/2024  Authorization Period Expiration: 12/31/2024  Plan of Care Expiration:  12/31/2024  Progress Note Due:   Date of Surgery: n/a   Visit # / Visits authorized: 20   FOTO: 3/ 3     Precautions: Standard         Time In:  8:45 AM  Time Out:  9: 35  AM   Total Billable Time: 45 minutes     PTA Visit #: 0/5       Subjective     Patient reports: No new c/o's. Last visit today; doing well; playing golf, will continue to exercise at [employer] independently.  Going for PET scan today - 6 month f/u for prostate CA.   He was compliant with home exercise program.  Response to previous treatment:  good   Functional change:  walking more; playing golf; able to walk in yard without increased symptoms.     Pain: 0/10  Location: right hip      Objective      Objective Measures updated at progress report unless specified.     Discussed walking program for home - needs to start walking the dog again - recommended starting off with 1/2 mile > 1 mile several times per week, walking on solid surface as well as grass for balance challenges.     Demonstrating functional Lumbar and right hip AROM now; no increase in pain with any movements or functional activities.    Strength:   Lower Extremity Strength    Right LE Left LE   Knee extension: 5/5 5/5   Knee flexion: 5/5 5/5   Hip flexion: 5/5 5/5   Hip Internal Rotation:  4-/5    4/5      Hip External Rotation: 3+/5 > 4+/5     4/5      Hip extension:  4-/5  4-/5   Hip abduction: 3+/5 > 4-/5  4-/5   Hip adduction: 3+/5 >  "4-/5  3+/5 > 4-/5    Ankle dorsiflexion: 5/5 5/5   Ankle plantarflexion: 5/5 5/5     SLS (R)  Firm 10 sec,  Foam 10 sec with no UE assist - able to maintain for 30 sec with light UE assist   Toe Tap test: 8 reps on 8" step with unaffecting leg in 15 seconds - performed while standing on Air-Ex mat as well - able to maintain balance,   30 sec chair rise: 9 reps with arms crossed  Steps: 1 foot up/down steps without railing; With railing can perform 1 foot per step, but with some discomfort and slower pace.         Treatment     Rod received the treatments listed below:      therapeutic exercises to develop strength, endurance, and ROM for 15 minutes including:  Trial of Elliptical: level 1 x 7 mins - LE's only - initially had trouble getting knees into better extension to support himself, required CGA initially, but then able to handle himself.   Recumbent bike : level 6 x 15 mins   Nu-Step : level 5 x 15 mins   Achilles stretch on wedge: 30 sec x 3  Single leg heel raises (R) x 15       manual therapy techniques: Soft tissue Mobilization were applied to the: right hip for 0 minutes, including:   IASTM to right lumbar spine, right posterior/superior hip;  PROM right hip through all planes with end range stretching.  S/L segmental lumbar flexion with blocking of segment above - L1 through L5/S1;       neuromuscular re-education activities to improve: Balance, Coordination, and Posture for 15 minutes. The following activities were included:  Instructed patient in resistive DF with use of blue  tband - DF/Eversion  Posterior pelvic tilt x 15  H/S stretch x 30 sec x 3 on each leg - pull foot into DF and push against strap into PF for eccentric work   Piriformis stretch (r) 30 sec x 2  Prone - lumbar extension x 6  Quadruped: cat/camel x 6   Toe-Taps while standing on Air-Ex mat - 8" step x 2 mins   SLS on Air-Ex on Right - 30 sec x 2  Tandem Stance on Air-Ex: 30 sec x 2   SLS (R) firm surface: move left leg into " "flex/abd/ext/rotation to challenge balance on right 30 sec x 3      therapeutic activities to improve functional performance for 15 minutes, including:  Step-ups: 8" step - lead with right - forward and lateral x 20 reps each  - on/off Air-Ex   3-way hip - green (med)  tband x 15 each   Squats x 15   Clams with blue (heavy) band resistance x 15   S/L hip abduction  - blue (heavy) band x 15  Bridges - green(med) band around thigh x 15  Supine - legs straight - blue band twisted around feet - alternate hip/knee flexion x 10   Monster walks - 20ft  - side-step back - repeat 2-3x - yellow (light) tband between ankles.       gait training to improve functional mobility and safety for   minutes, including:    Patient Education and Home Exercises       Education provided:   -reason for foot drop on right  - linked to neuropathy, lumbar problem; attention to walking, gave him exercises to address DF.    - watch body mechanics with lifting to protect back.     Written Home Exercises Provided: Patient instructed to cont prior HEP. Exercises were reviewed and Rod was able to demonstrate them prior to the end of the session.  Rod demonstrated good  understanding of the education provided. See Electronic Medical Record under Patient Instructions for exercises provided during therapy sessions    Assessment     Rod is progressing well with improved mobility and daily function. Strength improving as well  Mild right hip discomfort noted with higher impact activities.  Still having some lumbar discomfort with prolonged activities;  Noted some right foot slapping with gait today - addressed with HEP for DF;   Will continue with lower back/hip strengthening and addressing of right foot slap with walking.  Complete current POC.     Rod Is progressing well towards his goals.   Patient prognosis is Good.     Patient will continue to benefit from skilled outpatient physical therapy to address the deficits listed in the problem " list box on initial evaluation, provide pt/family education and to maximize pt's level of independence in the home and community environment.     Patient's spiritual, cultural and educational needs considered and pt agreeable to plan of care and goals.     Anticipated barriers to physical therapy: none     Goals:   Short Term Goals: 3 weeks   Demonstrate improvement in recent symptoms to progress toward long term goals  > MET   Correct standing postural deficits to reduce pain and promote postural awareness for injury prevention > MET   Demonstrate compliance with initial exercise program > MET      Long Term Goals: 6 weeks   Able to perform all household activities with no limitation  > MET   Able to perform all work related activities with no limitation  > MET   Perform hip AROM through all planes without symptoms or limitation  > MET   Able to ambulate community required distances with no limitation.  > MET   Ascend/descend flight of steps with no limitation  > MET   Able to perform transfers from all surfaces with no limitation  > MET   Able to return to golf with no limitation  > MET   FOTO score improvement to > 74 > MET   Independent with HEP for continued improvement in function.     Plan     Discharge from physical therapy at this time.  Patient has met goals, Independent function.        Rose Ballesteros, PT